# Patient Record
Sex: MALE | Race: WHITE | Employment: OTHER | URBAN - METROPOLITAN AREA
[De-identification: names, ages, dates, MRNs, and addresses within clinical notes are randomized per-mention and may not be internally consistent; named-entity substitution may affect disease eponyms.]

---

## 2017-07-21 ENCOUNTER — HOSPITAL ENCOUNTER (EMERGENCY)
Facility: CLINIC | Age: 82
Discharge: HOME | End: 2017-07-21
Attending: SPECIALIST
Payer: MEDICARE

## 2017-07-21 VITALS
SYSTOLIC BLOOD PRESSURE: 136 MMHG | RESPIRATION RATE: 14 BRPM | BODY MASS INDEX: 24.62 KG/M2 | TEMPERATURE: 97.6 F | OXYGEN SATURATION: 95 % | WEIGHT: 172 LBS | DIASTOLIC BLOOD PRESSURE: 72 MMHG | HEART RATE: 90 BPM | HEIGHT: 70 IN

## 2017-07-21 DIAGNOSIS — M54.50 ACUTE EXACERBATION OF CHRONIC LOW BACK PAIN: Primary | ICD-10-CM

## 2017-07-21 DIAGNOSIS — G89.29 ACUTE EXACERBATION OF CHRONIC LOW BACK PAIN: Primary | ICD-10-CM

## 2017-07-21 PROCEDURE — 99283 EMERGENCY DEPT VISIT LOW MDM: CPT

## 2017-07-21 RX ORDER — DIAZEPAM 5 MG/1
5 TABLET ORAL NIGHTLY PRN
Qty: 10 TABLET | Refills: 0 | Status: SHIPPED | OUTPATIENT
Start: 2017-07-21 | End: 2017-07-31

## 2017-07-21 RX ORDER — TRAMADOL HYDROCHLORIDE 50 MG/1
50 TABLET ORAL EVERY 6 HOURS PRN
Qty: 20 TABLET | Refills: 0 | Status: SHIPPED | OUTPATIENT
Start: 2017-07-21 | End: 2017-07-28

## 2017-07-21 ASSESSMENT — PAIN DESCRIPTION - ONSET: ONSET: GRADUAL

## 2017-07-21 ASSESSMENT — PAIN SCALES - GENERAL
PAINLEVEL_OUTOF10: 6
PAINLEVEL_OUTOF10: 6

## 2017-07-21 ASSESSMENT — PAIN - FUNCTIONAL ASSESSMENT: PAIN_FUNCTIONAL_ASSESSMENT: 0-10

## 2017-07-21 ASSESSMENT — PAIN DESCRIPTION - LOCATION
LOCATION: BACK
LOCATION: BACK

## 2017-07-21 ASSESSMENT — ENCOUNTER SYMPTOMS: BACK PAIN: 1

## 2017-07-21 ASSESSMENT — PAIN DESCRIPTION - PROGRESSION: CLINICAL_PROGRESSION: GRADUALLY WORSENING

## 2017-07-21 ASSESSMENT — PAIN DESCRIPTION - ORIENTATION
ORIENTATION: LOWER
ORIENTATION: LOWER

## 2017-07-21 ASSESSMENT — PAIN DESCRIPTION - FREQUENCY: FREQUENCY: CONTINUOUS

## 2017-07-21 ASSESSMENT — PAIN DESCRIPTION - PAIN TYPE: TYPE: CHRONIC PAIN

## 2017-07-24 PROBLEM — R60.9 EDEMA: Status: ACTIVE | Noted: 2017-07-24

## 2018-08-21 PROBLEM — M54.50 LOW BACK PAIN: Status: ACTIVE | Noted: 2018-08-21

## 2018-08-25 ENCOUNTER — HOSPITAL ENCOUNTER (EMERGENCY)
Facility: CLINIC | Age: 83
Discharge: HOME | End: 2018-08-25
Attending: EMERGENCY MEDICINE
Payer: MEDICARE

## 2018-08-25 ENCOUNTER — APPOINTMENT (OUTPATIENT)
Dept: GENERAL RADIOLOGY | Facility: CLINIC | Age: 83
End: 2018-08-25
Payer: MEDICARE

## 2018-08-25 VITALS
TEMPERATURE: 97 F | SYSTOLIC BLOOD PRESSURE: 148 MMHG | OXYGEN SATURATION: 97 % | DIASTOLIC BLOOD PRESSURE: 60 MMHG | HEART RATE: 62 BPM | BODY MASS INDEX: 24.25 KG/M2 | HEIGHT: 68 IN | RESPIRATION RATE: 22 BRPM | WEIGHT: 160 LBS

## 2018-08-25 DIAGNOSIS — J02.9 ACUTE PHARYNGITIS, UNSPECIFIED ETIOLOGY: ICD-10-CM

## 2018-08-25 DIAGNOSIS — J40 BRONCHITIS: Primary | ICD-10-CM

## 2018-08-25 PROCEDURE — 71046 X-RAY EXAM CHEST 2 VIEWS: CPT

## 2018-08-25 PROCEDURE — 99283 EMERGENCY DEPT VISIT LOW MDM: CPT

## 2018-08-25 RX ORDER — AZITHROMYCIN 250 MG/1
250 TABLET, FILM COATED ORAL DAILY
Qty: 4 TABLET | Refills: 0 | Status: SHIPPED | OUTPATIENT
Start: 2018-08-25 | End: 2018-08-28 | Stop reason: ALTCHOICE

## 2018-08-25 ASSESSMENT — PAIN DESCRIPTION - LOCATION: LOCATION: THROAT

## 2018-08-25 ASSESSMENT — PAIN DESCRIPTION - FREQUENCY: FREQUENCY: CONTINUOUS

## 2018-08-25 ASSESSMENT — PAIN SCALES - GENERAL: PAINLEVEL_OUTOF10: 3

## 2018-08-25 NOTE — ED PROVIDER NOTES
[facility] ED  [address] [address]  Phone: [phone]  eMERGENCY dEPARTMENT eNCOUnter      Pt Name: Ella Singh  MRN: [id number]  [place] 4/17/1929  Date of evaluation: 8/25/2018    CHIEF COMPLAINT       Chief Complaint   Patient presents with    Cough     5 days, runny nose, dry cough, denies fevers, states raspy voice        HISTORY OF PRESENT ILLNESS    Ella Singh is a 80 y.o. male who presents To the emergency department with a five-day history of sore throat and rhinorrhea with a cough. Denies chest pain or shortness of breath. He's visiting from [place] has been in town since July. He has history of chronic back pain and [place] [facility] was given steroids and just finished his last dose of prednisone today. He denies any other symptoms. REVIEW OF SYSTEMS       Constitutional: No fevers or chills   HEENT: Positive sore throat rhinorrhea or earache  Eyes: No blurry vision or double vision no drainage   Cardiovascular: No chest pain or tachycardia   Respiratory: No wheezing or shortness of breath positive cough  Gastrointestinal: No nausea, vomiting, diarrhea, constipation, or abdominal pain   : No hematuria or dysuria   Musculoskeletal: No swelling or pain positive chronic low back pain  Skin: No rash   Neurological: No focal neurologic complaints, paresthesias, weakness, or headache     PAST MEDICAL HISTORY    has a past medical history of Chronic back pain. SURGICAL HISTORY      has a past surgical history that includes eye surgery; hernia repair (2006); and shoulder surgery (07/20/2011).     CURRENT MEDICATIONS       Discharge Medication List as of 8/25/2018 11:08 AM      CONTINUE these medications which have NOT CHANGED    Details   metoprolol tartrate (LOPRESSOR) 25 MG tablet TAKE ONE TABLET BY MOUTH ONE TIME DAILY, R-2Historical Med      terazosin (HYTRIN) 10 MG capsule TAKE 1 ORAL CAPSULE ONCE A DAY, R-1Historical Med

## 2020-08-26 PROBLEM — J45.909 ASTHMATIC BRONCHITIS WITHOUT COMPLICATION: Status: ACTIVE | Noted: 2020-08-26

## 2020-08-28 ENCOUNTER — HOSPITAL ENCOUNTER (OUTPATIENT)
Facility: CLINIC | Age: 85
Discharge: HOME | End: 2020-08-28
Payer: MEDICARE

## 2020-08-28 LAB
ABSOLUTE EOS #: 0.08 K/UL (ref 0–0.44)
ABSOLUTE IMMATURE GRANULOCYTE: <0.03 K/UL (ref 0–0.3)
ABSOLUTE LYMPH #: 1.18 K/UL (ref 1.1–3.7)
ABSOLUTE MONO #: 0.37 K/UL (ref 0.1–1.2)
ALT SERPL-CCNC: 13 U/L (ref 5–41)
ANION GAP SERPL CALCULATED.3IONS-SCNC: 13 MMOL/L (ref 9–17)
AST SERPL-CCNC: 17 U/L
BASOPHILS # BLD: 1 % (ref 0–2)
BASOPHILS ABSOLUTE: 0.03 K/UL (ref 0–0.2)
BUN BLDV-MCNC: 18 MG/DL (ref 8–23)
BUN/CREAT BLD: NORMAL (ref 9–20)
CALCIUM SERPL-MCNC: 9.2 MG/DL (ref 8.6–10.4)
CHLORIDE BLD-SCNC: 104 MMOL/L (ref 98–107)
CHOLESTEROL/HDL RATIO: 2.8
CHOLESTEROL: 186 MG/DL
CO2: 22 MMOL/L (ref 20–31)
CREAT SERPL-MCNC: 0.73 MG/DL (ref 0.7–1.2)
DIFFERENTIAL TYPE: ABNORMAL
EOSINOPHILS RELATIVE PERCENT: 2 % (ref 1–4)
ESTIMATED AVERAGE GLUCOSE: 126 MG/DL
GFR AFRICAN AMERICAN: >60 ML/MIN
GFR NON-AFRICAN AMERICAN: >60 ML/MIN
GFR SERPL CREATININE-BSD FRML MDRD: NORMAL ML/MIN/{1.73_M2}
GFR SERPL CREATININE-BSD FRML MDRD: NORMAL ML/MIN/{1.73_M2}
GLUCOSE FASTING: 97 MG/DL (ref 70–99)
HBA1C MFR BLD: 6 % (ref 4–6)
HCT VFR BLD CALC: 42.5 % (ref 40.7–50.3)
HDLC SERPL-MCNC: 67 MG/DL
HEMOGLOBIN: 13.9 G/DL (ref 13–17)
IMMATURE GRANULOCYTES: 0 %
LDL CHOLESTEROL: 107 MG/DL (ref 0–130)
LYMPHOCYTES # BLD: 24 % (ref 24–43)
MCH RBC QN AUTO: 32.1 PG (ref 25.2–33.5)
MCHC RBC AUTO-ENTMCNC: 32.7 G/DL (ref 28.4–34.8)
MCV RBC AUTO: 98.2 FL (ref 82.6–102.9)
MONOCYTES # BLD: 7 % (ref 3–12)
NRBC AUTOMATED: 0 PER 100 WBC
PDW BLD-RTO: 12.6 % (ref 11.8–14.4)
PLATELET # BLD: 193 K/UL (ref 138–453)
PLATELET ESTIMATE: ABNORMAL
PMV BLD AUTO: 12.2 FL (ref 8.1–13.5)
POTASSIUM SERPL-SCNC: 4.5 MMOL/L (ref 3.7–5.3)
RBC # BLD: 4.33 M/UL (ref 4.21–5.77)
RBC # BLD: ABNORMAL 10*6/UL
SEG NEUTROPHILS: 66 % (ref 36–65)
SEGMENTED NEUTROPHILS ABSOLUTE COUNT: 3.36 K/UL (ref 1.5–8.1)
SODIUM BLD-SCNC: 139 MMOL/L (ref 135–144)
TRIGL SERPL-MCNC: 59 MG/DL
TSH SERPL DL<=0.05 MIU/L-ACNC: 1.49 MIU/L (ref 0.3–5)
VITAMIN B-12: 834 PG/ML (ref 232–1245)
VITAMIN D 25-HYDROXY: 73.8 NG/ML (ref 30–100)
VLDLC SERPL CALC-MCNC: NORMAL MG/DL (ref 1–30)
WBC # BLD: 5 K/UL (ref 3.5–11.3)
WBC # BLD: ABNORMAL 10*3/UL

## 2020-08-28 PROCEDURE — 82306 VITAMIN D 25 HYDROXY: CPT

## 2020-08-28 PROCEDURE — 83036 HEMOGLOBIN GLYCOSYLATED A1C: CPT

## 2020-08-28 PROCEDURE — 36415 COLL VENOUS BLD VENIPUNCTURE: CPT

## 2020-08-28 PROCEDURE — 80048 BASIC METABOLIC PNL TOTAL CA: CPT

## 2020-08-28 PROCEDURE — 84450 TRANSFERASE (AST) (SGOT): CPT

## 2020-08-28 PROCEDURE — 85025 COMPLETE CBC W/AUTO DIFF WBC: CPT

## 2020-08-28 PROCEDURE — 84460 ALANINE AMINO (ALT) (SGPT): CPT

## 2020-08-28 PROCEDURE — 82607 VITAMIN B-12: CPT

## 2020-08-28 PROCEDURE — 84443 ASSAY THYROID STIM HORMONE: CPT

## 2020-08-28 PROCEDURE — 80061 LIPID PANEL: CPT

## 2020-09-18 ENCOUNTER — OFFICE VISIT (OUTPATIENT)
Dept: PAIN MANAGEMENT | Age: 85
End: 2020-09-18
Payer: MEDICARE

## 2020-09-18 VITALS
HEART RATE: 60 BPM | HEIGHT: 70 IN | SYSTOLIC BLOOD PRESSURE: 120 MMHG | TEMPERATURE: 97.2 F | WEIGHT: 165 LBS | BODY MASS INDEX: 23.62 KG/M2 | OXYGEN SATURATION: 90 % | DIASTOLIC BLOOD PRESSURE: 65 MMHG

## 2020-09-18 PROCEDURE — 1036F TOBACCO NON-USER: CPT | Performed by: ANESTHESIOLOGY

## 2020-09-18 PROCEDURE — 99204 OFFICE O/P NEW MOD 45 MIN: CPT | Performed by: ANESTHESIOLOGY

## 2020-09-18 PROCEDURE — G8427 DOCREV CUR MEDS BY ELIG CLIN: HCPCS | Performed by: ANESTHESIOLOGY

## 2020-09-18 PROCEDURE — 1123F ACP DISCUSS/DSCN MKR DOCD: CPT | Performed by: ANESTHESIOLOGY

## 2020-09-18 PROCEDURE — G8420 CALC BMI NORM PARAMETERS: HCPCS | Performed by: ANESTHESIOLOGY

## 2020-09-18 PROCEDURE — 4040F PNEUMOC VAC/ADMIN/RCVD: CPT | Performed by: ANESTHESIOLOGY

## 2020-09-18 RX ORDER — HYDROCODONE BITARTRATE AND ACETAMINOPHEN 5; 325 MG/1; MG/1
1 TABLET ORAL 2 TIMES DAILY
Qty: 60 TABLET | Refills: 0 | Status: SHIPPED | OUTPATIENT
Start: 2020-09-18 | End: 2020-11-17 | Stop reason: SDUPTHER

## 2020-09-18 RX ORDER — HYDROCODONE BITARTRATE AND ACETAMINOPHEN 5; 325 MG/1; MG/1
1 TABLET ORAL EVERY 6 HOURS PRN
COMMUNITY
End: 2021-01-05 | Stop reason: SDUPTHER

## 2020-09-18 ASSESSMENT — ENCOUNTER SYMPTOMS
ALLERGIC/IMMUNOLOGIC NEGATIVE: 1
EYES NEGATIVE: 1
RESPIRATORY NEGATIVE: 1
BACK PAIN: 1
GASTROINTESTINAL NEGATIVE: 1

## 2020-09-18 NOTE — PROGRESS NOTES
tingling / weakness):  yes  -Where at: legs   -Down into finger tips or toes (specify which finger or toes): no   -constant or intermitting: constant   11. Red Flags: (weight loss / chills / loss of bladder or bowel control): no     Previous management history  1. Previous diagnostic workup: (Imaging/EMG)   CT, MRI, or Xray: yes   What part of the body:back   What facility did they have it at: in [place]   What year or specific date: 2018   EMG:  Yes 2018    2. Previous non interventional treatments tried:  chiropractor or physical therapy: PT  What part of the body: Back   What facility was it done at: in [place]   How long ago was it last tried:year  Did it work: No  Did they complete it:Yes    3. Previous Medications tried  NSAID's: yes  Neurontin: yes  Lyrica: no  Trycyclic antidepressant (Ellavil / Pamelor ): no  Cymbalta: no  Opioids (Ultram / Vicodin / Percocet / Morphine / Dilaudid / Oramorph/ Fentanyl etc.): norco, tramadol, morphine   Last Pain medication taken (name of med and date): norco 9/17/2020    4. Previous Interventional pain procedures tried:  What kind of injection: epidural  Who did the injection: Dr. Juno Lopez in [place]   did the injection help: no  Last time injection was done:2-3 months    5.  Previous surgeries for pain  What part of the body did they have the surgery: mild procedure   What physician did the surgery: Dr. Juno Lopez in [facility] did they have the surgery done: [place]   Date of Surgery:2018    Social History:  Marital status:    Employment History:retired   Working  No  Full time Or Part time: n/a  Disability  No   Legal Issues related to pain complaint: No     Pain Disability Index score : 64    Lab Results   Component Value Date    LABA1C 6.0 08/28/2020     Lab Results   Component Value Date     08/28/2020         Informant: patient        Past Medical History:   Diagnosis Date    Chronic back pain       Past Surgical History: Procedure Laterality Date    EYE SURGERY      HERNIA REPAIR  2006    SHOULDER SURGERY  07/20/2011     Social History     Socioeconomic History    Marital status:      Spouse name: None    Number of children: None    Years of education: None    Highest education level: None   Occupational History    None   Social Needs    Financial resource strain: None    Food insecurity     Worry: None     Inability: None    Transportation needs     Medical: None     Non-medical: None   Tobacco Use    Smoking status: Former Smoker    Smokeless tobacco: Never Used   Substance and Sexual Activity    Alcohol use: Yes     Alcohol/week: 1.0 standard drinks     Types: 1 Glasses of wine per week    Drug use: No    Sexual activity: None   Lifestyle    Physical activity     Days per week: None     Minutes per session: None    Stress: None   Relationships    Social connections     Talks on phone: None     Gets together: None     Attends Orthodox service: None     Active member of club or organization: None     Attends meetings of clubs or organizations: None     Relationship status: None    Intimate partner violence     Fear of current or ex partner: None     Emotionally abused: None     Physically abused: None     Forced sexual activity: None   Other Topics Concern    None   Social History Narrative    None     Family History   Problem Relation Age of Onset    Heart Disease Mother     Diabetes Mother     Stroke Father      No Known Allergies  Patient has no known allergies. Vitals:    09/18/20 0903   BP: 120/65   Pulse: 60   Temp: 97.2 °F (36.2 °C)   SpO2: 90%     Current Outpatient Medications   Medication Sig Dispense Refill    HYDROcodone-acetaminophen (NORCO) 5-325 MG per tablet Take 1 tablet by mouth every 6 hours as needed for Pain.  HYDROcodone-acetaminophen (NORCO) 5-325 MG per tablet Take 1 tablet by mouth 2 times daily for 30 days.  60 tablet 0    gabapentin (NEURONTIN) 600 MG tablet Take 600 mg by mouth 3 times daily.  metoprolol tartrate (LOPRESSOR) 25 MG tablet TAKE ONE TABLET BY MOUTH ONE TIME DAILY  2    terazosin (HYTRIN) 10 MG capsule TAKE 1 ORAL CAPSULE ONCE A DAY  1     No current facility-administered medications for this visit. Review of Systems   Constitutional: Negative. Negative for fever. HENT: Negative. Eyes: Negative. Respiratory: Negative. Cardiovascular: Negative. Gastrointestinal: Negative. Endocrine: Negative. Genitourinary: Negative. Musculoskeletal: Positive for back pain and neck pain. Skin: Negative. Allergic/Immunologic: Negative. Neurological: Positive for weakness. Hematological: Bruises/bleeds easily. Psychiatric/Behavioral: Negative. All other systems reviewed and are negative. Objective:  General Appearance:  Well-appearing, in no acute distress, uncomfortable and in pain. Vital signs: (most recent): Blood pressure 120/65, pulse 60, temperature 97.2 °F (36.2 °C), height 5' 9.5\" (1.765 m), weight 165 lb (74.8 kg), SpO2 90 %. Vital signs are normal.  No fever. Output: Producing urine and producing stool. HEENT: Normal HEENT exam.    Lungs:  Normal effort and normal respiratory rate. Breath sounds clear to auscultation. He is not in respiratory distress. No decreased breath sounds. Heart: Normal rate. Regular rhythm. No murmur. Chest: Symmetric chest wall expansion. No chest wall tenderness. Extremities: Normal range of motion. There is no deformity. Neurological: Patient is alert and oriented to person, place and time. Normal strength. Patient has normal reflexes, normal muscle tone and normal coordination. Pupils:  Pupils are equal, round, and reactive to light. Pupils are equal.   Skin:  Warm and dry. No rash or cyanosis.       Patient ambulate with walker  Spine range of motion is limited  Positive tenderness palpation over bilateral lumbar paraspinal muscle  Gait antalgic  Standing in a forward stooped posture      Assessment & Plan   Pain History\  This is a pleasant 55-year-old gentleman with no significant past medical history  He recently moved from [place] to [place] with his family  Patient is seen for history of chronic lower back pain  Onset of symptom many years ago  Symptoms are progressively worsened  He describes his back pain is a constant aching nagging tenderness and stiffness  Pain also radiates down in both legs with standing and walking and alleviates with rest  Leg pain is only with walking  Patient has been diagnosed with lumbar spondylosis and lumbar spinal stenosis  Patient of tried different modalities including physical therapy, different pain injections, pain medications, spinal cord stimulator trial and mild procedure  Patient continued to have pain after these intervention  None of these interventions have made any significant improvement  His last diagnostic imaging was more than 2 years ago  His most recent procedure was an epidural injection with 1 day relief  Mild procedure was done last year in 2019  Spinal cord stimulator trial was in 2018    Pain is interfering with quality of life  He denies any significant changes in bladder or bowel control  Patient for last 3 to 4 years have been on Norco PRN basis 1 to 2 tablets a day  Denies any side effect from the medication and finds medication helpful allowing him to do daily life activity    Pain score today  8  1. Chronic bilateral low back pain with bilateral sciatica    2. Spinal stenosis of lumbar region with neurogenic claudication    3.  Lumbosacral spondylosis without myelopathy          Safe use of medication discussed  Automated prescription report reviewed  We will collect urine for toxicology  We will sign opioid contract  Expectation with regard to safe use of opioid discussed with patient    Clinical presentation suggest to major etiology  Chronic axial back pain likely related to lumbar facet arthropathy and neurogenic claudication likely resulting from neurogenic spinal stenosis    I will obtain an MRI lumbar spine and will consider for appropriate interventional procedure or surgical referral if indicated after review of the imaging  Plan discussed with patient and accompanying daughter    Orders Placed This Encounter   Procedures    MRI LUMBAR SPINE WO CONTRAST      Orders Placed This Encounter   Medications    HYDROcodone-acetaminophen (NORCO) 5-325 MG per tablet     Sig: Take 1 tablet by mouth 2 times daily for 30 days. Dispense:  60 tablet     Refill:  0     Reduce doses taken as pain becomes manageable      Controlled Substance Monitoring:    Acute and Chronic Pain Monitoring:   RX Monitoring 9/18/2020   Periodic Controlled Substance Monitoring No signs of potential drug abuse or diversion identified. ;Possible medication side effects, risk of tolerance/dependence & alternative treatments discussed. ;Assessed functional status. ;Random urine drug screen sent today. Chronic Pain > 50 MEDD Obtained or confirmed \"Consent for Opioid Use\" on file.            Electronically signed by Benita Resendiz MD on 9/18/2020 at 9:42 AM

## 2020-09-24 ENCOUNTER — HOSPITAL ENCOUNTER (OUTPATIENT)
Dept: MRI IMAGING | Facility: CLINIC | Age: 85
Discharge: HOME | End: 2020-09-26
Payer: MEDICARE

## 2020-09-24 PROCEDURE — 72148 MRI LUMBAR SPINE W/O DYE: CPT

## 2020-09-29 ENCOUNTER — TELEPHONE (OUTPATIENT)
Dept: PAIN MANAGEMENT | Age: 85
End: 2020-09-29

## 2020-09-29 NOTE — TELEPHONE ENCOUNTER
UDS positive for alcohol. Pt notified that Dr. mAy Dc will be unable to continue to prescribed pain medications if he continues consuming alcohol. Pt instructed to keep follow up appointment as scheduled.

## 2020-10-12 NOTE — PROGRESS NOTES
The patient is a 80 y. o. Non-/non  male. Chief Complaint   Patient presents with    Follow-up          HPI     Patient was seen 4 weeks ago for history of severe chronic lower back pain  Pain is constant  Reports severe stiffness in back when he wakes up in the morning  Also have pain going down both legs are aggravated with standing and walking    He has recently moved from [place] and was followed with the pain clinic  Had numerous different interventions here including facet injection epidural injections spinal cord stimulator trial and mild procedure    Have a recent MRI lumbar spine and is here for review of the imaging and discuss treatment options          Past Medical History:   Diagnosis Date    Chronic back pain       Past Surgical History:   Procedure Laterality Date    EYE SURGERY      HERNIA REPAIR  2006    SHOULDER SURGERY  07/20/2011     Social History     Socioeconomic History    Marital status:      Spouse name: None    Number of children: None    Years of education: None    Highest education level: None   Occupational History    None   Social Needs    Financial resource strain: None    Food insecurity     Worry: None     Inability: None    Transportation needs     Medical: None     Non-medical: None   Tobacco Use    Smoking status: Former Smoker    Smokeless tobacco: Never Used   Substance and Sexual Activity    Alcohol use:  Yes     Alcohol/week: 1.0 standard drinks     Types: 1 Glasses of wine per week    Drug use: No    Sexual activity: None   Lifestyle    Physical activity     Days per week: None     Minutes per session: None    Stress: None   Relationships    Social connections     Talks on phone: None     Gets together: None     Attends [employer]ist service: None     Active member of club or organization: None     Attends meetings of clubs or organizations: None     Relationship status: None    Intimate partner violence     Fear of current or ex partner: None     Emotionally abused: None     Physically abused: None     Forced sexual activity: None   Other Topics Concern    None   Social History Narrative    None     Family History   Problem Relation Age of Onset    Heart Disease Mother     Diabetes Mother     Stroke Father      No Known Allergies  Patient has no known allergies. Vitals:    10/13/20 0927   BP: 101/60   Pulse: 64     Current Outpatient Medications   Medication Sig Dispense Refill    HYDROcodone-acetaminophen (NORCO) 5-325 MG per tablet Take 1 tablet by mouth every 6 hours as needed for Pain.  HYDROcodone-acetaminophen (NORCO) 5-325 MG per tablet Take 1 tablet by mouth 2 times daily for 30 days. 60 tablet 0    gabapentin (NEURONTIN) 600 MG tablet Take 600 mg by mouth 3 times daily.  metoprolol tartrate (LOPRESSOR) 25 MG tablet TAKE ONE TABLET BY MOUTH ONE TIME DAILY  2    cephALEXin (KEFLEX) 500 MG capsule Take 1 capsule by mouth 4 times daily (Patient not taking: Reported on 10/13/2020) 28 capsule 0    terazosin (HYTRIN) 10 MG capsule TAKE 1 ORAL CAPSULE ONCE A DAY  1     No current facility-administered medications for this visit. Review of Systems   Constitutional: Negative. Negative for fever. Musculoskeletal: Positive for back pain. Hematological: Negative. Psychiatric/Behavioral: Negative. Objective:  General Appearance:  Well-appearing, in no acute distress, uncomfortable and in pain. Vital signs: (most recent): Blood pressure 101/60, pulse 64, height 5' 9\" (1.753 m), weight 165 lb (74.8 kg). Vital signs are normal.  No fever. Output: Producing urine and producing stool. HEENT: Normal HEENT exam.    Lungs:  Normal effort and normal respiratory rate. Breath sounds clear to auscultation. He is not in respiratory distress. Heart: Normal rate. Extremities: Normal range of motion. There is no deformity. Neurological: Patient is alert and oriented to person, place and time. Patient has normal coordination. Pupils:  Pupils are equal, round, and reactive to light. Pupils are equal.   Skin:  Warm and dry. No rash or cyanosis. Assessment & Plan       EXAMINATION: MRI OF THE LUMBAR SPINE WITHOUT CONTRAST, 9/24/2020 8:12 am    Impression 1. Severe spinal canal stenosis at L2-L3 and L3-L4 with moderate to severe stenosis at L4-L5. 2. Multilevel neural foraminal narrowing is seen as above. 3. Spondylolisthesis at L3-L4 through L5-S1. 4. Modic type 1 degenerative endplate changes at O2-R9 and L3-L4. Impression 1. Severe spinal canal stenosis at L2-L3 and L3-L4 with moderate to severe stenosis at L4-L5. 2. Multilevel neural foraminal narrowing is seen as above. 3. Spondylolisthesis at L3-L4 through L5-S1. 4. Modic type 1 degenerative endplate changes at R3-J4 and L3-L4. 1. Abnormal MRI, lumbar spine    2. Spinal stenosis of lumbar region with neurogenic claudication    3.  Lumbosacral spondylosis without myelopathy          MRI lumbar spine  Report was reviewed  Images were reviewed independently  Images were shown to the patient and accompanying daughter  Findings discussed in detail    It showed severe lumbar spinal stenosis at L2-L3 and L3-L4  Also showed Modic changes severe in L2-L3 and L4  Spondylolisthesis in the lower lumbar spine seems stable  Will need flexion extension film    Axial imaging shows lumbar facet arthropathy and facet joint effusions    It is clear based on imaging and his clinical presentation that there are multiple etiologies for his back pain  I explained to the family that minimally invasive lumbar spine interspace or for the treatment of his spinal stenosis is the least available corrective intervention for him at this point  He will also require facet joint intervention to manage facet mediated pain and will probably will require basivertebral nerve ablation for Modic changes in lumbar spine    Patient wants to try lumbar facet injection and lumbar epidural injection  This seems reasonable  We will schedule for injections and will follow-up after that  Orders Placed This Encounter   Procedures    PA INJECT ANES/STEROID FORAMEN LUMBAR/SACRAL W IMG GUIDE ,1 LEVEL    PA INJ DX/THER AGNT PARAVERT FACET JOINT, LUMBAR/SAC, 1ST LEVEL      No orders of the defined types were placed in this encounter.          Electronically signed by Aishwarya Courtney MD on 10/13/2020 at 10:19 AM

## 2020-10-13 ENCOUNTER — OFFICE VISIT (OUTPATIENT)
Dept: PAIN MANAGEMENT | Age: 85
End: 2020-10-13
Payer: MEDICARE

## 2020-10-13 VITALS
HEART RATE: 64 BPM | WEIGHT: 165 LBS | DIASTOLIC BLOOD PRESSURE: 60 MMHG | SYSTOLIC BLOOD PRESSURE: 101 MMHG | BODY MASS INDEX: 24.44 KG/M2 | HEIGHT: 69 IN

## 2020-10-13 PROBLEM — M47.817 LUMBOSACRAL SPONDYLOSIS WITHOUT MYELOPATHY: Status: ACTIVE | Noted: 2020-10-13

## 2020-10-13 PROBLEM — R93.7 ABNORMAL MRI, LUMBAR SPINE: Status: ACTIVE | Noted: 2020-10-13

## 2020-10-13 PROBLEM — M48.062 SPINAL STENOSIS OF LUMBAR REGION WITH NEUROGENIC CLAUDICATION: Status: ACTIVE | Noted: 2020-10-13

## 2020-10-13 PROCEDURE — G8427 DOCREV CUR MEDS BY ELIG CLIN: HCPCS | Performed by: ANESTHESIOLOGY

## 2020-10-13 PROCEDURE — 1036F TOBACCO NON-USER: CPT | Performed by: ANESTHESIOLOGY

## 2020-10-13 PROCEDURE — 1123F ACP DISCUSS/DSCN MKR DOCD: CPT | Performed by: ANESTHESIOLOGY

## 2020-10-13 PROCEDURE — 4040F PNEUMOC VAC/ADMIN/RCVD: CPT | Performed by: ANESTHESIOLOGY

## 2020-10-13 PROCEDURE — G8420 CALC BMI NORM PARAMETERS: HCPCS | Performed by: ANESTHESIOLOGY

## 2020-10-13 PROCEDURE — G8484 FLU IMMUNIZE NO ADMIN: HCPCS | Performed by: ANESTHESIOLOGY

## 2020-10-13 PROCEDURE — 99214 OFFICE O/P EST MOD 30 MIN: CPT | Performed by: ANESTHESIOLOGY

## 2020-10-13 ASSESSMENT — ENCOUNTER SYMPTOMS: BACK PAIN: 1

## 2020-10-23 ENCOUNTER — TELEPHONE (OUTPATIENT)
Dept: PAIN MANAGEMENT | Age: 85
End: 2020-10-23

## 2020-10-23 NOTE — TELEPHONE ENCOUNTER
Pt informed procedure on 11/9/20 has been cancelled due to physician not available that day.  Pt agreed to reschedule procedure from 11/9/20 to 11/12/20 with arrival time to [address] by 12:20 pm

## 2020-10-24 ENCOUNTER — HOSPITAL ENCOUNTER (OUTPATIENT)
Age: 85
Discharge: HOME | End: 2020-10-24
Payer: MEDICARE

## 2020-10-25 ENCOUNTER — HOSPITAL ENCOUNTER (OUTPATIENT)
Age: 85
Setting detail: SPECIMEN
Discharge: HOME | End: 2020-10-29
Payer: MEDICARE

## 2020-10-26 ENCOUNTER — TELEPHONE (OUTPATIENT)
Dept: PAIN MANAGEMENT | Age: 85
End: 2020-10-26

## 2020-10-26 NOTE — TELEPHONE ENCOUNTER
Pt states he was unable to find last minute  transportation to take him to [facility] for procedure today.  Pt rescheduled procedure to 11/23/20 to follow procedure scheduled on 11/12/20

## 2020-11-12 ENCOUNTER — APPOINTMENT (OUTPATIENT)
Dept: GENERAL RADIOLOGY | Age: 85
End: 2020-11-12
Attending: ANESTHESIOLOGY
Payer: MEDICARE

## 2020-11-12 ENCOUNTER — HOSPITAL ENCOUNTER (OUTPATIENT)
Age: 85
Setting detail: OUTPATIENT SURGERY
Discharge: HOME | End: 2020-11-12
Attending: ANESTHESIOLOGY | Admitting: ANESTHESIOLOGY
Payer: MEDICARE

## 2020-11-12 VITALS
OXYGEN SATURATION: 97 % | RESPIRATION RATE: 16 BRPM | SYSTOLIC BLOOD PRESSURE: 122 MMHG | TEMPERATURE: 98.1 F | HEART RATE: 56 BPM | DIASTOLIC BLOOD PRESSURE: 48 MMHG

## 2020-11-12 PROBLEM — M47.817 LUMBOSACRAL SPONDYLOSIS WITHOUT MYELOPATHY: Chronic | Status: ACTIVE | Noted: 2020-10-13

## 2020-11-12 PROCEDURE — 3600000050 HC PAIN LEVEL 1 BASE: Performed by: ANESTHESIOLOGY

## 2020-11-12 PROCEDURE — 7100000011 HC PHASE II RECOVERY - ADDTL 15 MIN: Performed by: ANESTHESIOLOGY

## 2020-11-12 PROCEDURE — 99152 MOD SED SAME PHYS/QHP 5/>YRS: CPT | Performed by: ANESTHESIOLOGY

## 2020-11-12 PROCEDURE — 2709999900 HC NON-CHARGEABLE SUPPLY: Performed by: ANESTHESIOLOGY

## 2020-11-12 PROCEDURE — 2580000003 HC RX 258: Performed by: ANESTHESIOLOGY

## 2020-11-12 PROCEDURE — 6360000004 HC RX CONTRAST MEDICATION: Performed by: ANESTHESIOLOGY

## 2020-11-12 PROCEDURE — 64493 INJ PARAVERT F JNT L/S 1 LEV: CPT | Performed by: ANESTHESIOLOGY

## 2020-11-12 PROCEDURE — 3209999900 FLUORO FOR SURGICAL PROCEDURES

## 2020-11-12 PROCEDURE — 64495 INJ PARAVERT F JNT L/S 3 LEV: CPT | Performed by: ANESTHESIOLOGY

## 2020-11-12 PROCEDURE — 3600000051 HC PAIN LEVEL 1 ADDL 15 MIN: Performed by: ANESTHESIOLOGY

## 2020-11-12 PROCEDURE — 64494 INJ PARAVERT F JNT L/S 2 LEV: CPT | Performed by: ANESTHESIOLOGY

## 2020-11-12 PROCEDURE — 6360000002 HC RX W HCPCS: Performed by: ANESTHESIOLOGY

## 2020-11-12 PROCEDURE — 7100000010 HC PHASE II RECOVERY - FIRST 15 MIN: Performed by: ANESTHESIOLOGY

## 2020-11-12 PROCEDURE — 2500000003 HC RX 250 WO HCPCS: Performed by: ANESTHESIOLOGY

## 2020-11-12 RX ORDER — ASPIRIN 81 MG/1
81 TABLET ORAL DAILY
COMMUNITY
End: 2021-08-10

## 2020-11-12 RX ORDER — FENTANYL CITRATE 50 UG/ML
INJECTION, SOLUTION INTRAMUSCULAR; INTRAVENOUS PRN
Status: DISCONTINUED | OUTPATIENT
Start: 2020-11-12 | End: 2020-11-12 | Stop reason: ALTCHOICE

## 2020-11-12 RX ORDER — SODIUM CHLORIDE 0.9 % (FLUSH) 0.9 %
10 SYRINGE (ML) INJECTION EVERY 12 HOURS SCHEDULED
Status: DISCONTINUED | OUTPATIENT
Start: 2020-11-12 | End: 2020-11-12 | Stop reason: HOSPADM

## 2020-11-12 RX ORDER — BETAMETHASONE SODIUM PHOSPHATE AND BETAMETHASONE ACETATE 3; 3 MG/ML; MG/ML
INJECTION, SUSPENSION INTRA-ARTICULAR; INTRALESIONAL; INTRAMUSCULAR; SOFT TISSUE PRN
Status: DISCONTINUED | OUTPATIENT
Start: 2020-11-12 | End: 2020-11-12 | Stop reason: ALTCHOICE

## 2020-11-12 RX ORDER — MIDAZOLAM HYDROCHLORIDE 1 MG/ML
INJECTION INTRAMUSCULAR; INTRAVENOUS PRN
Status: DISCONTINUED | OUTPATIENT
Start: 2020-11-12 | End: 2020-11-12 | Stop reason: ALTCHOICE

## 2020-11-12 RX ORDER — LIDOCAINE HYDROCHLORIDE 10 MG/ML
INJECTION, SOLUTION INFILTRATION; PERINEURAL PRN
Status: DISCONTINUED | OUTPATIENT
Start: 2020-11-12 | End: 2020-11-12 | Stop reason: ALTCHOICE

## 2020-11-12 RX ORDER — SODIUM CHLORIDE 0.9 % (FLUSH) 0.9 %
10 SYRINGE (ML) INJECTION PRN
Status: DISCONTINUED | OUTPATIENT
Start: 2020-11-12 | End: 2020-11-12 | Stop reason: HOSPADM

## 2020-11-12 RX ADMIN — SODIUM CHLORIDE, PRESERVATIVE FREE 10 ML: 5 INJECTION INTRAVENOUS at 12:55

## 2020-11-12 ASSESSMENT — PAIN DESCRIPTION - DESCRIPTORS: DESCRIPTORS: ACHING;DULL

## 2020-11-12 ASSESSMENT — PAIN SCALES - GENERAL: PAINLEVEL_OUTOF10: 3

## 2020-11-12 ASSESSMENT — PAIN - FUNCTIONAL ASSESSMENT: PAIN_FUNCTIONAL_ASSESSMENT: 0-10

## 2020-11-12 NOTE — OP NOTE
Operative Note      Patient: Sharri Osborn  YOB: 1929  MRN: [id number]    Date of Procedure: 11/12/2020    Pre-Op Diagnosis: DX  SPINAL STENOSIS LUMBAR W/ NEUROGENIC CLAUDICATION    Post-Op Diagnosis: Same       Procedure(s):  BILATERAL LUMBAR FACET STEROID INJECTION    L3-S1    Surgeon(s):  Margorie Crigler, MD    Assistant:   * No surgical staff found *    Anesthesia: IV Sedation    Estimated Blood Loss (mL): Minimal    Complications: None    Specimens:   * No specimens in log *    Implants:  * No implants in log *      Drains: * No LDAs found *    Findings: n/a    Detailed Description of Procedure:       Preoperative Diagnosis:    Chronic lower back pain without sciatica  Lumbar facet syndrome      Postoperative Diagnosis:   Chronic lower back pain without sciatica  Lumbar facet syndrome    Blood loss: none    Procedure Performed[de-identified]    Lumbar facet joint injections at the levels of L2-L3, L3-L4, L4-L5, on the Bilateral side with fluoroscopy guidance, with IV sedation. Procedure:     Using fluoroscopy the facet joints were identified and by adjusting the angle of the fluoroscopy, the view of the joint space was optimized. The skin and deep tissues over the joint space were anesthetized with 1 ml of 1 % lidocaine    The #22-gauge, spinal needle was introduced through the skin wheal under fluoroscopoc guidance such that the tip of the needle lies in the joint space. This was confirmed by injecting about 0.2 ml of Omnipaque-180 through the needle and observing the spread of the contrast along the joint space. Then after negative aspiration 0.7 ml from a treatment mixture made with 4 ml of 0.5% Marcaine with 1 mL of dexamethasone 10 mg/mL was injected into the joint space. Procedure was first performed on right side and was then repeated on the left side at the same level with same technique    A total of 10 mg of dexamethasone was used and was divided in equal amounts among the joints.   After removing the needles Band-Aids were placed over the needle insertion sites. Patient's vital signs remained stable and tolerated the procedure well. The patient was discharged home in stable condition and will be followed in the pain clinic in the next few weeks for further planning. Electronically signed by Shawnie Canavan, MD on 11/12/2020 at 1:30 PM    SEDATION NOTE:    ASA CLASSIFICATION  3  MP   CLASSIFICATION  3    · Moderate intravenous conscious sedation was supervised by Dr. Jose Solis  . The patient was independently monitored by a Registered Nurse assigned to the Procedure Room  . Monitoring included automated blood pressure, continuous EKG, Capnography and continuous pulse oximetry. . The detailed Conscious Record is permanently stored in the [employer] .      The following is the conscious sedation record;  Start Time:  1316  End times:  1331  Duration:  15 MINUTES  MEDS GIVEN 1 MG VERSED AND 50 MCG FENTANYL

## 2020-11-12 NOTE — H&P
History and Physical Update    Pt Name: Lena Mcclure  MRN: [id number]  YOB: 1929  Date of evaluation: 11/12/2020      [x] I have reviewed the Pain Management Progress Note by Dr Jerel Cole dated 10/13/20 in epic which meets the criteria for an Interval History and Physical note and is attached below. [x] I have examined  Lena Mcclure  There are no changes to the patient who is scheduled for a bilateral lmbar facet steroid injection L3-S1 by Dr Darling Jesus for lumbar spinal stenosis with neurogenic claudication. The patient denies new health changes, fever, chills, wheezing, cough, increased SOB, chest pain, open sores or wounds. Last ASA 81mg 11/6/20     Vital signs: BP (!) 150/57   Pulse 61   Temp 97.5 °F (36.4 °C) (Temporal)   Resp 16   SpO2 97%     Allergies:  Patient has no known allergies. Medications:    Prior to Admission medications    Medication Sig Start Date End Date Taking? Authorizing Provider   aspirin 81 MG EC tablet Take 81 mg by mouth daily   Yes Historical Provider, MD   HYDROcodone-acetaminophen (NORCO) 5-325 MG per tablet Take 1 tablet by mouth every 6 hours as needed for Pain. Yes Historical Provider, MD   gabapentin (NEURONTIN) 600 MG tablet Take 600 mg by mouth 3 times daily. Yes Historical Provider, MD   metoprolol tartrate (LOPRESSOR) 25 MG tablet TAKE ONE TABLET BY MOUTH ONE TIME DAILY 6/17/17  Yes Historical Provider, MD   terazosin (HYTRIN) 10 MG capsule TAKE 1 ORAL CAPSULE ONCE A DAY 6/15/17  Yes Historical Provider, MD   cephALEXin (KEFLEX) 500 MG capsule Take 1 capsule by mouth 4 times daily 10/8/20   Ze Francisco MD         This is a 80 y.o. male who is pleasant, cooperative, alert and oriented x3, in no acute distress. Heart: Heart sounds are normal.  HR 61regular rate and rhythm without murmur, gallop or rub.    Lungs: Normal respiratory effort with equal expansion, good air exchange, unlabored without wheezes or rales bilaterally   Abdomen: soft, nontender, nondistended with bowel sounds . Labs:  No results for input(s): HGB, HCT, WBC, MCV, PLT, NA, K, CL, CO2, BUN, CREATININE, GLUCOSE, INR, PROTIME, APTT, AST, ALT, LABALBU, HCG in the last 720 hours. No results for input(s): COVID19 in the last 720 hours. RADHA Ramsay-BC  Electronically signed 11/12/2020 at 1:09 Cat Hillman MD    Physician    Specialty:  Pain Management    Progress Notes       Signed    Encounter Date:  10/13/2020          Related encounter: Office Visit from 10/13/2020 in [id number] [facility] [place]          Signed        Expand All Collapse All      The patient is a 80 y. o. Non-/non  male.      Chief Complaint   Patient presents with    Follow-up            HPI      Patient was seen 4 weeks ago for history of severe chronic lower back pain  Pain is constant  Reports severe stiffness in back when he wakes up in the morning  Also have pain going down both legs are aggravated with standing and walking     He has recently moved from [place] and was followed with the pain clinic  Had numerous different interventions here including facet injection epidural injections spinal cord stimulator trial and mild procedure     Have a recent MRI lumbar spine and is here for review of the imaging and discuss treatment options              Past Medical History        Past Medical History:   Diagnosis Date    Chronic back pain           Past Surgical History         Past Surgical History:   Procedure Laterality Date   Dolly 18 2006    SHOULDER SURGERY   07/20/2011        Social History   Social History            Socioeconomic History    Marital status:        Spouse name: None    Number of children: None    Years of education: None    Highest education level: None   Occupational History    None   Social Needs    Financial resource strain: None    Food insecurity       Worry: None       Inability: None    Transportation needs       Medical: None       Non-medical: None   Tobacco Use    Smoking status: Former Smoker    Smokeless tobacco: Never Used   Substance and Sexual Activity    Alcohol use: Yes       Alcohol/week: 1.0 standard drinks       Types: 1 Glasses of wine per week    Drug use: No    Sexual activity: None   Lifestyle    Physical activity       Days per week: None       Minutes per session: None    Stress: None   Relationships    Social connections       Talks on phone: None       Gets together: None       Attends Gnosticist service: None       Active member of club or organization: None       Attends meetings of clubs or organizations: None       Relationship status: None    Intimate partner violence       Fear of current or ex partner: None       Emotionally abused: None       Physically abused: None       Forced sexual activity: None   Other Topics Concern    None   Social History Narrative    None        Family History         Family History   Problem Relation Age of Onset    Heart Disease Mother      Diabetes Mother      Stroke Father          No Known Allergies  Patient has no known allergies. Vitals:     10/13/20 0927   BP: 101/60   Pulse: 64      Current Facility-Administered Medications   Current Outpatient Medications   Medication Sig Dispense Refill    HYDROcodone-acetaminophen (NORCO) 5-325 MG per tablet Take 1 tablet by mouth every 6 hours as needed for Pain.  HYDROcodone-acetaminophen (NORCO) 5-325 MG per tablet Take 1 tablet by mouth 2 times daily for 30 days. 60 tablet 0    gabapentin (NEURONTIN) 600 MG tablet Take 600 mg by mouth 3 times daily.         metoprolol tartrate (LOPRESSOR) 25 MG tablet TAKE ONE TABLET BY MOUTH ONE TIME DAILY   2    cephALEXin (KEFLEX) 500 MG capsule Take 1 capsule by mouth 4 times daily (Patient not taking: Reported on 10/13/2020) 28 capsule 0    terazosin (HYTRIN) 10 MG capsule TAKE 1 ORAL CAPSULE ONCE A DAY   1      No current patient and accompanying daughter  Findings discussed in detail     It showed severe lumbar spinal stenosis at L2-L3 and L3-L4  Also showed Modic changes severe in L2-L3 and L4  Spondylolisthesis in the lower lumbar spine seems stable  Will need flexion extension film     Axial imaging shows lumbar facet arthropathy and facet joint effusions     It is clear based on imaging and his clinical presentation that there are multiple etiologies for his back pain  I explained to the family that minimally invasive lumbar spine interspace or for the treatment of his spinal stenosis is the least available corrective intervention for him at this point  He will also require facet joint intervention to manage facet mediated pain and will probably will require basivertebral nerve ablation for Modic changes in lumbar spine     Patient wants to try lumbar facet injection and lumbar epidural injection  This seems reasonable  We will schedule for injections and will follow-up after that      Orders Placed This Encounter   Procedures    HI INJECT ANES/STEROID FORAMEN LUMBAR/SACRAL W IMG GUIDE ,1 LEVEL    HI INJ DX/THER AGNT PARAVERT FACET JOINT, LUMBAR/SAC, 1ST LEVEL        Encounter Medications    No orders of the defined types were placed in this encounter.             Electronically signed by Leela Brush MD on 10/13/2020 at 10:19 AM               Revision History

## 2020-11-16 NOTE — TELEPHONE ENCOUNTER
Renetta Rolanda is requesting a refill on the following medications:   Requested Prescriptions     Pending Prescriptions Disp Refills    HYDROcodone-acetaminophen (NORCO) 5-325 MG per tablet 60 tablet 0     Sig: Take 1 tablet by mouth 2 times daily for 30 days. Last OV 10/13/20. Pt had Lumbar facet steroid injection    Future Appointments   Date Time Provider Jace Britt   12/8/2020  2:40 Taylor Piedra MD [facility]   1/18/2021  2:00 PM Mauro Coffman MD [facility] None       [facility] report sent to Dr. Yessica Carrasquillo through alternative route for review.

## 2020-11-17 RX ORDER — HYDROCODONE BITARTRATE AND ACETAMINOPHEN 5; 325 MG/1; MG/1
1 TABLET ORAL 2 TIMES DAILY
Qty: 60 TABLET | Refills: 0 | Status: SHIPPED | OUTPATIENT
Start: 2020-11-17 | End: 2020-12-08 | Stop reason: SDUPTHER

## 2020-11-23 ENCOUNTER — APPOINTMENT (OUTPATIENT)
Dept: GENERAL RADIOLOGY | Age: 85
End: 2020-11-23
Attending: ANESTHESIOLOGY
Payer: MEDICARE

## 2020-11-23 ENCOUNTER — HOSPITAL ENCOUNTER (OUTPATIENT)
Age: 85
Setting detail: OUTPATIENT SURGERY
Discharge: HOME | End: 2020-11-23
Attending: ANESTHESIOLOGY | Admitting: ANESTHESIOLOGY
Payer: MEDICARE

## 2020-11-23 VITALS
SYSTOLIC BLOOD PRESSURE: 132 MMHG | HEART RATE: 56 BPM | RESPIRATION RATE: 16 BRPM | OXYGEN SATURATION: 98 % | DIASTOLIC BLOOD PRESSURE: 51 MMHG | TEMPERATURE: 97 F

## 2020-11-23 PROBLEM — M48.062 SPINAL STENOSIS OF LUMBAR REGION WITH NEUROGENIC CLAUDICATION: Chronic | Status: ACTIVE | Noted: 2020-10-13

## 2020-11-23 PROCEDURE — 7100000010 HC PHASE II RECOVERY - FIRST 15 MIN: Performed by: ANESTHESIOLOGY

## 2020-11-23 PROCEDURE — 2500000003 HC RX 250 WO HCPCS: Performed by: ANESTHESIOLOGY

## 2020-11-23 PROCEDURE — 7100000011 HC PHASE II RECOVERY - ADDTL 15 MIN: Performed by: ANESTHESIOLOGY

## 2020-11-23 PROCEDURE — 62323 NJX INTERLAMINAR LMBR/SAC: CPT | Performed by: ANESTHESIOLOGY

## 2020-11-23 PROCEDURE — 2709999900 HC NON-CHARGEABLE SUPPLY: Performed by: ANESTHESIOLOGY

## 2020-11-23 PROCEDURE — 6360000004 HC RX CONTRAST MEDICATION: Performed by: ANESTHESIOLOGY

## 2020-11-23 PROCEDURE — 3209999900 FLUORO FOR SURGICAL PROCEDURES

## 2020-11-23 PROCEDURE — 2580000003 HC RX 258: Performed by: ANESTHESIOLOGY

## 2020-11-23 PROCEDURE — 3600000050 HC PAIN LEVEL 1 BASE: Performed by: ANESTHESIOLOGY

## 2020-11-23 PROCEDURE — 6360000002 HC RX W HCPCS: Performed by: ANESTHESIOLOGY

## 2020-11-23 RX ORDER — LIDOCAINE HYDROCHLORIDE 10 MG/ML
INJECTION, SOLUTION INFILTRATION; PERINEURAL PRN
Status: DISCONTINUED | OUTPATIENT
Start: 2020-11-23 | End: 2020-11-23 | Stop reason: ALTCHOICE

## 2020-11-23 RX ORDER — SODIUM CHLORIDE 0.9 % (FLUSH) 0.9 %
10 SYRINGE (ML) INJECTION PRN
Status: DISCONTINUED | OUTPATIENT
Start: 2020-11-23 | End: 2020-11-23 | Stop reason: HOSPADM

## 2020-11-23 RX ORDER — DEXAMETHASONE SODIUM PHOSPHATE 10 MG/ML
INJECTION INTRAMUSCULAR; INTRAVENOUS PRN
Status: DISCONTINUED | OUTPATIENT
Start: 2020-11-23 | End: 2020-11-23 | Stop reason: ALTCHOICE

## 2020-11-23 RX ORDER — SODIUM CHLORIDE 0.9 % (FLUSH) 0.9 %
10 SYRINGE (ML) INJECTION EVERY 12 HOURS SCHEDULED
Status: DISCONTINUED | OUTPATIENT
Start: 2020-11-23 | End: 2020-11-23 | Stop reason: HOSPADM

## 2020-11-23 RX ADMIN — Medication 10 ML: at 13:47

## 2020-11-23 ASSESSMENT — PAIN SCALES - GENERAL
PAINLEVEL_OUTOF10: 8
PAINLEVEL_OUTOF10: 9
PAINLEVEL_OUTOF10: 8

## 2020-11-23 ASSESSMENT — PAIN DESCRIPTION - PAIN TYPE: TYPE: CHRONIC PAIN

## 2020-11-23 ASSESSMENT — PAIN - FUNCTIONAL ASSESSMENT: PAIN_FUNCTIONAL_ASSESSMENT: 0-10

## 2020-11-23 NOTE — H&P
History and Physical Service   [facility] - [facility]PATIENT    HISTORY AND PHYSICAL EXAMINATION            Date of Evaluation: 11/23/2020  Patient name:  Isaias Ocasio  MRN:   [id number]  YOB: 1929  PCP:    Mariola Charles MD    History Obtained From:     Patient, medical records    History of Present Illness: This is Isaias Ocasio a 80 y.o. male who presents today for a BILATERAL EPIDURAL  STEROID INJECTION AT L 3 TO S 1 FOR TREATMENT OF LOW BACK PAIN ASSOCIATED WITH SPINAL STENOSIS LUMBAR REGION WITH NEUROGENIC CLAUDICATION   by Dr. Teresa Cid. THE PATIENT HAS SEVERE BACK AND LEG PAIN AT 20/10 WHEN HE WAKES UP IN THE MORNING. HE HAS DIFFICULTY  WALKING AND USES A WALKER DUE TO LEG WEAKNESS. HE PRESENTS FOR BILATERAL EPIDURAL STEROID INJECTION. HE TAKES ASA 81 MG , LAST DOSE WAS 11/19/2020 . HE DOES NOT HAVE DIABETES. HE HAD 2 TEASPOONS OATMEAL  AND A CUP OF BLACK COFFEE THIS AM AT 8 AM. HE PRESENTS FOR PAIN INJECTION. Past Medical History:     Past Medical History:   Diagnosis Date    Chronic back pain     Enlarged prostate     Hypertension         Past Surgical History:     Past Surgical History:   Procedure Laterality Date    EYE SURGERY      HERNIA REPAIR  2006    PAIN MANAGEMENT PROCEDURE Bilateral 11/12/2020    BILATERAL LUMBAR FACET STEROID INJECTION    L3-S1 performed by Marleen Linder MD at [address]  07/20/2011        Medications Prior to Admission:     Prior to Admission medications    Medication Sig Start Date End Date Taking? Authorizing Provider   HYDROcodone-acetaminophen (NORCO) 5-325 MG per tablet Take 1 tablet by mouth every 6 hours as needed for Pain. Yes Historical Provider, MD   gabapentin (NEURONTIN) 600 MG tablet Take 600 mg by mouth 3 times daily.    Yes Historical Provider, MD   metoprolol tartrate (LOPRESSOR) 25 MG tablet TAKE ONE TABLET BY MOUTH ONE TIME DAILY 6/17/17  Yes Historical Provider, MD   terazosin (HYTRIN) 10 MG capsule TAKE 1 ORAL CAPSULE ONCE A DAY 6/15/17  Yes Historical Provider, MD   HYDROcodone-acetaminophen (NORCO) 5-325 MG per tablet Take 1 tablet by mouth 2 times daily for 30 days. 11/17/20 12/17/20  Annie Qiu MD   aspirin 81 MG EC tablet Take 81 mg by mouth daily    Historical Provider, MD        Allergies:     Patient has no known allergies. Social History:     Tobacco:    reports that he has quit smoking. He has never used smokeless tobacco.  Alcohol:      reports current alcohol use of about 1.0 standard drinks of alcohol per week. Drug Use:  reports no history of drug use. Family History:     Family History   Problem Relation Age of Onset    Heart Disease Mother     Diabetes Mother     Stroke Father        Review of Systems:     Positive and Negative as described in HPI. CONSTITUTIONAL:  negative for fevers, chills, sweats, fatigue, weight loss  HEENT:  negative for vision, hearing changes, runny nose, throat pain  RESPIRATORY:  negative for shortness of breath, cough, congestion, wheezing. CARDIOVASCULAR:  negative for chest pain, palpitations. HX HYPERTENSION AND TACHYCARDIA   GASTROINTESTINAL:  negative for nausea, vomiting, diarrhea, constipation, change in bowel habits, abdominal pain   GENITOURINARY:  negative for difficulty of urination, burning with urination, frequency   INTEGUMENT:  negative for rash, skin lesions, easy bruising   HEMATOLOGIC/LYMPHATIC:  negative for swelling/edema   ALLERGIC/IMMUNOLOGIC:  negative for urticaria , itching  ENDOCRINE:  negative increase in drinking, increase in urination, hot or cold intolerance  MUSCULOSKELETAL:  negative joint pains, muscle aches, swelling of joints  NEUROLOGICAL:  negative for headaches, dizziness, lightheadedness, numbness, pain, tingling extremities  BEHAVIOR/PSYCH:  negative for depression, anxiety    Physical Exam:   /69   Pulse 64   Temp 97.7 °F (36.5 °C) (Temporal)   Resp 18   SpO2 95%   No LMP for male patient.   No obstetric history on file. No results for input(s): POCGLU in the last 72 hours. General Appearance:  alert, well appearing, and in no acute distress  Mental status: oriented to person, place, and time with normal affect  Head:  normocephalic, atraumatic. Eye: no icterus, redness, pupils equal and reactive, extraocular eye movements intact, conjunctiva clear  Ear: normal external ear, no discharge, hearing intact  Nose:  no drainage noted  Mouth: mucous membranes moist  Neck: supple, no carotid bruits, thyroid not palpable  Lungs: Bilateral equal air entry, clear to ausculation, no wheezing, rales or rhonchi, normal effort  Cardiovascular: HR  normal rate, regular rhythm,SOFT KRISTAL 1 /6  murmur, NO gallop, rub. Abdomen: Soft, nontender, nondistended, normal bowel sounds  Neurologic: There are no new focal motor or sensory deficits, normal muscle tone and bulk, no abnormal sensation, normal speech, cranial nerves II through XII grossly intact  Skin: No gross lesions, rashes, bruising or bleeding on exposed skin area  Extremities:  peripheral pulses palpable, no pedal edema or calf pain with palpation  Psych: normal affect     Investigations:      Laboratory Testing:  No results found for this or any previous visit (from the past 24 hour(s)). No results for input(s): HGB, HCT, WBC, MCV, PLATELET, NA, K, CL, CO2, BUN, CREATININE, GLUCOSE, INR, PROTIME, APTT, AST, ALT, LABALBU, HCG in the last 720 hours. No results for input(s): COVID19 in the last 720 hours. Imaging/Diagnostics:    Fluoro For Surgical Procedures    Result Date: 11/12/2020  Radiology exam is complete. No Radiologist dictation. Please follow up with ordering provider. Diagnosis:      1. CHRONIC LUMBAR PAIN ASSOCIATED WITH SPINAL STENOSIS AND NEUROGENIC CLAUDICATION     Plans:     1.  EPIDURAL STEROID INJECTION      Lavetta Canavan, APRN - CNP  11/23/2020  1:41 PM

## 2020-11-23 NOTE — OP NOTE
Operative Note      Patient: Annie Spivey  YOB: 1929  MRN: [id number]    Date of Procedure: 11/23/2020    Pre-Op Diagnosis: DX SPINAL STENOSIS LUMBAR REGION W/ NEUROGENIC CLAUDICATION    Post-Op Diagnosis: Same       Procedure(s):  BILATERAL LUMBAR FACET STEROID INJECTION L3-S1    Surgeon(s):  Mirta Chambers MD    Assistant:   * No surgical staff found *    Anesthesia: Local    Estimated Blood Loss (mL): Minimal    Complications: None    Specimens:   * No specimens in log *    Implants:  * No implants in log *      Drains: * No LDAs found *    Findings: N/A    Detailed Description of Procedure:     Patient Name: Annie Spivey   YOB: 1929  Room/Bed: STAZ OR Pool/NONE  Medical Record Number: [id number]  Date: 11/23/2020       Sedation/ Anesthesia Plan:   intravenous sedation   as needed. Medications Planned:   midazolam (Versed) / Fentanyl  Intravenously  as needed. Preoperative Diagnosis:    Lumbar spinal stenosis with neurogenic claudication    Postoperative Diagnosis:    Lumbar spinal stenosis with neurogenic claudication    Procedure Performed:  Lumbar epidural steroid injection under fluoroscopy guidance AT L4-5 LEVEL;    Blood Loss: None    Procedure: The Patient was seen in the preop area, chart was reviewed, informed consent was obtained. Patient was taken to procedure room and was placed in prone position. Vital signs were monitored through out the  Procedure. A time out was completed. The skin over the back was prepped and draped in sterile manner. The target point was marked at The interlaminar space at L4-5 . Skin and deep tissues were anesthetized with 1 % lidocaine. A 20-gauge Tuohy epidural needlele was advanced  under fluoroscopy guidance in AP view. Epidural space was identified using MAYRA technique. Position ws confirmed in Lateral view.    Then after negative aspiration contrast dye Omnipaque-180 was injected with live fluoroscopy in AP views that showed  spread of the contrast in the epidural space  and no vascular runoff or intrathecal spread. Finally 10 ml of treatment solution containing 5 ml of  1 % PF lidocaine and 4 ml of PF NS and 1 ml of kenalog 40 mg / ml was injected. The needle was removed and a Band-Aid was placed over the needle  insertion site. The patient's vital signs remained stable and the patient tolerated the procedure well.       Electronically signed by Moe Leblanc MD on 11/23/2020 at 2:19 PM

## 2020-12-08 ENCOUNTER — OFFICE VISIT (OUTPATIENT)
Dept: PAIN MANAGEMENT | Age: 85
End: 2020-12-08
Payer: MEDICARE

## 2020-12-08 VITALS
HEIGHT: 69 IN | DIASTOLIC BLOOD PRESSURE: 54 MMHG | HEART RATE: 92 BPM | SYSTOLIC BLOOD PRESSURE: 104 MMHG | BODY MASS INDEX: 24.22 KG/M2

## 2020-12-08 PROCEDURE — 1036F TOBACCO NON-USER: CPT | Performed by: ANESTHESIOLOGY

## 2020-12-08 PROCEDURE — G8484 FLU IMMUNIZE NO ADMIN: HCPCS | Performed by: ANESTHESIOLOGY

## 2020-12-08 PROCEDURE — G8420 CALC BMI NORM PARAMETERS: HCPCS | Performed by: ANESTHESIOLOGY

## 2020-12-08 PROCEDURE — 1123F ACP DISCUSS/DSCN MKR DOCD: CPT | Performed by: ANESTHESIOLOGY

## 2020-12-08 PROCEDURE — 4040F PNEUMOC VAC/ADMIN/RCVD: CPT | Performed by: ANESTHESIOLOGY

## 2020-12-08 PROCEDURE — G8427 DOCREV CUR MEDS BY ELIG CLIN: HCPCS | Performed by: ANESTHESIOLOGY

## 2020-12-08 PROCEDURE — 99213 OFFICE O/P EST LOW 20 MIN: CPT | Performed by: ANESTHESIOLOGY

## 2020-12-08 RX ORDER — HYDROCODONE BITARTRATE AND ACETAMINOPHEN 5; 325 MG/1; MG/1
1 TABLET ORAL 2 TIMES DAILY
Qty: 60 TABLET | Refills: 0 | Status: SHIPPED | OUTPATIENT
Start: 2020-12-17 | End: 2021-01-16

## 2020-12-08 ASSESSMENT — ENCOUNTER SYMPTOMS
RESPIRATORY NEGATIVE: 1
BACK PAIN: 1

## 2020-12-08 NOTE — PROGRESS NOTES
The patient is a 80 y. o. Non-/non  male. Chief Complaint   Patient presents with    Back Pain    Follow Up After Procedure    Medication Refill       HPI    Chronic lower back pain with significant morning stiffness  Back pain with radiation down both legs  Onset many many years ago  Symptoms of progressively worsened  He recently moved from [place] back to [place] to his family  Had injections in [place]  Had a recent MRI lumbar spine that showed severe multilevel lumbar spinal stenosis with facet arthropathy and degenerative disc changes and Modic changes    Patient underwent 2 different procedure lumbar facet injection and lumbar epidural injection  Today here for postprocedure follow-up  He report that the facet injection did not provide him any relief for any duration while the epidural injection provided 8200% relief but only lasted for a day and then the pain came back    Currently is taking gabapentin along with hydrocodone  Denies any side effect from the medication  Finds the medication helpful  States that it takes the edge off and allow him to continue daily activities without going through severe pain  No history of illicit substance use  Patient is not interested in any surgery at this time    S/P:BILATERAL LUMBAR FACET STEROID INJECTION L3-S1 DOS11/23/20    BILATERAL LUMBAR FACET STEROID INJECTION    L3-S1 DOS 11/12/20    Outcome   Any improvement of activity?   No   Any side effects (appetite,leg cramping,facial fleshing):none   Increase of pain:  No  Pain score Today:  4  % of pain relief:0%  Pain diary (medial branch block): No      Pain score Today:  4  Adverse effects (Constipation / Nausea / Sedation / sexual Dysfunction / others) : constipation  Mood: good  Sleep pattern and quality: fair  Activity level: poor    Pill count Today:Pt did not bring medication  Last dose taken  12/7/20  OARRS report reviewed today: yes  ER/Hospitalizations/PCP visit related to pain since last visit:no   Any legal problems e.g. DUI etc.:No  Satisfied with current management: Yes    Opioid Contract:none  Last Urine Dug screen dated:9/18/20    Lab Results   Component Value Date    LABA1C 6.0 08/28/2020     Lab Results   Component Value Date     08/28/2020       Past Medical History, Past Surgical History, Social History, Allergies and Medications, reviewed and updated in EPIC as indicated    Past Medical History:   Diagnosis Date    Chronic back pain     Enlarged prostate     Hypertension       Past Surgical History:   Procedure Laterality Date    EYE SURGERY      HERNIA REPAIR  2006    PAIN MANAGEMENT PROCEDURE Bilateral 11/12/2020    BILATERAL LUMBAR FACET STEROID INJECTION    L3-S1 performed by Yesika Valerio MD at [address] Bilateral 11/23/2020    BILATERAL LUMBAR FACET STEROID INJECTION L3-S1 performed by Yesika Valerio MD at [facility]  07/20/2011     Social History     Socioeconomic History    Marital status:      Spouse name: None    Number of children: None    Years of education: None    Highest education level: None   Occupational History    None   Social Needs    Financial resource strain: None    Food insecurity     Worry: None     Inability: None    Transportation needs     Medical: None     Non-medical: None   Tobacco Use    Smoking status: Former Smoker    Smokeless tobacco: Never Used   Substance and Sexual Activity    Alcohol use:  Yes     Alcohol/week: 1.0 standard drinks     Types: 1 Glasses of wine per week    Drug use: No    Sexual activity: None   Lifestyle    Physical activity     Days per week: None     Minutes per session: None    Stress: None   Relationships    Social connections     Talks on phone: None     Gets together: None     Attends [employer] service: None     Active member of club or organization: None     Attends meetings of clubs or organizations: None     Relationship status: None    Intimate partner violence     Fear of current or ex partner: None     Emotionally abused: None     Physically abused: None     Forced sexual activity: None   Other Topics Concern    None   Social History Narrative    None     Family History   Problem Relation Age of Onset    Heart Disease Mother     Diabetes Mother     Stroke Father      Allergies   Allergen Reactions    Hydrocodone-Acetaminophen      Hydrocodone-acetaminophen   Vitals:    12/08/20 1437   BP: (!) 104/54   Pulse: 92     Current Outpatient Medications   Medication Sig Dispense Refill    [START ON 12/17/2020] HYDROcodone-acetaminophen (NORCO) 5-325 MG per tablet Take 1 tablet by mouth 2 times daily for 30 days. 60 tablet 0    aspirin 81 MG EC tablet Take 81 mg by mouth daily      HYDROcodone-acetaminophen (NORCO) 5-325 MG per tablet Take 1 tablet by mouth every 6 hours as needed for Pain.  gabapentin (NEURONTIN) 600 MG tablet Take 600 mg by mouth 3 times daily.  metoprolol tartrate (LOPRESSOR) 25 MG tablet TAKE ONE TABLET BY MOUTH ONE TIME DAILY  2    terazosin (HYTRIN) 10 MG capsule TAKE 1 ORAL CAPSULE ONCE A DAY  1     No current facility-administered medications for this visit. Review of Systems   Constitutional: Negative. Negative for fever. Respiratory: Negative. Musculoskeletal: Positive for back pain. Neurological: Positive for weakness and numbness. Objective:  General Appearance:  Uncomfortable. Vital signs: (most recent): Blood pressure (!) 104/54, pulse 92, height 5' 9\" (1.753 m). Vital signs are normal.  No fever. Output: Producing urine and producing stool. HEENT: Normal HEENT exam.    Lungs:  Normal effort and normal respiratory rate. Breath sounds clear to auscultation. He is not in respiratory distress. Heart: Normal rate. Extremities: Normal range of motion. There is no deformity. Neurological: Patient is alert and oriented to person, place and time.   Patient has normal coordination. Pupils:  Pupils are equal, round, and reactive to light. Pupils are equal.   Skin:  Warm and dry. No rash or cyanosis. Assessment & Plan       Chronic lower back pain with significant morning stiffness  Back pain with radiation down both legs  Onset many many years ago  Symptoms of progressively worsened  He recently moved from [place] back to [place] to his family  Had injections in [place]  Had a recent MRI lumbar spine that showed severe multilevel lumbar spinal stenosis with facet arthropathy and degenerative disc changes and Modic changes    Patient underwent 2 different procedure lumbar facet injection and lumbar epidural injection  Today here for postprocedure follow-up  He report that the facet injection did not provide him any relief for any duration while the epidural injection provided 8200% relief but only lasted for a day and then the pain came back    Currently is taking gabapentin along with hydrocodone  Denies any side effect from the medication  Finds the medication helpful  States that it takes the edge off and allow him to continue daily activities without going through severe pain  No history of illicit substance use  Patient is not interested in any surgery at this time  1. Spinal stenosis of lumbar region with neurogenic claudication    2. Chronic bilateral low back pain with bilateral sciatica    3. Lumbosacral spondylosis without myelopathy        No orders of the defined types were placed in this encounter. Orders Placed This Encounter   Medications    HYDROcodone-acetaminophen (NORCO) 5-325 MG per tablet     Sig: Take 1 tablet by mouth 2 times daily for 30 days.      Dispense:  60 tablet     Refill:  0     Reduce doses taken as pain becomes manageable        Controlled Substance Monitoring:    Acute and Chronic Pain Monitoring:   RX Monitoring 12/8/2020   Periodic Controlled Substance Monitoring Possible medication side effects, risk of tolerance/dependence & alternative treatments discussed. ;No signs of potential drug abuse or diversion identified. ;Assessed functional status. ;Obtaining appropriate analgesic effect of treatment. Chronic Pain > 50 MEDD Obtained or confirmed \"Consent for Opioid Use\" on file.      Automated prescription report reviewed  Safe use of medication discussed  Refill ordered  Next office visit virtual  Follow-up in 2 months        Electronically signed by Jo Hobbs MD on 12/8/2020 at 2:57 PM

## 2021-01-05 RX ORDER — GABAPENTIN 600 MG/1
600 TABLET ORAL 3 TIMES DAILY
Qty: 90 TABLET | Status: CANCELLED | OUTPATIENT
Start: 2021-01-05

## 2021-01-05 ASSESSMENT — ENCOUNTER SYMPTOMS
CONSTIPATION: 1
RESPIRATORY NEGATIVE: 1
BACK PAIN: 1

## 2021-01-05 NOTE — PROGRESS NOTES
The patient is a 80 y. o. Non-/non  male. Chief Complaint   Patient presents with    Follow-up    Medication Refill        HPI   Chief Complaint: back pain   Medication Refill: norco   66-year-old man with history of chronic axial lower back pain associated with significant morning stiffness  Report radiation and extension of pain in both legs  Pain aggravated with excessive activity particularly walking  Patient tried lumbar facet injection and lumbar epidural injection  Reported only short-term relief with epidural injection  Have tried different modalities in past  Recent imaging had shown multilevel lumbar facet arthropathy and spinal stenosis  Currently taking Norco  Report no side effect  Finds the medication helpful allowing him to do daily life activity  No other changes in health history  Pain score Today:  10  Adverse effects (Constipation / Nausea / Sedation / sexual Dysfunction / others) : yes   Mood: fair  Sleep pattern and quality: poor  Activity level: poor    Pill count Today: 32  Last dose take today  OARRS report reviewed today: yes  ER/Hospitalizations/PCP visit related to pain since last visit:no   Any legal problems e.g. DUI etc.:No  Satisfied with current management: Yes    Opioid Contract: none on file. Last Urine Dug screen dated: 9/29/2020    Lab Results   Component Value Date    LABA1C 5.7 01/05/2021     Lab Results   Component Value Date     08/28/2020       Past Medical History, Past Surgical History, Social History, Allergies and Medications reviewed and updated in EPIC as indicated    Family History reviewed and is noncontributory.           Past Medical History:   Diagnosis Date    Chronic back pain     Enlarged prostate     Hypertension       Past Surgical History:   Procedure Laterality Date    EYE SURGERY      HERNIA REPAIR  2006    PAIN MANAGEMENT PROCEDURE Bilateral 11/12/2020    BILATERAL LUMBAR FACET STEROID INJECTION    L3-S1 performed by Shruthi Perkins Moshe Villa MD at [address] [facility] Bilateral 11/23/2020    BILATERAL LUMBAR FACET STEROID INJECTION L3-S1 performed by Kalli Ford MD at [address]  07/20/2011     Social History     Socioeconomic History    Marital status:      Spouse name: Not on file    Number of children: Not on file    Years of education: Not on file    Highest education level: Not on file   Occupational History    Not on file   Social Needs    Financial resource strain: Not on file    Food insecurity     Worry: Not on file     Inability: Not on file    Transportation needs     Medical: Not on file     Non-medical: Not on file   Tobacco Use    Smoking status: Former Smoker    Smokeless tobacco: Never Used   Substance and Sexual Activity    Alcohol use: Yes     Alcohol/week: 1.0 standard drinks     Types: 1 Glasses of wine per week    Drug use: No    Sexual activity: Not on file   Lifestyle    Physical activity     Days per week: Not on file     Minutes per session: Not on file    Stress: Not on file   Relationships    Social connections     Talks on phone: Not on file     Gets together: Not on file     Attends Uatsdin service: Not on file     Active member of club or organization: Not on file     Attends meetings of clubs or organizations: Not on file     Relationship status: Not on file    Intimate partner violence     Fear of current or ex partner: Not on file     Emotionally abused: Not on file     Physically abused: Not on file     Forced sexual activity: Not on file   Other Topics Concern    Not on file   Social History Narrative    Not on file     Family History   Problem Relation Age of Onset    Heart Disease Mother     Diabetes Mother     Stroke Father      Allergies   Allergen Reactions    Hydrocodone-Acetaminophen      Hydrocodone-acetaminophen   There were no vitals filed for this visit.   Current Outpatient Medications   Medication Sig Dispense Refill    [START ON 1/18/2021] HYDROcodone-acetaminophen (NORCO) 5-325 MG per tablet Take 1 tablet by mouth 2 times daily as needed for Pain for up to 30 days. 60 tablet 0    HYDROcodone-acetaminophen (NORCO) 5-325 MG per tablet Take 1 tablet by mouth 2 times daily for 30 days. 60 tablet 0    aspirin 81 MG EC tablet Take 81 mg by mouth daily      gabapentin (NEURONTIN) 600 MG tablet Take 600 mg by mouth 3 times daily.  metoprolol tartrate (LOPRESSOR) 25 MG tablet TAKE ONE TABLET BY MOUTH ONE TIME DAILY  2    terazosin (HYTRIN) 10 MG capsule TAKE 1 ORAL CAPSULE ONCE A DAY  1     No current facility-administered medications for this visit. Review of Systems   Constitutional: Negative. Respiratory: Negative. Gastrointestinal: Positive for constipation. Genitourinary: Negative. Musculoskeletal: Positive for back pain. Objective:  General Appearance:  Well-appearing and in no acute distress. Vital signs: (most recent): There were no vitals taken for this visit. Output: Producing urine and producing stool. HEENT: (No visible masses in neck  Range of motion appears normal on cervical spine  External ears appears normal)    Lungs:  Normal effort. He is not in respiratory distress. Neurological: Patient is alert and oriented to person, place and time.  (Able to follow command    Psych  Mood is good  Affect is normal). Skin:  No rash or cyanosis.       Assessment & Plan   57-year-old man with history of chronic axial lower back pain associated with significant morning stiffness  Report radiation and extension of pain in both legs  Pain aggravated with excessive activity particularly walking  Patient tried lumbar facet injection and lumbar epidural injection  Reported only short-term relief with epidural injection  Have tried different modalities in past  Recent imaging had shown multilevel lumbar facet arthropathy and spinal stenosis  Currently taking Norco  Report no side effect  Finds the medication helpful allowing him to do daily life activity  No other changes in health history    1. Lumbosacral spondylosis without myelopathy    2. Spinal stenosis of lumbar region with neurogenic claudication    3. Chronic use of opiate drug for therapeutic purpose        No orders of the defined types were placed in this encounter. Orders Placed This Encounter   Medications    HYDROcodone-acetaminophen (NORCO) 5-325 MG per tablet     Sig: Take 1 tablet by mouth 2 times daily as needed for Pain for up to 30 days. Dispense:  60 tablet     Refill:  0     Reduce doses taken as pain becomes manageable        Controlled Substance Monitoring:    Acute and Chronic Pain Monitoring:   RX Monitoring 12/8/2020   Periodic Controlled Substance Monitoring Possible medication side effects, risk of tolerance/dependence & alternative treatments discussed. ;No signs of potential drug abuse or diversion identified. ;Assessed functional status. ;Obtaining appropriate analgesic effect of treatment. Chronic Pain > 50 MEDD Obtained or confirmed \"Consent for Opioid Use\" on file. Shannon Hand is a 80 y.o. male being evaluated by a Virtual Visit (video visit) encounter to address concerns as mentioned above. A caregiver was present when appropriate. Due to this being a TeleHealth encounter ([facility]), evaluation of the following organ systems was limited: Vitals/Constitutional/EENT/Resp/CV/GI//MS/Neuro/Skin/Heme-Lymph-Imm. Pursuant to the emergency declaration under the [facility][note 3][facility], [address] [place] [address] [facility] authority and the [employer] and [employer] Act, this Virtual Visit was conducted with patient's (and/or legal guardian's) consent, to reduce the patient's risk of exposure to COVID-19 and provide necessary medical care.   The patient (and/or legal guardian) has also been advised to contact this office for worsening conditions or problems, and seek emergency medical treatment and/or call 911 if deemed necessary. Patient identification was verified at the start of the visit: Yes    Total time spent for this encounter: Not billed by time    Services were provided through a video synchronous discussion virtually to substitute for in-person clinic visit. Patient and provider were located at their individual homes. --Prashant Ibanez MD on 1/6/2021 at 10:09 AM    An electronic signature was used to authenticate this note.     Electronically signed by Prashant Ibanez MD on 1/6/2021 at 10:09 AM

## 2021-01-06 ENCOUNTER — VIRTUAL VISIT (OUTPATIENT)
Dept: PAIN MANAGEMENT | Age: 86
End: 2021-01-06
Payer: MEDICARE

## 2021-01-06 DIAGNOSIS — M47.817 LUMBOSACRAL SPONDYLOSIS WITHOUT MYELOPATHY: Primary | Chronic | ICD-10-CM

## 2021-01-06 DIAGNOSIS — Z79.891 CHRONIC USE OF OPIATE DRUG FOR THERAPEUTIC PURPOSE: ICD-10-CM

## 2021-01-06 DIAGNOSIS — M48.062 SPINAL STENOSIS OF LUMBAR REGION WITH NEUROGENIC CLAUDICATION: Chronic | ICD-10-CM

## 2021-01-06 PROCEDURE — 4040F PNEUMOC VAC/ADMIN/RCVD: CPT | Performed by: ANESTHESIOLOGY

## 2021-01-06 PROCEDURE — 99213 OFFICE O/P EST LOW 20 MIN: CPT | Performed by: ANESTHESIOLOGY

## 2021-01-06 PROCEDURE — 1123F ACP DISCUSS/DSCN MKR DOCD: CPT | Performed by: ANESTHESIOLOGY

## 2021-01-06 PROCEDURE — G8427 DOCREV CUR MEDS BY ELIG CLIN: HCPCS | Performed by: ANESTHESIOLOGY

## 2021-01-06 RX ORDER — HYDROCODONE BITARTRATE AND ACETAMINOPHEN 5; 325 MG/1; MG/1
1 TABLET ORAL 2 TIMES DAILY PRN
Qty: 60 TABLET | Refills: 0 | Status: SHIPPED | OUTPATIENT
Start: 2021-01-18 | End: 2021-02-19 | Stop reason: SDUPTHER

## 2021-02-19 DIAGNOSIS — M47.817 LUMBOSACRAL SPONDYLOSIS WITHOUT MYELOPATHY: Chronic | ICD-10-CM

## 2021-02-19 DIAGNOSIS — M48.062 SPINAL STENOSIS OF LUMBAR REGION WITH NEUROGENIC CLAUDICATION: Chronic | ICD-10-CM

## 2021-02-19 RX ORDER — HYDROCODONE BITARTRATE AND ACETAMINOPHEN 5; 325 MG/1; MG/1
1 TABLET ORAL 2 TIMES DAILY PRN
Qty: 60 TABLET | Refills: 0 | Status: SHIPPED | OUTPATIENT
Start: 2021-02-19 | End: 2021-04-12 | Stop reason: SDUPTHER

## 2021-02-19 NOTE — TELEPHONE ENCOUNTER
Shannon Hand is requesting a refill on the following medications:   Requested Prescriptions     Pending Prescriptions Disp Refills    HYDROcodone-acetaminophen (NORCO) 5-325 MG per tablet 60 tablet 0     Sig: Take 1 tablet by mouth 2 times daily as needed for Pain for up to 30 days. Last OV 1/6/21    Future Appointments   Date Time Provider Jace De La Torre   3/2/2021  2:00 PM Zack Melo MD [facility]       OARRS report sent to Dr. Lady Carr through alternative route for review.

## 2021-03-31 ENCOUNTER — TELEPHONE (OUTPATIENT)
Dept: PAIN MANAGEMENT | Age: 86
End: 2021-03-31

## 2021-04-02 ENCOUNTER — TELEPHONE (OUTPATIENT)
Dept: PAIN MANAGEMENT | Age: 86
End: 2021-04-02

## 2021-04-09 DIAGNOSIS — M48.062 SPINAL STENOSIS OF LUMBAR REGION WITH NEUROGENIC CLAUDICATION: Chronic | ICD-10-CM

## 2021-04-09 DIAGNOSIS — M47.817 LUMBOSACRAL SPONDYLOSIS WITHOUT MYELOPATHY: Chronic | ICD-10-CM

## 2021-04-09 NOTE — TELEPHONE ENCOUNTER
Robb Schneider is requesting a refill on the following medications:   Requested Prescriptions     Pending Prescriptions Disp Refills    HYDROcodone-acetaminophen (NORCO) 5-325 MG per tablet 60 tablet 0     Sig: Take 1 tablet by mouth 2 times daily as needed for Pain for up to 30 days. Last OV 1/6/21    Future Appointments   Date Time Provider Jace De La Torre   4/21/2021  8:40 AM Lynne Dang MD [facility]       OARRS report sent to Dr. Joselito Garcia through alternative route for review.        Patient has appointment on 4/20/21

## 2021-04-12 RX ORDER — HYDROCODONE BITARTRATE AND ACETAMINOPHEN 5; 325 MG/1; MG/1
1 TABLET ORAL 2 TIMES DAILY PRN
Qty: 60 TABLET | Refills: 0 | Status: SHIPPED | OUTPATIENT
Start: 2021-04-12 | End: 2021-05-14 | Stop reason: SDUPTHER

## 2021-04-20 ENCOUNTER — OFFICE VISIT (OUTPATIENT)
Dept: PAIN MANAGEMENT | Age: 86
End: 2021-04-20
Payer: MEDICARE

## 2021-04-20 VITALS
HEIGHT: 69 IN | HEART RATE: 60 BPM | BODY MASS INDEX: 23.85 KG/M2 | DIASTOLIC BLOOD PRESSURE: 61 MMHG | WEIGHT: 161 LBS | OXYGEN SATURATION: 96 % | SYSTOLIC BLOOD PRESSURE: 111 MMHG

## 2021-04-20 DIAGNOSIS — M48.062 SPINAL STENOSIS OF LUMBAR REGION WITH NEUROGENIC CLAUDICATION: Primary | Chronic | ICD-10-CM

## 2021-04-20 DIAGNOSIS — M47.817 LUMBOSACRAL SPONDYLOSIS WITHOUT MYELOPATHY: Chronic | ICD-10-CM

## 2021-04-20 PROCEDURE — 4040F PNEUMOC VAC/ADMIN/RCVD: CPT | Performed by: ANESTHESIOLOGY

## 2021-04-20 PROCEDURE — G8420 CALC BMI NORM PARAMETERS: HCPCS | Performed by: ANESTHESIOLOGY

## 2021-04-20 PROCEDURE — 1036F TOBACCO NON-USER: CPT | Performed by: ANESTHESIOLOGY

## 2021-04-20 PROCEDURE — G8427 DOCREV CUR MEDS BY ELIG CLIN: HCPCS | Performed by: ANESTHESIOLOGY

## 2021-04-20 PROCEDURE — 99213 OFFICE O/P EST LOW 20 MIN: CPT | Performed by: ANESTHESIOLOGY

## 2021-04-20 PROCEDURE — 1123F ACP DISCUSS/DSCN MKR DOCD: CPT | Performed by: ANESTHESIOLOGY

## 2021-04-20 ASSESSMENT — ENCOUNTER SYMPTOMS
BACK PAIN: 1
RESPIRATORY NEGATIVE: 1

## 2021-04-20 NOTE — PROGRESS NOTES
None    Years of education: None    Highest education level: None   Occupational History    None   Social Needs    Financial resource strain: None    Food insecurity     Worry: None     Inability: None    Transportation needs     Medical: None     Non-medical: None   Tobacco Use    Smoking status: Former Smoker    Smokeless tobacco: Never Used   Substance and Sexual Activity    Alcohol use: Yes     Alcohol/week: 1.0 standard drinks     Types: 1 Glasses of wine per week    Drug use: No    Sexual activity: None   Lifestyle    Physical activity     Days per week: None     Minutes per session: None    Stress: None   Relationships    Social connections     Talks on phone: None     Gets together: None     Attends Caodaism service: None     Active member of club or organization: None     Attends meetings of clubs or organizations: None     Relationship status: None    Intimate partner violence     Fear of current or ex partner: None     Emotionally abused: None     Physically abused: None     Forced sexual activity: None   Other Topics Concern    None   Social History Narrative    None     Family History   Problem Relation Age of Onset    Heart Disease Mother     Diabetes Mother     Stroke Father      Allergies   Allergen Reactions    Hydrocodone-Acetaminophen      Hydrocodone-acetaminophen   Vitals:    04/20/21 0928   BP: 111/61   Pulse: 60   SpO2: 96%     Current Outpatient Medications   Medication Sig Dispense Refill    finasteride (PROSCAR) 5 MG tablet Take 1 tablet by mouth daily 30 tablet 3    HYDROcodone-acetaminophen (NORCO) 5-325 MG per tablet Take 1 tablet by mouth 2 times daily as needed for Pain for up to 30 days. 60 tablet 0    Handicap Placard MISC Handicap Placard valid for 5 years.  Expires 2026 1 each 0    bumetanide (BUMEX) 2 MG tablet Take 1 tablet by mouth daily 30 tablet 3    potassium chloride (KLOR-CON M) 20 MEQ extended release tablet Take 1 tablet by mouth daily 60 tablet

## 2021-04-22 ENCOUNTER — TELEPHONE (OUTPATIENT)
Dept: PAIN MANAGEMENT | Age: 86
End: 2021-04-22

## 2021-04-26 ENCOUNTER — APPOINTMENT (OUTPATIENT)
Dept: GENERAL RADIOLOGY | Age: 86
End: 2021-04-26
Attending: ANESTHESIOLOGY
Payer: MEDICARE

## 2021-04-26 ENCOUNTER — HOSPITAL ENCOUNTER (OUTPATIENT)
Age: 86
Setting detail: OUTPATIENT SURGERY
Discharge: HOME | End: 2021-04-26
Attending: ANESTHESIOLOGY | Admitting: ANESTHESIOLOGY
Payer: MEDICARE

## 2021-04-26 VITALS
TEMPERATURE: 97.4 F | WEIGHT: 161 LBS | SYSTOLIC BLOOD PRESSURE: 148 MMHG | OXYGEN SATURATION: 98 % | BODY MASS INDEX: 23.85 KG/M2 | DIASTOLIC BLOOD PRESSURE: 55 MMHG | HEART RATE: 63 BPM | RESPIRATION RATE: 17 BRPM | HEIGHT: 69 IN

## 2021-04-26 PROCEDURE — 99152 MOD SED SAME PHYS/QHP 5/>YRS: CPT | Performed by: ANESTHESIOLOGY

## 2021-04-26 PROCEDURE — 3600000050 HC PAIN LEVEL 1 BASE: Performed by: ANESTHESIOLOGY

## 2021-04-26 PROCEDURE — 2709999900 HC NON-CHARGEABLE SUPPLY: Performed by: ANESTHESIOLOGY

## 2021-04-26 PROCEDURE — 3209999900 FLUORO FOR SURGICAL PROCEDURES

## 2021-04-26 PROCEDURE — 64483 NJX AA&/STRD TFRM EPI L/S 1: CPT | Performed by: ANESTHESIOLOGY

## 2021-04-26 PROCEDURE — 7100000011 HC PHASE II RECOVERY - ADDTL 15 MIN: Performed by: ANESTHESIOLOGY

## 2021-04-26 PROCEDURE — 7100000010 HC PHASE II RECOVERY - FIRST 15 MIN: Performed by: ANESTHESIOLOGY

## 2021-04-26 PROCEDURE — 6360000004 HC RX CONTRAST MEDICATION: Performed by: ANESTHESIOLOGY

## 2021-04-26 PROCEDURE — 6360000002 HC RX W HCPCS: Performed by: ANESTHESIOLOGY

## 2021-04-26 PROCEDURE — 3600000051 HC PAIN LEVEL 1 ADDL 15 MIN: Performed by: ANESTHESIOLOGY

## 2021-04-26 PROCEDURE — 2500000003 HC RX 250 WO HCPCS: Performed by: ANESTHESIOLOGY

## 2021-04-26 RX ORDER — ACETAMINOPHEN 325 MG/1
650 TABLET ORAL EVERY 6 HOURS PRN
COMMUNITY

## 2021-04-26 RX ORDER — DEXAMETHASONE SODIUM PHOSPHATE 10 MG/ML
INJECTION INTRAMUSCULAR; INTRAVENOUS PRN
Status: DISCONTINUED | OUTPATIENT
Start: 2021-04-26 | End: 2021-04-26 | Stop reason: ALTCHOICE

## 2021-04-26 RX ORDER — MULTIVIT-MIN/IRON/FOLIC ACID/K 18-600-40
1 CAPSULE ORAL DAILY
COMMUNITY

## 2021-04-26 RX ORDER — FENTANYL CITRATE 50 UG/ML
INJECTION, SOLUTION INTRAMUSCULAR; INTRAVENOUS PRN
Status: DISCONTINUED | OUTPATIENT
Start: 2021-04-26 | End: 2021-04-26 | Stop reason: ALTCHOICE

## 2021-04-26 RX ORDER — SODIUM CHLORIDE 0.9 % (FLUSH) 0.9 %
5-40 SYRINGE (ML) INJECTION 2 TIMES DAILY
Status: CANCELLED | OUTPATIENT
Start: 2021-04-26

## 2021-04-26 RX ORDER — METOPROLOL TARTRATE 5 MG/5ML
INJECTION INTRAVENOUS PRN
Status: DISCONTINUED | OUTPATIENT
Start: 2021-04-26 | End: 2021-04-26 | Stop reason: ALTCHOICE

## 2021-04-26 RX ORDER — LIDOCAINE HYDROCHLORIDE 10 MG/ML
INJECTION, SOLUTION EPIDURAL; INFILTRATION; INTRACAUDAL; PERINEURAL PRN
Status: DISCONTINUED | OUTPATIENT
Start: 2021-04-26 | End: 2021-04-26 | Stop reason: ALTCHOICE

## 2021-04-26 RX ORDER — MULTIVIT WITH MINERALS/LUTEIN
250 TABLET ORAL DAILY
COMMUNITY

## 2021-04-26 ASSESSMENT — PAIN DESCRIPTION - DESCRIPTORS: DESCRIPTORS: CONSTANT;DISCOMFORT

## 2021-04-26 ASSESSMENT — PAIN - FUNCTIONAL ASSESSMENT
PAIN_FUNCTIONAL_ASSESSMENT: PREVENTS OR INTERFERES SOME ACTIVE ACTIVITIES AND ADLS
PAIN_FUNCTIONAL_ASSESSMENT: 0-10

## 2021-04-26 ASSESSMENT — PAIN SCALES - GENERAL
PAINLEVEL_OUTOF10: 7
PAINLEVEL_OUTOF10: 0
PAINLEVEL_OUTOF10: 0

## 2021-04-26 NOTE — H&P
History and Physical Update    Pt Name: Elana Acosta  MRN: [id number]  YOB: 1929  Date of evaluation: 4/26/2021      [x] I have reviewed the progress note found in Epic by Dr. Sil Anne from 04/20/2021 which meets the criteria for an Interval History and Physical note. [x] I have examined  Elana Acosta a 80 y.o., male who is scheduled for a right L3 and left L4 epidural steroid injections by Dr. Sil Anne due to spinal stenosis lumbar region with neurogenic claudication. The patient denies health changes since his appointment with Dr. Sil Anne on 04/20/2021. Pt denies fever, chills, productive cough, SOB, chest pain, open sores, rashes, wounds, and history of diabetes. Aspirin was last taken 4-5 days ago. Vital signs: BP (!) 153/57   Pulse 67   Temp 98 °F (36.7 °C) (Temporal)   Resp 18   SpO2 97%      Allergies:  Hydrocodone-acetaminophen    Past medical history, surgical history, social history, and family history were reviewed and updated in EPIC as indicated. Medications:    Prior to Admission medications    Medication Sig Start Date End Date Taking? Authorizing Provider   finasteride (PROSCAR) 5 MG tablet Take 1 tablet by mouth daily 4/19/21   Enmanuel Zhang MD   HYDROcodone-acetaminophen (NORCO) 5-325 MG per tablet Take 1 tablet by mouth 2 times daily as needed for Pain for up to 30 days. 4/12/21 5/12/21  Maria Alejandra Glez MD   Handicap [facility] Handicap Placard valid for 5 years.  Expires 2026 4/5/21   Enmanuel Zhang MD   bumetanide [facility]) 2 MG tablet Take 1 tablet by mouth daily 3/1/21   Enmanuel Zhang MD   potassium chloride (KLOR-CON M) 20 MEQ extended release tablet Take 1 tablet by mouth daily 3/1/21   Enmanuel Zhang MD   metoprolol succinate (TOPROL XL) 25 MG extended release tablet Take 1 tablet by mouth daily 2/3/21   Enmanuel Zhang MD   aspirin 81 MG EC tablet Take 81 mg by mouth daily    Historical Provider, MD   gabapentin (NEURONTIN) 600 MG tablet Take 600 mg by mouth 3 times daily. Historical Provider, MD       This is a 80 y.o. male who is pleasant, cooperative, alert and oriented x 3, in no acute distress. Heart: Murmur 2/6. Regular rate and rhythm without gallop or rub. Lungs: Normal respiratory effort, unlabored, and clear to auscultation without wheezes or rales bilaterally. Abdomen: Rounded. Soft, nontender, and active bowel sounds. Pedal pulses: are palpable bilaterally. Labs:  No results for input(s): HGB, HCT, WBC, MCV, PLT, NA, K, CL, CO2, BUN, CREATININE, GLUCOSE, INR, PROTIME, APTT, AST, ALT, LABALBU, HCG in the last 720 hours. No results for input(s): COVID19 in the last 720 hours. Severa Corner APRN, FNP-BC  Electronically signed 4/26/2021 at 12:20 PM      Love Arguelles MD   Physician   Specialty:  Pain Management   Progress Notes   Signed   Encounter Date:  4/20/2021         Related encounter: Office Visit from 4/20/2021 in [facility] [place]         Signed        Expand AllCollapse All     Show:Clear all  [x]Manual[x]Template[]Copied    Added by:  Padma Husain, Tim Null MD    []ver for details   The patient is a 80 y. o. Non-/non  male. Chief Complaint   Patient presents with    Back Pain    Medication Refill       Gabapentin          Back Pain  Associated symptoms include numbness and weakness. Pertinent negatives include no fever.       80year-old pleasant male with history of chronic low back pain intermittent radiation down both legs  Pain aggravated with routine activity particularly standing and walking  He is diagnosed with severe lumbar spinal stenosis  Pain interfere with quality of life  Currently taking gabapentin 3 times a day along with Norco 1 tablet as needed  Reports no side effect  Finds the medication helpful  No changes in bladder or bowel control  Patient had epidural injection in past with the intermediate term relief  He is interested in repeat procedure None       Gets together: None       Attends Alevism service: None       Active member of club or organization: None       Attends meetings of clubs or organizations: None       Relationship status: None    Intimate partner violence       Fear of current or ex partner: None       Emotionally abused: None       Physically abused: None       Forced sexual activity: None   Other Topics Concern    None   Social History Narrative    None         Family History         Family History   Problem Relation Age of Onset    Heart Disease Mother      Diabetes Mother      Stroke Father                Allergies   Allergen Reactions    Hydrocodone-Acetaminophen        Hydrocodone-acetaminophen       Vitals:     04/20/21 0928   BP: 111/61   Pulse: 60   SpO2: 96%      Current Facility-Administered Medications          Current Outpatient Medications   Medication Sig Dispense Refill    finasteride (PROSCAR) 5 MG tablet Take 1 tablet by mouth daily 30 tablet 3    HYDROcodone-acetaminophen (NORCO) 5-325 MG per tablet Take 1 tablet by mouth 2 times daily as needed for Pain for up to 30 days. 60 tablet 0    Handicap Placard MISC Handicap Placard valid for 5 years. Expires 2026 1 each 0    bumetanide (BUMEX) 2 MG tablet Take 1 tablet by mouth daily 30 tablet 3    potassium chloride (KLOR-CON M) 20 MEQ extended release tablet Take 1 tablet by mouth daily 60 tablet 3    metoprolol succinate (TOPROL XL) 25 MG extended release tablet Take 1 tablet by mouth daily 90 tablet 1    aspirin 81 MG EC tablet Take 81 mg by mouth daily        gabapentin (NEURONTIN) 600 MG tablet Take 600 mg by mouth 3 times daily. No current facility-administered medications for this visit. Review of Systems   Constitutional: Negative. Negative for fever. Respiratory: Negative. Musculoskeletal: Positive for arthralgias and back pain. Neurological: Positive for weakness and numbness.        Objective:  General Appearance: Uncomfortable and in pain. Vital signs: (most recent): Blood pressure 111/61, pulse 60, height 5' 9\" (1.753 m), weight 161 lb (73 kg), SpO2 96 %. Vital signs are normal.  No fever. Output: Producing urine and producing stool. HEENT: Normal HEENT exam.    Lungs:  Normal effort and normal respiratory rate. Breath sounds clear to auscultation. He is not in respiratory distress. Heart: Normal rate. Extremities: Normal range of motion. There is no deformity. Neurological: Patient is alert and oriented to person, place and time. Patient has normal coordination. Pupils:  Pupils are equal, round, and reactive to light. Pupils are equal.   Skin:  Warm and dry. No rash or cyanosis. Assessment & Plan  1. Spinal stenosis of lumbar region with neurogenic claudication    2. Lumbosacral spondylosis without myelopathy            Orders Placed This Encounter   Procedures    NM NJX AA&/STRD TFRML EPI LUMBAR/SACRAL 1 LEVEL        Encounter Medications    No orders of the defined types were placed in this encounter.       Schedule patient for lumbar epidural injection  Last injection was 5 months ago  Automated prescription report reviewed  Safe use of medication discussed  No refill needed  Patient will call for refill of Norco and gabapentin           Electronically signed by Hannah Chang MD on 4/20/2021 at 10:06 AM             Revision History

## 2021-04-26 NOTE — OP NOTE
Operative Note      Patient: Torin Overton  YOB: 1929  MRN: [id number]    Date of Procedure: 4/26/2021    Pre-Op Diagnosis: DX SPINAL STENOSIS LUMBAR REGION W/ NEUROGENIC CLAUDICATION    Post-Op Diagnosis: Same       Procedure(s):  RIGHT L3 AND LEFT L4 EPIDURAL STEROID INJECTION    Surgeon(s):  Love Arguelles MD    Assistant:   * No surgical staff found *    Anesthesia: IV Sedation    Estimated Blood Loss (mL): Minimal    Complications: None    Specimens:   * No specimens in log *    Implants:  * No implants in log *      Drains: * No LDAs found *    Findings: n/a    Detailed Description of Procedure:     Patient Name: Torin Overton   YOB: 1929  Room/Bed: STAZ OR Pool/NONE  Medical Record Number: [id number]  Date: 4/26/2021       Preoperative Diagnosis:    Lumbar spinal stenosis  Right lumbar radiculitis  Chronic lower back pain    Postoperative diagnosis:   Lumbar spinal stenosis  Right lumbar radiculitis  Chronic lower back pain      Blood Loss: none    Procedure Performed:  Transforaminal lumbar epidural steroid injection at the levels of L4 on the Right side under fluoroscopic guidance. Procedure: The Patient was seen in the preop area, chart was reviewed, informed consent was obtained. Patient was taken to procedure room and was placed in prone position. Vital signs were monitored through out the  Procedure. A time out was completed. The skin over the back was prepped and draped in sterile manner. The target point was marked in ipsilateral obliques just below the pedicle at the target levels. Skin and deep tissues were anesthetized with 1 % lidocaine. A 20-gauge, spinal needle was advanced  under fluoroscopy guidance in AP / Oblique and lateral views, such that the tip of the needle lies in the neuroforamina at about the 6 o'clock position under the pedicle of the target vertebra. This was confirmed with AP and lateral views of the fluoroscopy.      Then after negative aspiration contrast dye Omnipaque-180 was injected with live fluoroscopy in AP views that showed  spread of the contrast in the epidural space  and no vascular runoff or intrathecal spread. Finally 5 ml of treatment solution containing 4 ml of  1 % PF lidocaine and 1 ml of dexamethasone 10 mg / ml was injected. The needle was removed and a Band-Aid was placed over the needle  insertion site. The patient's vital signs remained stable and the patient tolerated the procedure well. Electronically signed by Guerda Barragan MD on 4/26/2021 at 2:55 PM      SEDATION NOTE:    ASA CLASSIFICATION  3  MP   CLASSIFICATION  2    · Moderate intravenous conscious sedation was supervised by Dr. Cailin Whelan  . The patient was independently monitored by a Registered Nurse assigned to the Procedure Room  . Monitoring included automated blood pressure, continuous EKG, Capnography and continuous pulse oximetry. . The detailed Conscious Record is permanently stored in the [place] [id number].      The following is the conscious sedation record;  Start Time:  1436  End times:  1454  Duration:  18 minutes  MEDS GIVEN 0 MG VERSED AND 25 MCG FENTANYL      Electronically signed by Guerda Barragan MD on 4/26/2021 at 2:54 PM

## 2021-05-13 DIAGNOSIS — M48.062 SPINAL STENOSIS OF LUMBAR REGION WITH NEUROGENIC CLAUDICATION: Chronic | ICD-10-CM

## 2021-05-13 DIAGNOSIS — M47.817 LUMBOSACRAL SPONDYLOSIS WITHOUT MYELOPATHY: Chronic | ICD-10-CM

## 2021-05-13 NOTE — TELEPHONE ENCOUNTER
Robb Jennie is requesting a refill on the following medications:   Requested Prescriptions     Pending Prescriptions Disp Refills    HYDROcodone-acetaminophen (NORCO) 5-325 MG per tablet 60 tablet 0     Sig: Take 1 tablet by mouth 2 times daily as needed for Pain for up to 30 days. Last OV 4/20/21. Pt states he only has one pill left    Future Appointments   Date Time Provider Jace De La Torre   6/7/2021  1:40 PM Kimani Diaz, KHALIF - CNP [facility]       OARRS report sent to Dr. Joselito Garcia through alternative route for review.

## 2021-05-13 NOTE — TELEPHONE ENCOUNTER
Attempted to reach pt to reschedule procedure. Pt is booked in a slot that is already occupied. Patient did not answer and has no voicemail set yop.  Will try to call pt again later and offer to reschedule for 4/29/21
Pt returned phone call and rescheduled procedure to 4/26/21 at 1:20 pm
PMD

## 2021-05-14 ENCOUNTER — TELEPHONE (OUTPATIENT)
Dept: PAIN MANAGEMENT | Age: 86
End: 2021-05-14

## 2021-05-14 RX ORDER — HYDROCODONE BITARTRATE AND ACETAMINOPHEN 5; 325 MG/1; MG/1
1 TABLET ORAL 2 TIMES DAILY PRN
Qty: 60 TABLET | Refills: 0 | Status: SHIPPED | OUTPATIENT
Start: 2021-05-14 | End: 2021-06-16 | Stop reason: SDUPTHER

## 2021-05-14 NOTE — TELEPHONE ENCOUNTER
Patient called stating that he called yesterday for a refill of his Norco and needed it sent over.  Called him back and let him know Ze Mendoza CNP sent it to his pharmacy

## 2021-06-16 ENCOUNTER — OFFICE VISIT (OUTPATIENT)
Dept: PAIN MANAGEMENT | Age: 86
End: 2021-06-16
Payer: MEDICARE

## 2021-06-16 VITALS — BODY MASS INDEX: 22.9 KG/M2 | HEIGHT: 70 IN | WEIGHT: 160 LBS

## 2021-06-16 DIAGNOSIS — Z79.891 CHRONIC USE OF OPIATE DRUG FOR THERAPEUTIC PURPOSE: Primary | ICD-10-CM

## 2021-06-16 DIAGNOSIS — M48.062 SPINAL STENOSIS OF LUMBAR REGION WITH NEUROGENIC CLAUDICATION: Chronic | ICD-10-CM

## 2021-06-16 DIAGNOSIS — M47.817 LUMBOSACRAL SPONDYLOSIS WITHOUT MYELOPATHY: Chronic | ICD-10-CM

## 2021-06-16 PROCEDURE — 4040F PNEUMOC VAC/ADMIN/RCVD: CPT | Performed by: ANESTHESIOLOGY

## 2021-06-16 PROCEDURE — 99214 OFFICE O/P EST MOD 30 MIN: CPT | Performed by: ANESTHESIOLOGY

## 2021-06-16 PROCEDURE — G8420 CALC BMI NORM PARAMETERS: HCPCS | Performed by: ANESTHESIOLOGY

## 2021-06-16 PROCEDURE — G8427 DOCREV CUR MEDS BY ELIG CLIN: HCPCS | Performed by: ANESTHESIOLOGY

## 2021-06-16 PROCEDURE — 1036F TOBACCO NON-USER: CPT | Performed by: ANESTHESIOLOGY

## 2021-06-16 PROCEDURE — 1123F ACP DISCUSS/DSCN MKR DOCD: CPT | Performed by: ANESTHESIOLOGY

## 2021-06-16 RX ORDER — HYDROCODONE BITARTRATE AND ACETAMINOPHEN 5; 325 MG/1; MG/1
1 TABLET ORAL 2 TIMES DAILY PRN
Qty: 60 TABLET | Refills: 0 | Status: SHIPPED | OUTPATIENT
Start: 2021-06-16 | End: 2021-07-13 | Stop reason: SDUPTHER

## 2021-06-16 RX ORDER — GABAPENTIN 600 MG/1
600 TABLET ORAL 3 TIMES DAILY
Qty: 90 TABLET | Refills: 1 | Status: SHIPPED | OUTPATIENT
Start: 2021-06-16 | End: 2021-07-13 | Stop reason: SDUPTHER

## 2021-06-16 RX ORDER — MAGNESIUM OXIDE 400 MG/1
400 TABLET ORAL DAILY
COMMUNITY
End: 2021-08-10

## 2021-06-16 ASSESSMENT — ENCOUNTER SYMPTOMS
RESPIRATORY NEGATIVE: 1
BACK PAIN: 1

## 2021-06-16 NOTE — PROGRESS NOTES
The patient is a 80 y. o. Non-/non  male. Chief Complaint   Patient presents with    Back Pain    Medication Refill    Follow Up After Procedure        HPI    43-year-old man with history of chronic lower back pain  Reports radiation of pain down both legs are aggravated with standing and walking right side more affected than left  No loss of bladder or bowel control  Is diagnosed with severe lumbar spinal stenosis  Had a recent lumbar epidural injection  Reports no improvement in pain  Currently taking Norco along with gabapentin  He moved from [place] and was on this medication regimen before  Reports no side effect from the medication  Finds medication little bit helpful    Had recent MRI lumbar spine  Not interested in any surgery      S/P:Transforaminal lumbar epidural steroid injection at the levels of L4 on the Right side DOS 4/26/21      Outcome   Any improvement of activity? No   Any side effects (appetite,leg cramping,facial fleshing):no   Increase of pain:  Yes  Pain score Today:  8  % of pain relief:0%  Pain diary (medial branch block): No    Medication Refill: Norco    Pain score Today:  8  Adverse effects (Constipation / Nausea / Sedation / sexual Dysfunction / others) : none  Mood: good  Sleep pattern and quality: fair  Activity level: fair    Pill count Today:0  Last dose taken  6/16/21  OARRS report reviewed today: yes  ER/Hospitalizations/PCP visit related to pain since last visit:no   Any legal problems e.g. DUI etc.:No  Satisfied with current management: Yes    Opioid Contract:9/18/20  Last Urine Dug screen dated:9/18/20    Lab Results   Component Value Date    LABA1C 5.7 01/05/2021     Lab Results   Component Value Date     08/28/2020       Past Medical History, Past Surgical History, Social History, Allergies and Medications reviewed and updated in EPIC as indicated    Family History reviewed and is noncontributory.         Past Medical History:   Diagnosis Date     Marital Status:    Intimate Partner Violence:     Fear of Current or Ex-Partner:     Emotionally Abused:     Physically Abused:     Sexually Abused:      Family History   Problem Relation Age of Onset    Heart Disease Mother     Diabetes Mother     Stroke Father      No Known Allergies  Patient has no known allergies. There were no vitals filed for this visit. Current Outpatient Medications   Medication Sig Dispense Refill    hydrocortisone 2.5 % cream Apply topically 2 times daily      magnesium oxide (MAG-OX) 400 MG tablet Take 400 mg by mouth daily      HYDROcodone-acetaminophen (NORCO) 5-325 MG per tablet Take 1 tablet by mouth 2 times daily as needed for Pain for up to 30 days. 60 tablet 0    gabapentin (NEURONTIN) 600 MG tablet Take 1 tablet by mouth 3 times daily for 30 days. 90 tablet 1    terazosin (HYTRIN) 10 MG capsule TAKE 1 ORAL CAPSULE ONCE A DAY 30 capsule 0    finasteride (PROSCAR) 5 MG tablet Take 1 tablet by mouth daily 30 tablet 0    metoprolol succinate (TOPROL XL) 25 MG extended release tablet Take 1 tablet by mouth daily 30 tablet 0    acetaminophen (TYLENOL) 325 MG tablet Take 650 mg by mouth every 6 hours as needed for Pain      Multiple Vitamin (M.V.I. ADULT IV) Infuse 1 tablet intravenously daily      Ascorbic Acid (VITAMIN C) 250 MG tablet Take 250 mg by mouth daily      Cholecalciferol (VITAMIN D) 50 MCG (2000 UT) CAPS capsule Take 1 capsule by mouth daily      bumetanide (BUMEX) 2 MG tablet Take 1 tablet by mouth daily 30 tablet 3    potassium chloride (KLOR-CON M) 20 MEQ extended release tablet Take 1 tablet by mouth daily 60 tablet 3    aspirin 81 MG EC tablet Take 81 mg by mouth daily      Handicap Placard MISC Handicap Placard valid for 5 years. Expires 2026 1 each 0     No current facility-administered medications for this visit. Review of Systems   Constitutional: Negative. Negative for fever. Respiratory: Negative.     Musculoskeletal: Positive for arthralgias and back pain. Neurological: Positive for weakness and numbness. Objective:  General Appearance:  Uncomfortable and in pain. Vital signs: (most recent): Height 5' 9.5\" (1.765 m), weight 160 lb (72.6 kg). Vital signs are normal.  No fever. Output: Producing urine and producing stool. HEENT: Normal HEENT exam.    Lungs:  Normal effort and normal respiratory rate. Breath sounds clear to auscultation. He is not in respiratory distress. Heart: Normal rate. Extremities: Normal range of motion. There is no deformity. Neurological: Patient is alert and oriented to person, place and time. Patient has normal coordination. Pupils:  Pupils are equal, round, and reactive to light. Pupils are equal.   Skin:  Warm and dry. No rash or cyanosis. Assessment & Plan   75-year-old man with history of chronic lower back pain  Reports radiation of pain down both legs are aggravated with standing and walking right side more affected than left  No loss of bladder or bowel control  Is diagnosed with severe lumbar spinal stenosis  Had a recent lumbar epidural injection  Reports no improvement in pain  Currently taking Norco along with gabapentin  He moved from [place] and was on this medication regimen before  Reports no side effect from the medication  Finds medication little bit helpful    Had recent MRI lumbar spine  Not interested in any surgery    1. Chronic use of opiate drug for therapeutic purpose    2. Lumbosacral spondylosis without myelopathy    3. Spinal stenosis of lumbar region with neurogenic claudication          Considering severe pain, I discussed her further treatment plan  I will recommend x-ray flexion-extension films to rule out spine instability  I will recommend interlaminar epidural steroid injection with epidurogram to determine the needle.   Placement approach for mild procedure  Will recommend for minimally invasive lumbar decompression using mild device  If that fails then spine interspace or Vertiflex    All treatment protocol plan was discussed with patient and accompanying daughter  They will think about it and contact us    Automated prescription report reviewed  No sign or symptom of any aberrancy  Severe of medication discussed  Refill ordered    No orders of the defined types were placed in this encounter. Orders Placed This Encounter   Medications    HYDROcodone-acetaminophen (NORCO) 5-325 MG per tablet     Sig: Take 1 tablet by mouth 2 times daily as needed for Pain for up to 30 days. Dispense:  60 tablet     Refill:  0     Reduce doses taken as pain becomes manageable    gabapentin (NEURONTIN) 600 MG tablet     Sig: Take 1 tablet by mouth 3 times daily for 30 days. Dispense:  90 tablet     Refill:  1        Controlled Substance Monitoring:    Acute and Chronic Pain Monitoring:   RX Monitoring 6/16/2021   Periodic Controlled Substance Monitoring Possible medication side effects, risk of tolerance/dependence & alternative treatments discussed. ;No signs of potential drug abuse or diversion identified. ;Assessed functional status. ;Obtaining appropriate analgesic effect of treatment. Chronic Pain > 50 MEDD Obtained or confirmed \"Consent for Opioid Use\" on file.          Electronically signed by Torin Tipton MD on 6/16/2021 at 10:53 AM

## 2021-07-13 ENCOUNTER — OFFICE VISIT (OUTPATIENT)
Dept: PAIN MANAGEMENT | Age: 86
End: 2021-07-13
Payer: MEDICARE

## 2021-07-13 VITALS — BODY MASS INDEX: 23.63 KG/M2 | HEIGHT: 69 IN

## 2021-07-13 DIAGNOSIS — M48.062 SPINAL STENOSIS OF LUMBAR REGION WITH NEUROGENIC CLAUDICATION: Primary | Chronic | ICD-10-CM

## 2021-07-13 DIAGNOSIS — M47.817 LUMBOSACRAL SPONDYLOSIS WITHOUT MYELOPATHY: Chronic | ICD-10-CM

## 2021-07-13 DIAGNOSIS — Z79.891 CHRONIC USE OF OPIATE DRUG FOR THERAPEUTIC PURPOSE: ICD-10-CM

## 2021-07-13 PROCEDURE — 4040F PNEUMOC VAC/ADMIN/RCVD: CPT | Performed by: ANESTHESIOLOGY

## 2021-07-13 PROCEDURE — G8420 CALC BMI NORM PARAMETERS: HCPCS | Performed by: ANESTHESIOLOGY

## 2021-07-13 PROCEDURE — 99213 OFFICE O/P EST LOW 20 MIN: CPT | Performed by: ANESTHESIOLOGY

## 2021-07-13 PROCEDURE — 1036F TOBACCO NON-USER: CPT | Performed by: ANESTHESIOLOGY

## 2021-07-13 PROCEDURE — G8427 DOCREV CUR MEDS BY ELIG CLIN: HCPCS | Performed by: ANESTHESIOLOGY

## 2021-07-13 PROCEDURE — 1123F ACP DISCUSS/DSCN MKR DOCD: CPT | Performed by: ANESTHESIOLOGY

## 2021-07-13 RX ORDER — GABAPENTIN 600 MG/1
600 TABLET ORAL 3 TIMES DAILY
Qty: 90 TABLET | Refills: 1 | Status: CANCELLED | OUTPATIENT
Start: 2021-07-13 | End: 2021-08-12

## 2021-07-13 RX ORDER — HYDROCODONE BITARTRATE AND ACETAMINOPHEN 5; 325 MG/1; MG/1
1 TABLET ORAL 2 TIMES DAILY PRN
Qty: 60 TABLET | Refills: 0 | Status: CANCELLED | OUTPATIENT
Start: 2021-07-13 | End: 2021-08-12

## 2021-07-13 RX ORDER — HYDROCODONE BITARTRATE AND ACETAMINOPHEN 5; 325 MG/1; MG/1
1 TABLET ORAL 2 TIMES DAILY PRN
Qty: 60 TABLET | Refills: 0 | Status: SHIPPED | OUTPATIENT
Start: 2021-07-16 | End: 2021-08-16 | Stop reason: SDUPTHER

## 2021-07-13 RX ORDER — GABAPENTIN 600 MG/1
600 TABLET ORAL 3 TIMES DAILY
Qty: 90 TABLET | Refills: 1 | Status: SHIPPED | OUTPATIENT
Start: 2021-07-13 | End: 2021-08-10

## 2021-07-13 ASSESSMENT — ENCOUNTER SYMPTOMS
RESPIRATORY NEGATIVE: 1
BACK PAIN: 1

## 2021-07-13 NOTE — PROGRESS NOTES
The patient is a 80 y. o. Non-/non  male. No chief complaint on file. HPI    Chief Complaint:  Medication Refill:     Pain score Today:  {NUMBERS 0-10:}  Adverse effects (Constipation / Nausea / Sedation / sexual Dysfunction / others) : ***  Mood: {DESC; GOOD/FAIR/POOR:03756}  Sleep pattern and quality: {DESC; GOOD/FAIR/POOR:47638}  Activity level: {DESC; GOOD/FAIR/POOR:76810}    Pill count Today:***  Last dose taken  {DATE/MONTH/YEAR:}  OARRS report reviewed today: yes  ER/Hospitalizations/PCP visit related to pain since last visit:{YES/NO/WILD CARDS:72640}   Any legal problems e.g. DUI etc.:{YES / V}  Satisfied with current management: {YES / QJ:20966}    Opioid Contract:21  Last Urine Dug screen dated:20    Lab Results   Component Value Date    LABA1C 5.7 2021     Lab Results   Component Value Date     2020       Past Medical History, Past Surgical History, Social History, Allergies and Medications reviewed and updated in EPIC as indicated    Family History reviewed and is noncontributory.       Past Medical History:   Diagnosis Date    Cardiac murmur     Chronic back pain     Enlarged prostate     Hypertension       Past Surgical History:   Procedure Laterality Date    EYE SURGERY      HERNIA REPAIR  2006    PAIN MANAGEMENT PROCEDURE Bilateral 2020    BILATERAL LUMBAR FACET STEROID INJECTION    L3-S1 performed by Julia Carpenter MD at [place] [address] Bilateral 2020    BILATERAL LUMBAR FACET STEROID INJECTION L3-S1 performed by Julia Carpenter MD at [place] [address] Bilateral 2021    RIGHT L3 AND LEFT L4 EPIDURAL STEROID INJECTION performed by Julia Carpenter MD at [address] [facility]  2011     Social History     Socioeconomic History    Marital status:      Spouse name: Not on file    Number of children: Not on file    Years of education: Not on file   Chela Servin Highest education level: Not on file   Occupational History    Not on file   Tobacco Use    Smoking status: Former Smoker    Smokeless tobacco: Never Used    Tobacco comment: quit 35 years ago    Substance and Sexual Activity    Alcohol use: Yes     Alcohol/week: 1.0 standard drinks     Types: 1 Glasses of wine per week    Drug use: No    Sexual activity: Not on file   Other Topics Concern    Not on file   Social History Narrative    Not on file     Social Determinants of Health     Financial Resource Strain:     Difficulty of Paying Living Expenses:    Food Insecurity:     Worried About Running Out of Food in the Last Year:     [address] in the Last Year:    Transportation Needs:     Lack of Transportation (Medical):  Lack of Transportation (Non-Medical):    Physical Activity:     Days of Exercise per Week:     Minutes of Exercise per Session:    Stress:     Feeling of Stress :    Social Connections:     Frequency of Communication with Friends and Family:     Frequency of Social Gatherings with Friends and Family:     Attends Buddhist Services:     Active Member of Clubs or Organizations:     Attends Club or Organization Meetings:     Marital Status:    Intimate Partner Violence:     Fear of Current or Ex-Partner:     Emotionally Abused:     Physically Abused:     Sexually Abused:      Family History   Problem Relation Age of Onset    Heart Disease Mother     Diabetes Mother     Stroke Father      No Known Allergies  Patient has no known allergies. There were no vitals filed for this visit. Current Outpatient Medications   Medication Sig Dispense Refill    hydrocortisone 2.5 % cream Apply topically 2 times daily      magnesium oxide (MAG-OX) 400 MG tablet Take 400 mg by mouth daily      HYDROcodone-acetaminophen (NORCO) 5-325 MG per tablet Take 1 tablet by mouth 2 times daily as needed for Pain for up to 30 days.  60 tablet 0    gabapentin (NEURONTIN) 600 MG tablet Take 1 tablet by mouth 3 times daily for 30 days. 90 tablet 1    terazosin (HYTRIN) 10 MG capsule TAKE 1 ORAL CAPSULE ONCE A DAY 30 capsule 0    finasteride (PROSCAR) 5 MG tablet Take 1 tablet by mouth daily 30 tablet 0    metoprolol succinate (TOPROL XL) 25 MG extended release tablet Take 1 tablet by mouth daily 30 tablet 0    acetaminophen (TYLENOL) 325 MG tablet Take 650 mg by mouth every 6 hours as needed for Pain      Multiple Vitamin (M.V.I. ADULT IV) Infuse 1 tablet intravenously daily      Ascorbic Acid (VITAMIN C) 250 MG tablet Take 250 mg by mouth daily      Cholecalciferol (VITAMIN D) 50 MCG (2000 UT) CAPS capsule Take 1 capsule by mouth daily      Handicap Placard MISC Handicap Placard valid for 5 years. Expires 2026 1 each 0    bumetanide (BUMEX) 2 MG tablet Take 1 tablet by mouth daily 30 tablet 3    potassium chloride (KLOR-CON M) 20 MEQ extended release tablet Take 1 tablet by mouth daily 60 tablet 3    aspirin 81 MG EC tablet Take 81 mg by mouth daily       No current facility-administered medications for this visit. Review of Systems    Objective   Assessment & Plan  No diagnosis found. No orders of the defined types were placed in this encounter. No orders of the defined types were placed in this encounter.          Electronically signed by Bon Leal MA on 7/13/2021 at 3:11 PM

## 2021-07-13 NOTE — PROGRESS NOTES
The patient is a 80 y. o. Non-/non  male. Chief Complaint   Patient presents with    Follow-up    Medication Refill    Back Pain        HPI  80-year-old man with chronic back pain radiation down both legs aggravated with standing and walking alleviates with rest right side more affected than left  No loss of bladder or bowel control  Diagnosed with severe lumbar spinal stenosis  Tried epidural injection medication physical therapy  I have suggested minimally invasive lumbar decompression using mild procedure  Patient have not decided yet  Currently take Norco along with gabapentin  Find it a little bit helpful  Pain is poorly controlled  Reports no side effect from the medication    Medication Refill: Norco    Pain score Today:  8  Adverse effects (Constipation / Nausea / Sedation / sexual Dysfunction / others) : none  Mood: fair  Sleep pattern and quality: fair  Activity level: fair    Pill count Today:Pt did not bring medication, states # 2 left  Last dose taken  7/13/21  OARRS report reviewed today: yes  ER/Hospitalizations/PCP visit related to pain since last visit:no   Any legal problems e.g. DUI etc.:No  Satisfied with current management: Yes    Opioid Contract:9/18/20  Last Urine Dug screen dated:9/18/20    Lab Results   Component Value Date    LABA1C 5.7 01/05/2021     Lab Results   Component Value Date     08/28/2020       Past Medical History, Past Surgical History, Social History, Allergies and Medications reviewed and updated in EPIC as indicated    Family History reviewed and is noncontributory.         Past Medical History:   Diagnosis Date    Cardiac murmur     Chronic back pain     Enlarged prostate     Hypertension       Past Surgical History:   Procedure Laterality Date    EYE SURGERY      HERNIA REPAIR  2006    PAIN MANAGEMENT PROCEDURE Bilateral 11/12/2020    BILATERAL LUMBAR FACET STEROID INJECTION    L3-S1 performed by Nico Keith MD at [place] [address] MANAGEMENT PROCEDURE Bilateral 11/23/2020    BILATERAL LUMBAR FACET STEROID INJECTION L3-S1 performed by Bhargavi Cabral MD at [address] Bilateral 4/26/2021    RIGHT L3 AND LEFT L4 EPIDURAL STEROID INJECTION performed by Bhargavi Cabral MD at [address] [place]  07/20/2011     Social History     Socioeconomic History    Marital status:      Spouse name: None    Number of children: None    Years of education: None    Highest education level: None   Occupational History    None   Tobacco Use    Smoking status: Former Smoker    Smokeless tobacco: Never Used    Tobacco comment: quit 35 years ago    Substance and Sexual Activity    Alcohol use: Yes     Alcohol/week: 1.0 standard drinks     Types: 1 Glasses of wine per week    Drug use: No    Sexual activity: None   Other Topics Concern    None   Social History Narrative    None     Social Determinants of Health     Financial Resource Strain:     Difficulty of Paying Living Expenses:    Food Insecurity:     Worried About Running Out of Food in the Last Year:     Ran Out of Food in the Last Year:    Transportation Needs:     Lack of Transportation (Medical):  Lack of Transportation (Non-Medical):    Physical Activity:     Days of Exercise per Week:     Minutes of Exercise per Session:    Stress:     Feeling of Stress :    Social Connections:     Frequency of Communication with Friends and Family:     Frequency of Social Gatherings with Friends and Family:     Attends Hoahaoism Services:     Active Member of Clubs or Organizations:     Attends Club or Organization Meetings:     Marital Status:    Intimate Partner Violence:     Fear of Current or Ex-Partner:     Emotionally Abused:     Physically Abused:     Sexually Abused:      Family History   Problem Relation Age of Onset    Heart Disease Mother     Diabetes Mother     Stroke Father      No Known Allergies  Patient has no known allergies. There were no vitals filed for this visit. Current Outpatient Medications   Medication Sig Dispense Refill    [START ON 7/16/2021] HYDROcodone-acetaminophen (NORCO) 5-325 MG per tablet Take 1 tablet by mouth 2 times daily as needed for Pain for up to 30 days. 60 tablet 0    gabapentin (NEURONTIN) 600 MG tablet Take 1 tablet by mouth 3 times daily for 30 days. 90 tablet 1    hydrocortisone 2.5 % cream Apply topically 2 times daily      magnesium oxide (MAG-OX) 400 MG tablet Take 400 mg by mouth daily      terazosin (HYTRIN) 10 MG capsule TAKE 1 ORAL CAPSULE ONCE A DAY 30 capsule 0    finasteride (PROSCAR) 5 MG tablet Take 1 tablet by mouth daily 30 tablet 0    metoprolol succinate (TOPROL XL) 25 MG extended release tablet Take 1 tablet by mouth daily 30 tablet 0    acetaminophen (TYLENOL) 325 MG tablet Take 650 mg by mouth every 6 hours as needed for Pain      Multiple Vitamin (M.V.I. ADULT IV) Infuse 1 tablet intravenously daily      Ascorbic Acid (VITAMIN C) 250 MG tablet Take 250 mg by mouth daily      Cholecalciferol (VITAMIN D) 50 MCG (2000 UT) CAPS capsule Take 1 capsule by mouth daily      bumetanide (BUMEX) 2 MG tablet Take 1 tablet by mouth daily 30 tablet 3    potassium chloride (KLOR-CON M) 20 MEQ extended release tablet Take 1 tablet by mouth daily 60 tablet 3    aspirin 81 MG EC tablet Take 81 mg by mouth daily      Handicap Placard MISC Handicap Placard valid for 5 years. Expires 2026 1 each 0     No current facility-administered medications for this visit. Review of Systems   Constitutional: Negative. Negative for fever. Respiratory: Negative. Musculoskeletal: Positive for arthralgias and back pain. Neurological: Negative. Objective:  General Appearance:  Uncomfortable and in pain. Vital signs: (most recent): Height 5' 9\" (1.753 m). Vital signs are normal.  No fever. Output: Producing urine and producing stool.     HEENT: Normal HEENT exam.    Lungs:  Normal effort and normal respiratory rate. Breath sounds clear to auscultation. He is not in respiratory distress. Heart: Normal rate. Extremities: Normal range of motion. There is no deformity. Neurological: Patient is alert and oriented to person, place and time. Patient has normal coordination. Pupils:  Pupils are equal, round, and reactive to light. Pupils are equal.   Skin:  Warm and dry. No rash or cyanosis. Assessment & Plan     1. Spinal stenosis of lumbar region with neurogenic claudication    2. Lumbosacral spondylosis without myelopathy    3. Chronic use of opiate drug for therapeutic purpose        No orders of the defined types were placed in this encounter. Orders Placed This Encounter   Medications    HYDROcodone-acetaminophen (NORCO) 5-325 MG per tablet     Sig: Take 1 tablet by mouth 2 times daily as needed for Pain for up to 30 days. Dispense:  60 tablet     Refill:  0     Reduce doses taken as pain becomes manageable    gabapentin (NEURONTIN) 600 MG tablet     Sig: Take 1 tablet by mouth 3 times daily for 30 days. Dispense:  90 tablet     Refill:  1      Controlled Substance Monitoring:    Acute and Chronic Pain Monitoring:   RX Monitoring 7/13/2021   Periodic Controlled Substance Monitoring Possible medication side effects, risk of tolerance/dependence & alternative treatments discussed. ;No signs of potential drug abuse or diversion identified. ;Assessed functional status. ;Obtaining appropriate analgesic effect of treatment. Chronic Pain > 50 MEDD Obtained or confirmed \"Consent for Opioid Use\" on file.        Minimally invasive of lumbar decompression using mild procedure discussed again with patient in the presence of family member son-in-law  Patient wished to continue medication management at this time  Automated prescription report reviewed  Safe use of medication discussed  Risk associated with opioids particularly risk of fall sedation and respiratory depression is high considering advanced age, risk discussed with patient  Patient will call for next month refill  Follow-up in 2 months  Electronically signed by Nico Keith MD on 7/13/2021 at 3:35 PM

## 2021-08-16 DIAGNOSIS — M47.817 LUMBOSACRAL SPONDYLOSIS WITHOUT MYELOPATHY: Chronic | ICD-10-CM

## 2021-08-16 DIAGNOSIS — Z79.891 CHRONIC USE OF OPIATE DRUG FOR THERAPEUTIC PURPOSE: ICD-10-CM

## 2021-08-16 DIAGNOSIS — M48.062 SPINAL STENOSIS OF LUMBAR REGION WITH NEUROGENIC CLAUDICATION: Chronic | ICD-10-CM

## 2021-08-16 RX ORDER — HYDROCODONE BITARTRATE AND ACETAMINOPHEN 5; 325 MG/1; MG/1
1 TABLET ORAL 2 TIMES DAILY PRN
Qty: 60 TABLET | Refills: 0 | Status: SHIPPED | OUTPATIENT
Start: 2021-08-16 | End: 2021-09-13 | Stop reason: SDUPTHER

## 2021-09-13 ENCOUNTER — OFFICE VISIT (OUTPATIENT)
Dept: PAIN MANAGEMENT | Age: 86
End: 2021-09-13
Payer: MEDICARE

## 2021-09-13 VITALS
SYSTOLIC BLOOD PRESSURE: 107 MMHG | HEART RATE: 55 BPM | WEIGHT: 158 LBS | HEIGHT: 69 IN | DIASTOLIC BLOOD PRESSURE: 65 MMHG | OXYGEN SATURATION: 95 % | BODY MASS INDEX: 23.4 KG/M2

## 2021-09-13 DIAGNOSIS — M48.062 SPINAL STENOSIS OF LUMBAR REGION WITH NEUROGENIC CLAUDICATION: Chronic | ICD-10-CM

## 2021-09-13 DIAGNOSIS — G89.29 NECK PAIN, CHRONIC: ICD-10-CM

## 2021-09-13 DIAGNOSIS — M54.2 NECK PAIN, CHRONIC: ICD-10-CM

## 2021-09-13 DIAGNOSIS — M47.817 LUMBOSACRAL SPONDYLOSIS WITHOUT MYELOPATHY: Chronic | ICD-10-CM

## 2021-09-13 DIAGNOSIS — Z79.891 CHRONIC USE OF OPIATE DRUG FOR THERAPEUTIC PURPOSE: ICD-10-CM

## 2021-09-13 PROCEDURE — G8420 CALC BMI NORM PARAMETERS: HCPCS | Performed by: NURSE PRACTITIONER

## 2021-09-13 PROCEDURE — 99213 OFFICE O/P EST LOW 20 MIN: CPT | Performed by: NURSE PRACTITIONER

## 2021-09-13 PROCEDURE — G8427 DOCREV CUR MEDS BY ELIG CLIN: HCPCS | Performed by: NURSE PRACTITIONER

## 2021-09-13 PROCEDURE — 1036F TOBACCO NON-USER: CPT | Performed by: NURSE PRACTITIONER

## 2021-09-13 PROCEDURE — 1123F ACP DISCUSS/DSCN MKR DOCD: CPT | Performed by: NURSE PRACTITIONER

## 2021-09-13 PROCEDURE — 4040F PNEUMOC VAC/ADMIN/RCVD: CPT | Performed by: NURSE PRACTITIONER

## 2021-09-13 RX ORDER — HYDROCODONE BITARTRATE AND ACETAMINOPHEN 5; 325 MG/1; MG/1
1 TABLET ORAL 2 TIMES DAILY PRN
Qty: 60 TABLET | Refills: 0 | Status: SHIPPED | OUTPATIENT
Start: 2021-09-15 | End: 2021-10-22 | Stop reason: SDUPTHER

## 2021-09-13 ASSESSMENT — ENCOUNTER SYMPTOMS
RESPIRATORY NEGATIVE: 1
BACK PAIN: 1
COUGH: 0
CONSTIPATION: 0
SHORTNESS OF BREATH: 0

## 2021-09-13 NOTE — PROGRESS NOTES
Patient is here today to review medication contract. Chief Complaint   Patient presents with    Back Pain    Medication Refill       PMH     49-year-old man with chronic back pain radiation down both legs aggravated with standing and walking   No loss of bladder or bowel control  Diagnosed with severe lumbar spinal stenosis and Dr Bruce Paul suggested minimally invasive lumbar decompression using mild procedure but patient would like to wait. He has tried epidural injection, medication and physical therapy with minimal relief  Currently take Norco BID prn along with gabapentin with minimal relief  Reports no side effect from the medication    Main complaint today is Right cervical neck pain with ROM denies n/t. Reports pain as chronic and constant with no new injury to the area. Denies PT injections or surgery to the area. Discussed starting PT and XR with  MRI possibly in future further evaluate pathology and guide treatment plan       HPI:   Back Pain  This is a chronic problem. The current episode started more than 1 year ago. The problem occurs constantly. The problem is unchanged. The pain is present in the lumbar spine. The quality of the pain is described as aching and shooting. The pain radiates to the right foot and left foot. The pain is at a severity of 7/10. The pain is moderate. The pain is the same all the time. The symptoms are aggravated by bending, position and standing. Associated symptoms include numbness (legs feet), paresthesias and weakness. Pertinent negatives include no chest pain or fever. He has tried analgesics for the symptoms. The treatment provided mild relief. Neck Pain   This is a chronic problem. The current episode started more than 1 year ago. The problem occurs constantly. The problem has been gradually worsening. The pain is associated with nothing. The pain is present in the midline and right side. The quality of the pain is described as stabbing and aching.  The pain is at a severity of 7/10. The pain is moderate. The symptoms are aggravated by position and twisting. Associated symptoms include numbness (legs feet) and weakness. Pertinent negatives include no chest pain or fever. He has tried acetaminophen, home exercises and oral narcotics for the symptoms. The treatment provided mild relief. Chief Complaint:  Medication Refill: Norco    Pain score Today:  7  Adverse effects (Constipation / Nausea / Sedation / sexual Dysfunction / others) : na  Mood: good  Sleep pattern and quality: fair  Activity level: fair    Pill count Today: due 9/15. Pt forgot to bring   Last dose taken  N/A  OARRS report reviewed today: yes  Morphine equivalent: 10  ER/Hospitalizations/PCP visit related to pain since last visit:yes   Any legal problems e.g. DUI etc.:No  Satisfied with current management: Yes    Opioid Contract:9/18/20  Last Urine Dug screen dated:9/18/20      Past Medical History, Past Surgical History, Social History, Allergies and Medications reviewed and updated in EPIC as indicated    Family History reviewed and is noncontributory. Controlled Substance Monitoring:    Acute and Chronic Pain Monitoring:   RX Monitoring 9/13/2021   Periodic Controlled Substance Monitoring Possible medication side effects, risk of tolerance/dependence & alternative treatments discussed. ;No signs of potential drug abuse or diversion identified. ;Assessed functional status. ;Obtaining appropriate analgesic effect of treatment. ;Random urine drug screen sent today. Chronic Pain > 50 MEDD -             Periodic Controlled Substance Monitoring: Possible medication side effects, risk of tolerance/dependence & alternative treatments discussed., No signs of potential drug abuse or diversion identified. , Assessed functional status., Obtaining appropriate analgesic effect of treatment. , Random urine drug screen sent today.  KHALIF Palomares - CNP)      Past Medical History:   Diagnosis Date    Arrhythmia     BPH with obstruction/lower urinary tract symptoms     Caffeine use     3 coffee/day    Cardiac murmur     Cataracts, bilateral     Chronic back pain     Constipation     Enlarged prostate     Glaucoma     Hypertension     Prostatitis        Past Surgical History:   Procedure Laterality Date    EYE SURGERY      HERNIA REPAIR  2006    PAIN MANAGEMENT PROCEDURE Bilateral 11/12/2020    BILATERAL LUMBAR FACET STEROID INJECTION    L3-S1 performed by Earlene Irizarry MD at [address] [facility] Bilateral 11/23/2020    BILATERAL LUMBAR FACET STEROID INJECTION L3-S1 performed by Earlene Irizarry MD at [address] [place] [address] Bilateral 4/26/2021    RIGHT L3 AND LEFT L4 EPIDURAL STEROID INJECTION performed by Earlene Irizarry MD at [address] [facility]  07/20/2011    TONSILLECTOMY AND ADENOIDECTOMY         No Known Allergies      Current Outpatient Medications:     [START ON 9/15/2021] HYDROcodone-acetaminophen (NORCO) 5-325 MG per tablet, Take 1 tablet by mouth 2 times daily as needed for Pain for up to 30 days. , Disp: 60 tablet, Rfl: 0    ibuprofen (ADVIL;MOTRIN) 200 MG tablet, Take 200 mg by mouth every 6 hours as needed for Pain, Disp: , Rfl:     terazosin (HYTRIN) 10 MG capsule, TAKE ONE CAPSULE BY MOUTH DAILY, Disp: 90 capsule, Rfl: 0    metoprolol succinate (TOPROL XL) 25 MG extended release tablet, TAKE ONE TABLET BY MOUTH DAILY, Disp: 90 tablet, Rfl: 0    finasteride (PROSCAR) 5 MG tablet, Take 1 tablet by mouth daily, Disp: 30 tablet, Rfl: 0    acetaminophen (TYLENOL) 325 MG tablet, Take 650 mg by mouth every 6 hours as needed for Pain, Disp: , Rfl:     Multiple Vitamin (M.V.I.  ADULT IV), Infuse 1 tablet intravenously daily, Disp: , Rfl:     Ascorbic Acid (VITAMIN C) 250 MG tablet, Take 250 mg by mouth daily, Disp: , Rfl:     Cholecalciferol (VITAMIN D) 50 MCG (2000 UT) CAPS capsule, Take 1 capsule by mouth daily, Disp: , Rfl:     Handicap Susan FISCHERC, Handicap Placard valid for 5 years. Expires 2026, Disp: 1 each, Rfl: 0    Family History   Problem Relation Age of Onset    Heart Disease Mother     Diabetes Mother     Stroke Father        Social History     Socioeconomic History    Marital status:      Spouse name: Not on file    Number of children: Not on file    Years of education: Not on file    Highest education level: Not on file   Occupational History    Not on file   Tobacco Use    Smoking status: Former Smoker    Smokeless tobacco: Never Used    Tobacco comment: quit 35 years ago    Substance and Sexual Activity    Alcohol use: Yes     Alcohol/week: 1.0 standard drinks     Types: 1 Glasses of wine per week    Drug use: No    Sexual activity: Not on file   Other Topics Concern    Not on file   Social History Narrative    Not on file     Social Determinants of Health     Financial Resource Strain: Low Risk     Difficulty of Paying Living Expenses: Not hard at all   Food Insecurity: No Food Insecurity    Worried About Running Out of Food in the Last Year: Never true    [address] in the Last Year: Never true   Transportation Needs:     Lack of Transportation (Medical):  Lack of Transportation (Non-Medical):    Physical Activity:     Days of Exercise per Week:     Minutes of Exercise per Session:    Stress:     Feeling of Stress :    Social Connections:     Frequency of Communication with Friends and Family:     Frequency of Social Gatherings with Friends and Family:     Attends [employer] Services:     Active Member of Clubs or Organizations:     Attends Club or Organization Meetings:     Marital Status:    Intimate Partner Violence:     Fear of Current or Ex-Partner:     Emotionally Abused:     Physically Abused:     Sexually Abused:        Review of Systems:  Review of Systems   Constitutional: Negative. Negative for chills and fever. Cardiovascular: Negative. Negative for chest pain. Respiratory: Negative. Negative for cough and shortness of breath. Musculoskeletal: Positive for back pain, neck pain and stiffness. Gastrointestinal: Negative for constipation. Neurological: Positive for numbness (legs feet), paresthesias and weakness. Physical Exam:  /65   Pulse 55   Ht 5' 9\" (1.753 m)   Wt 158 lb (71.7 kg)   SpO2 95%   BMI 23.33 kg/m²     Physical Exam  Cardiovascular:      Rate and Rhythm: Normal rate. Pulmonary:      Effort: Pulmonary effort is normal.   Musculoskeletal:      Cervical back: Pain with movement present. Decreased range of motion. Lumbar back: Decreased range of motion. Comments: Stooped posture - using walker   Skin:     General: Skin is warm and dry. Neurological:      Mental Status: He is alert and oriented to person, place, and time. 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)    Relevant Medications    HYDROcodone-acetaminophen ([address]) 5-325 MG per tablet (Start on 9/15/2021)    Lumbosacral spondylosis without myelopathy (Chronic)    Relevant Medications    HYDROcodone-acetaminophen (NORCO) 5-325 MG per tablet (Start on 9/15/2021)    Chronic use of opiate drug for therapeutic purpose    Relevant Medications    HYDROcodone-acetaminophen (NORCO) 5-325 MG per tablet (Start on 9/15/2021)    Neck pain, chronic    Relevant Medications    HYDROcodone-acetaminophen (NORCO) 5-325 MG per tablet (Start on 9/15/2021)    Other Relevant Orders    Ambulatory referral to Physical Therapy    XR CERVICAL SPINE FLEXION AND EXTENSION             Treatment Plan:  Patient relates current medications are helping the pain. Patient reports taking pain medications as prescribed, denies obtaining medications from different sources and denies use of illegal drugs. Patient denies side effects from medications like nausea, vomiting, constipation or drowsiness.  Patient reports current activities of daily living are possible due to medications and would like to continue them. As always, we encourage daily stretching and strengthening exercises, and recommend minimizing use of pain medications unless patient cannot get through daily activities due to pain. Contract requirements met. Continue opioid therapy.  Script written for norco  PT and XR for c/o neck pain  TODD next visit   Follow up appointment made for 8 weeks

## 2021-09-21 ENCOUNTER — HOSPITAL ENCOUNTER (OUTPATIENT)
Dept: PHYSICAL THERAPY | Facility: CLINIC | Age: 86
Setting detail: THERAPIES SERIES
Discharge: HOME | End: 2021-09-21
Payer: MEDICARE

## 2021-09-21 PROCEDURE — 97161 PT EVAL LOW COMPLEX 20 MIN: CPT

## 2021-09-21 PROCEDURE — 97110 THERAPEUTIC EXERCISES: CPT

## 2021-09-21 NOTE — CONSULTS
Jay Fall Risk Assessment    Patient Name:  Farooq Velázquez  : 1929        Risk Factor Scale  Score   History of Falls [] Yes  [x] No 25  0 0   Secondary Diagnosis [x] Yes  [] No 15  0 15   Ambulatory Aid [] Furniture  [x] Crutches/cane/walker  [] None/bedrest/wheelchair/nurse 30  15  0 15   IV/Heparin Lock [] Yes  [x] No 20  0 0   Gait/Transferring [] Impaired  [x] Weak  [] Normal/bedrest/immobile 20  10  0 10   Mental Status [] Forgets limitations  [x] Oriented to own ability 15  0 0      Total: 40     Based on the Assessment score: check the appropriate box.     []  No intervention needed   Low =   Score of 0-24    [x]  Use standard prevention interventions Moderate =  Score of 24-44   [x] Give patient handout and discuss fall prevention strategies   [x] Establish goal of education for patient/family RE: fall prevention strategies    []  Use high risk prevention interventions High = Score of 45 and higher   [] Give patient handout and discuss fall prevention strategies   [] Establish goal of education for patient/family Re: fall prevention strategies   [] Discuss lifeline / other resources    Electronically signed by:   Amy Lebron PT  Date: 2021

## 2021-09-21 NOTE — CONSULTS
[] [facility]) - [facility]  Therapy  [address]  [phone]  F: [phone] [x] [address]  [facility]   [address]  P: [phone]  F: [phone] [] [address] &  Therapy  [address] [facility]  P: [phone]  F: [phone] [] [address]  P: [phone]  F: [phone] [] [address]  [facility]   [address]   [place]: [phone]  F: [phone]      Physical Therapy Spine Evaluation    Date:  2021  Patient: Bharati Jackman  : 1929  MRN: [id number]  Physician: JERMAN Rivera  Insurance: [address] [place] [address]  Medical Diagnosis: Neck pain, chronic (M54.2,G89.29 [ICD-10-CM])  Rehab Codes: M54.2, M62.838  Onset Date: chronic; worse since   Next 's appt.: 21    Subjective:   CC/HPI: (onset date): Pt is a 80 y.o. male who reports a history of chronic neck and low back pain. He reports he is here for his neck pain. He ambulates with a rolling walker. His cc is right cervical pain. He states the pain is constant with varying intensity. Current pain rating 8/10. Aggravating factors Include turning his head. He denies any numbness/tingling in either upper extremity.      PMHx: [] Unremarkable [] Diabetes [x] HTN  [] Pacemaker   [] MI/Heart Problems [] Cancer [] Arthritis [x] Other:Arrhythmia; BPH; chronic back pain;  Left TSA              [x] Refer to full medical chart  In EPIC       Comorbidities:   [] Obesity [] Dialysis  [] N/A   [] Asthma/COPD [] Dementia [] Other:   [] Stroke [] Sleep apnea [] Other:   [] Vascular disease [] Rheumatic disease [] Other:     Tests: [x] X-Ray:pending [] MRI:  [] Other:    Medications: [x] Refer to full medical record [] None [] Other:  Allergies:      [x] Refer to full medical record [] None [] Other:    Function:  Hand Dominance  [] Right  [] Left  Patient lives with: apartment   In what type of home [x]  One story   [] Two story   [] Split level   Number of stairs to enter 0   With handrail on the []  Right to enter   [] Left to enter   Bathroom has a []  Tub only  [] Tub/shower combo   [x] Walk in shower    []  Grab bars   Washing machine is on []  Main level   [] Second level   [] Basement   Employer    Job Status []  Normal duty   [] Light duty   [] Off due to condition    [x]  Retired   [] Not employed   [] Disability  [] Other:  []  Return to work: Work activities/duties n/a       ADL/IADL Previous level of function Current level of function Who currently assists the patient with task   Bathing  [x] Independent  [] Assist [x] Independent  [] Assist    Dress/grooming [x] Independent  [] Assist [x] Independent  [] Assist    Transfer/mobility [] Independent  [x] Assist [] Independent  [x] Assist Rolling walker   Feeding [x] Independent  [] Assist [x] Independent  [] Assist    Toileting [] Independent  [] Assist [] Independent  [] Assist    Driving [] Independent  [x] Assist [] Independent  [x] Assist Not driving   Housekeeping [] Independent  [x] Assist [] Independent  [x] Assist    Grocery shop/meal prep [] Independent  [x] Assist [] Independent  [x] Assist Daughter/son in law     Gait Prior level of function Current level of function    [] Independent  [x] Assist [] Independent  [x] Assist   Device: [] Independent [] Independent    [] Straight Cane [] Quad cane [] Straight Cane [] Quad cane    [] Standard walker [x] Rolling walker   [] 4 wheeled walker [] Standard walker [x] Rolling walker   [] 4 wheeled walker    [] Wheelchair [] Wheelchair        pain  yes   location Right cervical   current: 0-10  8/10   pain at worst  10/10   pain type  sharp   what makes pain worse  turning head   what makes pain better  not moving   better/worse/same     disturbed sleep?   yes Objective:      STRENGTH  ROM    Left Right Cervical    C5 Shld Abd 4 4 Flexion Mod ? Shld Flexion 4 4 Extension Darryl ? Shld IR   Rotation L 55 R 25   Shld ER   Sidebend L 15 R 15*   C6 Elb Flex 4 4 Retraction NT   C7 Elb Ext 4 4 Lumbar NT   C8 EPL   Flexion    T1 Fing Abd   Extension       Rotation L  R      Sidebend L R      UE/LE       TESTS (+/-) LEFT RIGHT Not Tested   Cerv. Comp   [x]   Cerv. Distraction   [x]   Cerv. Alar/Transverse   [x]   Vertebral Artery   [x]   Adsons   [x]   Velta Raulito   [x]       OBSERVATION No Deficit Deficit Not Tested Comments   Posture       Forward Head [] [x] []    Rounded Shoulders [] [x] []    Kyphosis [] [x] []    Lordosis [] [] []    Lateral Shift [] [] []    Leg Length Discrp [] [] []    Slumped Sitting [] [x] []    Palpation [] [x] [] Right upper trap/levator scapula   Sensation [] [] []    Edema [] [] []    Neurological [] [] [x]        Functional Test: Neck  Disability Index Score: 11/40     Comments: Bilateral shoulder elevation to @90°; R cervical pain reproduced with shoulder shrugs/rolls; no pain with scapular retraction    Assessment:     Pt would benefit from skilled PT interventions to decrease pain, increase strength, ROM, and overall functional mobility for improved quality of life. Problems:    [x] ? Pain: R lower cervical/upper trap pain- variable intensity-8-10/10  [x] ? ROM: limited R  cervical rotation-25°  [x] ? Function: movements of cervical spine- limited and painful    STG: (to be met in 7 treatments)  1. ? Pain: reduce R cervical/UT pain to 5/10 with ADL's  2. ? ROM: improve R cervical rotation 10° (to 35°) to assist with ADL's  3. ? Function: improve Oswestry score for reading; improve postural awareness  4. Patient to be independent with home exercise program as demonstrated by performance with correct form without cues. 5. Demonstrate Knowledge of fall prevention  LTG: (to be met in 12 treatments)  1.  Improve Oswestry score for sleeping 2. Improve ACROM- R rotation to 45° to assist with ADL's    Patient goals: stop pain    Rehab Potential:  [] Good  [x] Fair  [] Poor   Suggested Professional Referral:  [x] No  [] Yes:  Barriers to Goal Achievement:  [x] No  [] Yes:  Domestic Concerns:  [x] No  [] Yes:    Pt. Education:  [] Plans/Goals, Risks/Benefits discussed  [] Home exercise program  Method of Education: [] Verbal  [] Demo  [x] Written:   Access Code: [email or web address]  URL: [email or web address]  Date: 09/21/2021  Prepared by: Génesis Crystal, PT    Exercises  Seated Cervical Rotation AROM - 1 x daily - 7 x weekly - 1 sets - 10 reps  Seated Cervical Sidebending AROM - 1 x daily - 7 x weekly - 1 sets - 10 reps  Seated Shoulder Shrugs - 2 x daily - 7 x weekly - 1 sets - 10 reps  Seated Scapular Retraction - 2 x daily - 7 x weekly - 1 sets - 10 reps    Comprehension of Education:  [x] Verbalizes understanding. [] Demonstrates understanding. [] Needs Review. [] Demonstrates/verbalizes understanding of HEP/Ed previously given.     Treatment Plan:  [x] Therapeutic Exercise   44924  [] Iontophoresis: 4 mg/mL Dexamethasone Sodium Phosphate  mAmin  04811   [] Therapeutic Activity  68999 [] Vasopneumatic cold with compression  71868    [] Gait Training   54348 [] Ultrasound   67715   [] Neuromuscular Re-education  51744 [] Electrical Stimulation Unattended  94447   [] Manual Therapy  48635 [] Electrical Stimulation Attended  53967   [x] Instruction in HEP  [] Lumbar/Cervical Traction  68641   [] Aquatic Therapy   77604 [x] Cold/hotpack    [] Massage   40546      [] Dry Needling, 1 or 2 muscles  42751   [] Biofeedback, first 15 minutes   18638  [] Biofeedback, additional 15 minutes   46111 [] Dry Needling, 3 or more muscles  94306       Frequency:  2 x/week for 12 visits    Todays Treatment:  Modalities: HP to R UT x10   Min in sitting)- use extra towel  Precautions:  Exercises:  Exercise Reps/ Time Weight/ Level Comments         sitting ACROM- SB x10ea     rotation x10ea     shld shrugs x10     scap retraction x10                             Other:    Specific Instructions for next treatment: review HEP; gentle AROM, light stretching;       Evaluation Complexity:  History (Personal factors, comorbidities) [] 0 [x] 1-2 [] 3+   Exam (limitations, restrictions) [x] 1-2 [] 3 [] 4+   Clinical presentation (progression) [x] Stable [] Evolving  [] Unstable   Decision Making [x] Low [] Moderate [] High    [x] Low Complexity [] Moderate Complexity [] High Complexity       Treatment Charges: Mins Units   [x] Evaluation       [x]  Low       []  Moderate       []  High 43 1   []  Modalities     [x]  Ther Exercise 10 1   []  Manual Therapy     []  Ther Activities     []  Aquatics     []  Vasocompression     []  Other      [employer] total used to date: $ 120.85  TOTAL TREATMENT TIME: 53min    Time in: 4pm     Time out: 4:58p    Electronically signed by: Robert Moeller PT        Physician Signature:________________________________Date:__________________  By signing above or cosigning this note, I have reviewed this plan of care and certify a need for medically necessary rehabilitation services.      *PLEASE SIGN ABOVE AND FAX BACK ALL PAGES*

## 2021-09-22 PROBLEM — N40.1 BPH WITH OBSTRUCTION/LOWER URINARY TRACT SYMPTOMS: Status: ACTIVE | Noted: 2021-09-22

## 2021-09-22 PROBLEM — N13.8 BPH WITH OBSTRUCTION/LOWER URINARY TRACT SYMPTOMS: Status: ACTIVE | Noted: 2021-09-22

## 2021-09-22 PROBLEM — R35.0 FREQUENCY OF MICTURITION: Status: ACTIVE | Noted: 2021-09-22

## 2021-09-22 PROBLEM — R35.1 NOCTURIA: Status: ACTIVE | Noted: 2021-09-22

## 2021-09-28 ENCOUNTER — HOSPITAL ENCOUNTER (OUTPATIENT)
Facility: CLINIC | Age: 86
Discharge: HOME | End: 2021-09-30
Payer: MEDICARE

## 2021-09-28 ENCOUNTER — HOSPITAL ENCOUNTER (OUTPATIENT)
Dept: GENERAL RADIOLOGY | Facility: CLINIC | Age: 86
Discharge: HOME | End: 2021-09-30
Payer: MEDICARE

## 2021-09-28 DIAGNOSIS — G89.29 NECK PAIN, CHRONIC: ICD-10-CM

## 2021-09-28 DIAGNOSIS — M54.2 NECK PAIN, CHRONIC: ICD-10-CM

## 2021-09-28 PROCEDURE — 72040 X-RAY EXAM NECK SPINE 2-3 VW: CPT

## 2021-10-06 ENCOUNTER — HOSPITAL ENCOUNTER (OUTPATIENT)
Dept: PHYSICAL THERAPY | Facility: CLINIC | Age: 86
Setting detail: THERAPIES SERIES
Discharge: HOME | End: 2021-10-06
Payer: MEDICARE

## 2021-10-06 PROCEDURE — 97110 THERAPEUTIC EXERCISES: CPT

## 2021-10-06 NOTE — FLOWSHEET NOTE
Charges: Mins Units   [x]  Modalities HP 10 0   [x]  Ther Exercise 30 2   []  Manual Therapy     []  Ther Activities     []  Aquatics     []  Vasocompression     []  Other     Total Treatment time 30 2   [employer] total used to date: $ 143.69      Assessment: [x] Progressing toward goals. Continued with exercises from the evaluation. Able to add cervical retraction, shoulder circles and scapular strengthening as charted. Pt able to complete these exercises with mild complaints of pain with most exercises. [place] band given and HEP updated. [] No change. [] Other:  [x] Patient would continue to benefit from skilled physical therapy services in order to: decrease pain, increase strength, ROM, and overall functional mobility for improved quality of life    Problems:    [x]? ? Pain: R lower cervical/upper trap pain- variable intensity-8-10/10  [x]? ? ROM: limited R  cervical rotation-25°  [x]? ? Function: movements of cervical spine- limited and painful     STG: (to be met in 7 treatments)  1. ? Pain: reduce R cervical/UT pain to 5/10 with ADL's  2. ? ROM: improve R cervical rotation 10° (to 35°) to assist with ADL's  3. ? Function: improve Oswestry score for reading; improve postural awareness  4. Patient to be independent with home exercise program as demonstrated by performance with correct form without cues. 5. Demonstrate Knowledge of fall prevention  LTG: (to be met in 12 treatments)  1. Improve Oswestry score for sleeping   2. Improve ACROM- R rotation to 45° to assist with ADL's     Patient goals: stop pain    Pt. Education:  [x] Yes  [] No  [x] Reviewed Prior HEP/Ed  Method of Education: [x] Verbal  [x] Demo  [x] Written    Access Code: [email or web address]  URL: [email or web address] [email or web address]/  Date: 10/06/2021  Prepared by:  Faraz Hemp    Exercises  Seated Cervical Rotation AROM - 2 x daily - 7 x weekly - 1 sets - 10 reps  Seated Cervical Sidebending AROM - 2 x daily - 7 x weekly - 1 sets - 10 reps  Seated Shoulder Shrugs - 2 x daily - 7 x weekly - 1 sets - 10 reps  Seated Cervical Retraction - 2 x daily - 7 x weekly - 1 sets - 20 reps - no hold  Seated Scapular Retraction - 2 x daily - 7 x weekly - 1 sets - 10 reps  Seated Shoulder Shrug Circles AROM Backward - 2 x daily - 7 x weekly - 1 sets - 20 reps - no hold  Seated Shoulder Shrug Circles AROM Forward - 2 x daily - 7 x weekly - 1 sets - 20 reps - no hold  Shoulder Extension with Resistance - 2 x daily - 7 x weekly - 1 sets - 20 reps - no hold  Scapular Retraction with Resistance - 2 x daily - 7 x weekly - 1 sets - 20 reps - no hold  Shoulder External Rotation and Scapular Retraction with Resistance - 2 x daily - 7 x weekly - 1 sets - 20 reps - no hold    Comprehension of Education:  [x] Verbalizes understanding. [x] Demonstrates understanding. [x] Needs review. [x] Demonstrates/verbalizes HEP/Ed previously given. Plan: [x] Continue current frequency toward long and short term goals.     [x] Specific Instructions for subsequent treatments: review HEP; gentle AROM, light stretching;       Time In:2:00            Time Out: 2:45    Electronically signed by:  Kelly Madrid PTA

## 2021-10-18 DIAGNOSIS — M48.062 SPINAL STENOSIS OF LUMBAR REGION WITH NEUROGENIC CLAUDICATION: Chronic | ICD-10-CM

## 2021-10-18 DIAGNOSIS — M47.817 LUMBOSACRAL SPONDYLOSIS WITHOUT MYELOPATHY: Chronic | ICD-10-CM

## 2021-10-18 DIAGNOSIS — Z79.891 CHRONIC USE OF OPIATE DRUG FOR THERAPEUTIC PURPOSE: ICD-10-CM

## 2021-10-18 RX ORDER — HYDROCODONE BITARTRATE AND ACETAMINOPHEN 5; 325 MG/1; MG/1
1 TABLET ORAL 2 TIMES DAILY PRN
Qty: 60 TABLET | Refills: 0 | Status: CANCELLED | OUTPATIENT
Start: 2021-10-18 | End: 2021-11-17

## 2021-10-18 NOTE — TELEPHONE ENCOUNTER
Last visit: 9/13/21    Last Med refill: 9/13/21  Does patient have enough medication for 72 hours:     Next Visit Date:  Future Appointments   Date Time Provider Jace De La Torre   11/8/2021  1:20 PM KHALIF Juan - CNP [facility] [address] Maintenance   Topic Date Due    Shingles Vaccine (1 of 2) Never done    DTaP/Tdap/Td vaccine (1 - Tdap) 01/02/2012    Annual Wellness Visit (AWV)  Never done    Pneumococcal 65+ years Vaccine (2 of 2 - PPSV23) 06/09/2020    Flu vaccine (1) 09/01/2021    COVID-19 Vaccine  Completed    Hepatitis A vaccine  Aged Out    Hepatitis B vaccine  Aged Out    Hib vaccine  Aged Out    Meningococcal (ACWY) vaccine  Aged Out       Hemoglobin A1C (%)   Date Value   07/23/2021 5.9   01/05/2021 5.7   08/28/2020 6.0             ( goal A1C is < 7)   No results found for: LABMICR  LDL Cholesterol (mg/dL)   Date Value   08/28/2020 107     LDL Calculated (mg/dL)   Date Value   07/23/2021 115   01/05/2021 89       (goal LDL is <100)   AST (U/L)   Date Value   07/23/2021 25     ALT (U/L)   Date Value   07/23/2021 46 (H)     BUN (mg/dL)   Date Value   07/23/2021 21 (H)     BP Readings from Last 3 Encounters:   09/13/21 107/65   08/18/21 (!) 143/71   07/29/21 120/68          (goal 120/80)    All Future Testing planned in [facility]  Lab Frequency Next Occurrence               Patient Active Problem List:     Edema     Low back pain     Asthmatic bronchitis without complication     Spinal stenosis of lumbar region with neurogenic claudication     Lumbosacral spondylosis without myelopathy     Abnormal MRI, lumbar spine     Chronic use of opiate drug for therapeutic purpose     Neck pain, chronic     BPH with obstruction/lower urinary tract symptoms     Nocturia     Frequency of micturition

## 2021-10-22 DIAGNOSIS — Z79.891 CHRONIC USE OF OPIATE DRUG FOR THERAPEUTIC PURPOSE: ICD-10-CM

## 2021-10-22 DIAGNOSIS — M47.817 LUMBOSACRAL SPONDYLOSIS WITHOUT MYELOPATHY: Chronic | ICD-10-CM

## 2021-10-22 DIAGNOSIS — M48.062 SPINAL STENOSIS OF LUMBAR REGION WITH NEUROGENIC CLAUDICATION: Chronic | ICD-10-CM

## 2021-10-22 RX ORDER — HYDROCODONE BITARTRATE AND ACETAMINOPHEN 5; 325 MG/1; MG/1
1 TABLET ORAL 2 TIMES DAILY PRN
Qty: 60 TABLET | Refills: 0 | Status: SHIPPED | OUTPATIENT
Start: 2021-10-22 | End: 2021-11-08 | Stop reason: SDUPTHER

## 2021-10-22 NOTE — TELEPHONE ENCOUNTER
Delma Banks is requesting a refill on the following medications:   Requested Prescriptions     Pending Prescriptions Disp Refills    HYDROcodone-acetaminophen (NORCO) 5-325 MG per tablet 60 tablet 0     Sig: Take 1 tablet by mouth 2 times daily as needed for Pain for up to 30 days. Last OV 9/13/21    Future Appointments   Date Time Provider Jace De La Torre   11/8/2021  1:20 PM KHALIF Sánchez - CNP [facility]       [facility] report sent to Dr. Jonathan Eckert through alternative route for review.

## 2021-11-08 ENCOUNTER — OFFICE VISIT (OUTPATIENT)
Dept: PAIN MANAGEMENT | Age: 86
End: 2021-11-08
Payer: MEDICARE

## 2021-11-08 VITALS
SYSTOLIC BLOOD PRESSURE: 102 MMHG | HEIGHT: 69 IN | WEIGHT: 155 LBS | DIASTOLIC BLOOD PRESSURE: 58 MMHG | BODY MASS INDEX: 22.96 KG/M2

## 2021-11-08 DIAGNOSIS — Z79.891 CHRONIC USE OF OPIATE DRUG FOR THERAPEUTIC PURPOSE: ICD-10-CM

## 2021-11-08 DIAGNOSIS — M47.817 LUMBOSACRAL SPONDYLOSIS WITHOUT MYELOPATHY: Chronic | ICD-10-CM

## 2021-11-08 DIAGNOSIS — M48.062 SPINAL STENOSIS OF LUMBAR REGION WITH NEUROGENIC CLAUDICATION: Chronic | ICD-10-CM

## 2021-11-08 PROCEDURE — 1036F TOBACCO NON-USER: CPT | Performed by: NURSE PRACTITIONER

## 2021-11-08 PROCEDURE — G8427 DOCREV CUR MEDS BY ELIG CLIN: HCPCS | Performed by: NURSE PRACTITIONER

## 2021-11-08 PROCEDURE — 1123F ACP DISCUSS/DSCN MKR DOCD: CPT | Performed by: NURSE PRACTITIONER

## 2021-11-08 PROCEDURE — 99213 OFFICE O/P EST LOW 20 MIN: CPT | Performed by: NURSE PRACTITIONER

## 2021-11-08 PROCEDURE — G8420 CALC BMI NORM PARAMETERS: HCPCS | Performed by: NURSE PRACTITIONER

## 2021-11-08 PROCEDURE — G8484 FLU IMMUNIZE NO ADMIN: HCPCS | Performed by: NURSE PRACTITIONER

## 2021-11-08 PROCEDURE — 4040F PNEUMOC VAC/ADMIN/RCVD: CPT | Performed by: NURSE PRACTITIONER

## 2021-11-08 RX ORDER — GABAPENTIN 600 MG/1
600 TABLET ORAL 3 TIMES DAILY
Qty: 90 TABLET | Refills: 1 | Status: SHIPPED | OUTPATIENT
Start: 2021-11-08 | End: 2022-01-14 | Stop reason: SDUPTHER

## 2021-11-08 RX ORDER — HYDROCODONE BITARTRATE AND ACETAMINOPHEN 5; 325 MG/1; MG/1
1 TABLET ORAL 2 TIMES DAILY PRN
Qty: 60 TABLET | Refills: 0 | Status: SHIPPED | OUTPATIENT
Start: 2021-11-21 | End: 2022-01-14 | Stop reason: SDUPTHER

## 2021-11-08 ASSESSMENT — ENCOUNTER SYMPTOMS
CONSTIPATION: 0
SHORTNESS OF BREATH: 0
BACK PAIN: 1
COUGH: 0

## 2021-11-08 NOTE — PROGRESS NOTES
Patient is here today to review medication contract. [facility]     80-year-old man here in office with his son in law  C/o with chronic back pain with radiation down both legs aggravated with standing and walking. Does have gabapentin prescribed  TID but has not filled since  Sept. Discussed with pt and family member the proper dosing for efficacy and he will try to take 2-3 times daily. Diagnosed with severe lumbar spinal stenosis and Dr Rosette Vincent suggested minimally invasive lumbar decompression  but patient would like to wait. He has tried epidural injection, medication and physical therapy with minimal relief  No loss of bladder or bowel control  Currently take Norco BID prn along with gabapentin with minimal relief  Reports no side effect from the medication     Main complaint today is Right cervical neck pain with ROM. denies n/t or arm weakness. Reports pain as chronic and constant with no new injury to the area. Denies injections or surgery to the area. Started PT and completed only 2 visits before getting rt toe fx after stubbing toe and now in a shoe cast. Plans to call and restart PT  Cervical XR results reviewed    HPI:   Back Pain  This is a chronic problem. The current episode started more than 1 year ago. The problem occurs constantly. The problem is unchanged. The pain is present in the lumbar spine. The quality of the pain is described as aching. The pain radiates to the left foot and right foot. The pain is at a severity of 8/10. The pain is moderate. The pain is the same all the time. The symptoms are aggravated by standing (walking). Associated symptoms include numbness, paresthesias and weakness. Pertinent negatives include no chest pain or fever. Risk factors include poor posture. The treatment provided mild relief. Neck Pain   This is a chronic problem. The current episode started more than 1 year ago. The problem occurs constantly. The pain is associated with nothing.  The pain is present in the right side and midline. The quality of the pain is described as aching and burning. The pain is at a severity of 8/10. The pain is moderate. Associated symptoms include numbness and weakness. Pertinent negatives include no chest pain or fever. He has tried oral narcotics for the symptoms. The treatment provided mild relief. Chief Complaint: back pain   Medication Refill: Norco 5-325mg     Pain score Today:  8  Adverse effects (Constipation / Nausea / Sedation / sexual Dysfunction / others) : none   Mood: fair  Sleep pattern and quality: fair  Activity level: poor    Pill count Today: did not bring due 11/21 Pt instructed to bring pills to appt in order to document compliance, verbalized understanding    Last dose taken  11/08/2021  OARRS report reviewed today: yes  Morphine equivalent: 10  ER/Hospitalizations/PCP visit related to pain since last visit:no   Any legal problems e.g. DUI etc.:No  Satisfied with current management: Yes    Opioid Contract: none on file   Last Urine Dug screen dated: 9/18/2020    Lab Results   Component Value Date    LABA1C 5.9 07/23/2021     Lab Results   Component Value Date     08/28/2020       Past Medical History, Past Surgical History, Social History, Allergies and Medications reviewed and updated in EPIC as indicated    Family History reviewed and is noncontributory. Controlled Substance Monitoring:    Acute and Chronic Pain Monitoring:   RX Monitoring 11/8/2021   Periodic Controlled Substance Monitoring Possible medication side effects, risk of tolerance/dependence & alternative treatments discussed. ;No signs of potential drug abuse or diversion identified. ;Assessed functional status. ;Obtaining appropriate analgesic effect of treatment.    Chronic Pain > 50 MEDD -             Periodic Controlled Substance Monitoring: Possible medication side effects, risk of tolerance/dependence & alternative treatments discussed., No signs of potential drug abuse or diversion identified. , Assessed functional status., Obtaining appropriate analgesic effect of treatment. Jason Gibson, APRN - CNP)      Past Medical History:   Diagnosis Date    Arrhythmia     BPH with obstruction/lower urinary tract symptoms     Caffeine use     3 coffee/day    Cardiac murmur     Cataracts, bilateral     Chronic back pain     Constipation     Enlarged prostate     Glaucoma     Hypertension     Prostatitis        Past Surgical History:   Procedure Laterality Date    EYE SURGERY      HERNIA REPAIR  2006    PAIN MANAGEMENT PROCEDURE Bilateral 11/12/2020    BILATERAL LUMBAR FACET STEROID INJECTION    L3-S1 performed by Teresa Garcia MD at [address] Bilateral 11/23/2020    BILATERAL LUMBAR FACET STEROID INJECTION L3-S1 performed by Teresa Garcia MD at [address] Bilateral 4/26/2021    RIGHT L3 AND LEFT L4 EPIDURAL STEROID INJECTION performed by Teresa Garcia MD at [address] [facility]  07/20/2011    TONSILLECTOMY AND ADENOIDECTOMY         No Known Allergies      Current Outpatient Medications:     [START ON 11/21/2021] HYDROcodone-acetaminophen (NORCO) 5-325 MG per tablet, Take 1 tablet by mouth 2 times daily as needed for Pain for up to 30 days. , Disp: 60 tablet, Rfl: 0    gabapentin (NEURONTIN) 600 MG tablet, Take 1 tablet by mouth 3 times daily for 30 days. , Disp: 90 tablet, Rfl: 1    alfuzosin (UROXATRAL) 10 MG extended release tablet, Take 1 tablet by mouth daily, Disp: 90 tablet, Rfl: 3    ibuprofen (ADVIL;MOTRIN) 200 MG tablet, Take 200 mg by mouth every 6 hours as needed for Pain, Disp: , Rfl:     metoprolol succinate (TOPROL XL) 25 MG extended release tablet, TAKE ONE TABLET BY MOUTH DAILY, Disp: 90 tablet, Rfl: 0    finasteride (PROSCAR) 5 MG tablet, Take 1 tablet by mouth daily, Disp: 30 tablet, Rfl: 0    acetaminophen (TYLENOL) 325 MG tablet, Take 650 mg by mouth every 6 hours as needed for Pain, Disp: , Rfl:    Multiple Vitamin (M.V.I. ADULT IV), Infuse 1 tablet intravenously daily, Disp: , Rfl:     Ascorbic Acid (VITAMIN C) 250 MG tablet, Take 250 mg by mouth daily, Disp: , Rfl:     Cholecalciferol (VITAMIN D) 50 MCG (2000 UT) CAPS capsule, Take 1 capsule by mouth daily, Disp: , Rfl:     Handicap Placard MISC, Handicap Placard valid for 5 years. Expires 2026, Disp: 1 each, Rfl: 0    Family History   Problem Relation Age of Onset    Heart Disease Mother     Diabetes Mother     Stroke Father        Social History     Socioeconomic History    Marital status:      Spouse name: Not on file    Number of children: Not on file    Years of education: Not on file    Highest education level: Not on file   Occupational History    Not on file   Tobacco Use    Smoking status: Former Smoker    Smokeless tobacco: Never Used    Tobacco comment: quit 35 years ago    Substance and Sexual Activity    Alcohol use: Yes     Alcohol/week: 1.0 standard drink     Types: 1 Glasses of wine per week    Drug use: No    Sexual activity: Not on file   Other Topics Concern    Not on file   Social History Narrative    Not on file     Social Determinants of Health     Financial Resource Strain: Low Risk     Difficulty of Paying Living Expenses: Not hard at all   Food Insecurity: No Food Insecurity    Worried About Running Out of Food in the Last Year: Never true    [address] in the Last Year: Never true   Transportation Needs:     Lack of Transportation (Medical): Not on file    Lack of Transportation (Non-Medical):  Not on file   Physical Activity:     Days of Exercise per Week: Not on file    Minutes of Exercise per Session: Not on file   Stress:     Feeling of Stress : Not on file   Social Connections:     Frequency of Communication with Friends and Family: Not on file    Frequency of Social Gatherings with Friends and Family: Not on file    Attends Rastafari Services: Not on file   [employer] Clubs or Organizations: Not on file    Attends Club or Organization Meetings: Not on file    Marital Status: Not on file   Intimate Partner Violence:     Fear of Current or Ex-Partner: Not on file    Emotionally Abused: Not on file    Physically Abused: Not on file    Sexually Abused: Not on file   Housing Stability:     Unable to Pay for Housing in the Last Year: Not on file    Number of [place] in the Last Year: Not on file    Unstable Housing in the Last Year: Not on file       Review of Systems:  Review of Systems   Constitutional: Negative for chills and fever. Cardiovascular: Negative for chest pain. Respiratory: Negative for cough and shortness of breath. Musculoskeletal: Positive for arthritis, back pain, muscle weakness, myalgias, neck pain and stiffness. Negative for falls. Gastrointestinal: Negative for constipation. Neurological: Positive for numbness, paresthesias and weakness. Physical Exam:  BP (!) 102/58   Ht 5' 9\" (1.753 m)   Wt 155 lb (70.3 kg)   BMI 22.89 kg/m²     Physical Exam  Cardiovascular:      Rate and Rhythm: Normal rate. Pulmonary:      Effort: Pulmonary effort is normal.   Musculoskeletal:      Cervical back: Decreased range of motion. Lumbar back: Decreased range of motion. Comments: Stooped posture - using walker   Skin:     General: Skin is warm and dry. Neurological:      Mental Status: He is alert and oriented to person, place, and time.            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)    Relevant Medications    HYDROcodone-acetaminophen (NORCO) 5-325 MG per tablet (Start on 11/21/2021)    gabapentin (NEURONTIN) 600 MG tablet    Lumbosacral spondylosis without myelopathy (Chronic)    Relevant Medications    HYDROcodone-acetaminophen (NORCO) 5-325 MG per tablet (Start on 11/21/2021)    gabapentin (NEURONTIN) 600 MG tablet    Chronic use of opiate drug for therapeutic purpose    Relevant Medications    HYDROcodone-acetaminophen (NORCO) 5-325 MG per tablet (Start on 11/21/2021)             Treatment Plan:  Patient relates current medications are helping the pain. Patient reports taking pain medications as prescribed, denies obtaining medications from different sources and denies use of illegal drugs. Patient denies side effects from medications like nausea, vomiting, constipation or drowsiness. Patient reports current activities of daily living are possible due to medications and would like to continue them. As always, we encourage daily stretching and strengthening exercises, and recommend minimizing use of pain medications unless patient cannot get through daily activities due to pain. Contract requirements met. Continue opioid therapy.  Script written for norco and gabapentin  Pt to restart PT may need MRI if no improvement of symptoms  Follow up appointment made for 8 weeks

## 2021-11-29 ENCOUNTER — TELEPHONE (OUTPATIENT)
Dept: PAIN MANAGEMENT | Age: 86
End: 2021-11-29

## 2021-11-29 DIAGNOSIS — M48.062 SPINAL STENOSIS OF LUMBAR REGION WITH NEUROGENIC CLAUDICATION: Chronic | ICD-10-CM

## 2021-11-29 DIAGNOSIS — Z79.891 CHRONIC USE OF OPIATE DRUG FOR THERAPEUTIC PURPOSE: ICD-10-CM

## 2021-11-29 DIAGNOSIS — M47.817 LUMBOSACRAL SPONDYLOSIS WITHOUT MYELOPATHY: Chronic | ICD-10-CM

## 2021-11-29 NOTE — TELEPHONE ENCOUNTER
Pt left message requesting refill on Norco. Writer attempted to speak with pt to notify patient that refill was sent to pharmacy on 11/8/21 with earlies fill date of 11/21/21.  LVM informing pt of the above with instructions to call [facility] on [facility] to fill Rx that was already Escribed to pharmacy

## 2021-12-06 NOTE — DISCHARGE SUMMARY
[] [facility]        Outpatient Physical                Therapy       [address]       Phone: [phone]       Fax: [phone] [] [facility]       Promotion at [address] [place] [address]       Phone: [phone]       Fax: [phone] [] [facility]      [facility] Prom[facility]     [address] [facility]     Phone: [phone]     Fax:  [phone]     Physical Therapy Discharge Note    Date: 2021      Patient: Willem Forte  : 1929  MRN: [id number]    Physician: JERMAN Palacios                       Insurance: [employer]/[facility]  Medical Diagnosis: Neck pain, chronic (M54.2,G89.29 [ICD-10-CM])                    Rehab Codes: M54.2, M62.838  Onset Date: chronic; worse since                    Next 's appt. : tbd    Visit# / total visits: ; Progress note for Medicare patient due at visit 7                                      Cancels/No Shows: 0/0    Date of initial visit: 2021            Date of final visit: 10/6/2021       Discharge Status:     Pt failed to make additional appointments for therapy. Pt. Is now discharged. Electronically signed by: Braden Nicolas PT    If you have any questions or concerns, please don't hesitate to call.   Thank you for your referral.

## 2022-01-14 ENCOUNTER — OFFICE VISIT (OUTPATIENT)
Dept: PAIN MANAGEMENT | Age: 87
End: 2022-01-14
Payer: MEDICARE

## 2022-01-14 VITALS — OXYGEN SATURATION: 93 % | SYSTOLIC BLOOD PRESSURE: 109 MMHG | DIASTOLIC BLOOD PRESSURE: 63 MMHG | HEART RATE: 60 BPM

## 2022-01-14 DIAGNOSIS — Z79.891 CHRONIC USE OF OPIATE DRUG FOR THERAPEUTIC PURPOSE: ICD-10-CM

## 2022-01-14 DIAGNOSIS — M48.062 SPINAL STENOSIS OF LUMBAR REGION WITH NEUROGENIC CLAUDICATION: Chronic | ICD-10-CM

## 2022-01-14 DIAGNOSIS — M47.817 LUMBOSACRAL SPONDYLOSIS WITHOUT MYELOPATHY: Chronic | ICD-10-CM

## 2022-01-14 PROCEDURE — 99213 OFFICE O/P EST LOW 20 MIN: CPT | Performed by: NURSE PRACTITIONER

## 2022-01-14 PROCEDURE — G8427 DOCREV CUR MEDS BY ELIG CLIN: HCPCS | Performed by: NURSE PRACTITIONER

## 2022-01-14 PROCEDURE — G8484 FLU IMMUNIZE NO ADMIN: HCPCS | Performed by: NURSE PRACTITIONER

## 2022-01-14 PROCEDURE — 1123F ACP DISCUSS/DSCN MKR DOCD: CPT | Performed by: NURSE PRACTITIONER

## 2022-01-14 PROCEDURE — 1036F TOBACCO NON-USER: CPT | Performed by: NURSE PRACTITIONER

## 2022-01-14 PROCEDURE — G8420 CALC BMI NORM PARAMETERS: HCPCS | Performed by: NURSE PRACTITIONER

## 2022-01-14 PROCEDURE — 4040F PNEUMOC VAC/ADMIN/RCVD: CPT | Performed by: NURSE PRACTITIONER

## 2022-01-14 RX ORDER — HYDROCODONE BITARTRATE AND ACETAMINOPHEN 5; 325 MG/1; MG/1
1 TABLET ORAL 2 TIMES DAILY PRN
Qty: 60 TABLET | Refills: 0 | Status: SHIPPED | OUTPATIENT
Start: 2022-01-14 | End: 2022-03-10 | Stop reason: SDUPTHER

## 2022-01-14 RX ORDER — GABAPENTIN 600 MG/1
600 TABLET ORAL 3 TIMES DAILY
Qty: 90 TABLET | Refills: 1 | Status: SHIPPED | OUTPATIENT
Start: 2022-01-14 | End: 2022-03-10 | Stop reason: SDUPTHER

## 2022-01-14 ASSESSMENT — ENCOUNTER SYMPTOMS
SHORTNESS OF BREATH: 0
RESPIRATORY NEGATIVE: 1
CONSTIPATION: 0
COUGH: 0
BACK PAIN: 1

## 2022-01-14 NOTE — PROGRESS NOTES
Patient is here today to review medication contract. Chief Complaint   Patient presents with    Back Pain    Medication Refill         PMH     60-year-old man here in office with his son in law  C/o with chronic back pain with radiation down both legs aggravated with standing and walking. Diagnosed with severe lumbar spinal stenosis and Dr Nelda Scales minimally invasive lumbar decompression  but patient would like to wait. He has tried epidural injection, medication and physical therapy with minimal relief  No loss of bladder or bowel control  Currently take Norco BID prn along with gabapentin with minimal relief  Reports no side effect from the medication     Main complaint today is Right cervical neck pain with ROM. denies n/t or arm weakness. Reports pain as chronic and constant with no new injury to the area. Denies injections or surgery to the area. Started PT and completed only 2 visits before getting rt toe fx after stubbing toe. Plans to start inhome PT and if no relief of pain will consider injections     HPI:   Back Pain  This is a chronic problem. The current episode started more than 1 year ago. The problem occurs constantly. The problem is unchanged. The pain is present in the lumbar spine. The quality of the pain is described as aching. The pain radiates to the left foot and right foot. The pain is at a severity of 7/10. The pain is moderate. The pain is the same all the time. The symptoms are aggravated by bending and position. Pertinent negatives include no chest pain or fever. Neck Pain   This is a chronic problem. The current episode started more than 1 year ago. The problem occurs constantly. The problem has been unchanged. The pain is associated with nothing. The pain is present in the midline and right side. The quality of the pain is described as aching. The pain is at a severity of 6/10. The pain is moderate. The symptoms are aggravated by position (weather).  Pertinent negatives include no chest pain or fever. The treatment provided mild relief. Medication Refill: Norco, Gabapentin    Pain score Today:  7  Adverse effects (Constipation / Nausea / Sedation / sexual Dysfunction / others) : fatigue  Mood: fair  Sleep pattern and quality: fair to poor  Activity level: fair    Pill count Today:# 3 Norco counted last filled 11/30  Last dose taken  1/14/22  OARRS report reviewed today: yes  Morphine equivalent: 10  ER/Hospitalizations/PCP visit related to pain since last visit:no   Any legal problems e.g. DUI etc.:No  Satisfied with current management: Yes    Opioid Contract:9/18/2020  Last Urine Dug screen dated:9/18/2020 takes sparingly    Lab Results   Component Value Date    LABA1C 5.9 07/23/2021     Lab Results   Component Value Date     08/28/2020       Past Medical History, Past Surgical History, Social History, Allergies and Medications reviewed and updated in EPIC as indicated    Family History reviewed and is noncontributory. Controlled Substance Monitoring:    Acute and Chronic Pain Monitoring:   RX Monitoring 1/14/2022   Periodic Controlled Substance Monitoring Possible medication side effects, risk of tolerance/dependence & alternative treatments discussed. ;No signs of potential drug abuse or diversion identified. ;Assessed functional status. ;Obtaining appropriate analgesic effect of treatment. Chronic Pain > 50 MEDD -             Periodic Controlled Substance Monitoring: Possible medication side effects, risk of tolerance/dependence & alternative treatments discussed. ,No signs of potential drug abuse or diversion identified. ,Assessed functional status. ,Obtaining appropriate analgesic effect of treatment.  Tyesha Castañeda, APRN - CNP)      Past Medical History:   Diagnosis Date    Arrhythmia     BPH with obstruction/lower urinary tract symptoms     Caffeine use     3 coffee/day    Cardiac murmur     Cataracts, bilateral     Chronic back pain     Constipation  Enlarged prostate     Glaucoma     Hypertension     Prostatitis        Past Surgical History:   Procedure Laterality Date    EYE SURGERY      HERNIA REPAIR  2006    PAIN MANAGEMENT PROCEDURE Bilateral 11/12/2020    BILATERAL LUMBAR FACET STEROID INJECTION    L3-S1 performed by Irving Fam MD at [address] [place] [address] Bilateral 11/23/2020    BILATERAL LUMBAR FACET STEROID INJECTION L3-S1 performed by Irving aFm MD at [address] [place] [address] Bilateral 4/26/2021    RIGHT L3 AND LEFT L4 EPIDURAL STEROID INJECTION performed by Irving Fam MD at [facility]  07/20/2011    TONSILLECTOMY AND ADENOIDECTOMY         No Known Allergies      Current Outpatient Medications:     gabapentin (NEURONTIN) 600 MG tablet, Take 1 tablet by mouth 3 times daily for 30 days. , Disp: 90 tablet, Rfl: 1    HYDROcodone-acetaminophen (NORCO) 5-325 MG per tablet, Take 1 tablet by mouth 2 times daily as needed for Pain for up to 30 days. , Disp: 60 tablet, Rfl: 0    linaclotide (LINZESS) 145 MCG capsule, Take 1 capsule by mouth every morning (before breakfast), Disp: 30 capsule, Rfl: 1    alfuzosin (UROXATRAL) 10 MG extended release tablet, Take 1 tablet by mouth daily, Disp: 90 tablet, Rfl: 3    ibuprofen (ADVIL;MOTRIN) 200 MG tablet, Take 200 mg by mouth every 6 hours as needed for Pain, Disp: , Rfl:     metoprolol succinate (TOPROL XL) 25 MG extended release tablet, TAKE ONE TABLET BY MOUTH DAILY, Disp: 90 tablet, Rfl: 0    finasteride (PROSCAR) 5 MG tablet, Take 1 tablet by mouth daily, Disp: 30 tablet, Rfl: 0    Ascorbic Acid (VITAMIN C) 250 MG tablet, Take 250 mg by mouth daily, Disp: , Rfl:     Cholecalciferol (VITAMIN D) 50 MCG (2000 UT) CAPS capsule, Take 1 capsule by mouth daily, Disp: , Rfl:     linaclotide (LINZESS) 145 MCG capsule, Take 1 capsule by mouth every morning (before breakfast), Disp: 30 capsule, Rfl: 1    acetaminophen (TYLENOL) 325 MG tablet, Take 650 mg by mouth every 6 hours as needed for Pain (Patient not taking: Reported on 1/14/2022), Disp: , Rfl:     Multiple Vitamin (M.V.I. ADULT IV), Infuse 1 tablet intravenously daily, Disp: , Rfl:     Handicap Placard MISC, Handicap Placard valid for 5 years. Expires 2026, Disp: 1 each, Rfl: 0    Family History   Problem Relation Age of Onset    Heart Disease Mother     Diabetes Mother     Stroke Father        Social History     Socioeconomic History    Marital status:      Spouse name: Not on file    Number of children: Not on file    Years of education: Not on file    Highest education level: Not on file   Occupational History    Not on file   Tobacco Use    Smoking status: Former Smoker    Smokeless tobacco: Never Used    Tobacco comment: quit 35 years ago    Substance and Sexual Activity    Alcohol use: Yes     Alcohol/week: 1.0 standard drink     Types: 1 Glasses of wine per week    Drug use: No    Sexual activity: Not on file   Other Topics Concern    Not on file   Social History Narrative    Not on file     Social Determinants of Health     Financial Resource Strain: Low Risk     Difficulty of Paying Living Expenses: Not hard at all   Food Insecurity: No Food Insecurity    Worried About Running Out of Food in the Last Year: Never true    [address] in the Last Year: Never true   Transportation Needs:     Lack of Transportation (Medical): Not on file    Lack of Transportation (Non-Medical):  Not on file   Physical Activity:     Days of Exercise per Week: Not on file    Minutes of Exercise per Session: Not on file   Stress:     Feeling of Stress : Not on file   Social Connections:     Frequency of Communication with Friends and Family: Not on file    Frequency of Social Gatherings with Friends and Family: Not on file    Attends Roman Catholic Services: Not on file    Active Member of Clubs or Organizations: Not on file    Attends Club or Organization Meetings: Not on file  Marital Status: Not on file   Intimate Partner Violence:     Fear of Current or Ex-Partner: Not on file    Emotionally Abused: Not on file    Physically Abused: Not on file    Sexually Abused: Not on file   Housing Stability:     Unable to Pay for Housing in the Last Year: Not on file    Number of Places Lived in the Last Year: Not on file    Unstable Housing in the Last Year: Not on file       Review of Systems:  Review of Systems   Constitutional: Negative. Negative for chills and fever. Cardiovascular: Negative for chest pain. Respiratory: Negative. Negative for cough and shortness of breath. Musculoskeletal: Positive for back pain and neck pain. Gastrointestinal: Negative for constipation. Neurological: Negative. Physical Exam:  /63   Pulse 60   SpO2 93%     Physical Exam  Cardiovascular:      Rate and Rhythm: Normal rate. Pulmonary:      Effort: Pulmonary effort is normal.   Musculoskeletal:      Cervical back: Decreased range of motion. Lumbar back: Decreased range of motion. Comments: Stooped posture - using walker   Skin:     General: Skin is warm and dry. Neurological:      Mental Status: He is alert and oriented to person, place, and time. 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  Patient reports taking pain medications as prescribed, denies obtaining medications from different sources and denies use of illegal drugs. Patient denies side effects from medications like nausea, vomiting, constipation or drowsiness. Patient reports current activities of daily living are possible due to medications and would like to continue them. As always, we encourage daily stretching and strengthening exercises, and recommend minimizing use of pain medications unless patient cannot get through daily activities due to pain. Contract requirements met. Continue opioid therapy.  Script written for norco and gabapentin  Pt to call next month for refill if needed  Consider cervical injections if PT ineffective  Follow up appointment made for 8 weeks

## 2022-01-19 ENCOUNTER — TELEPHONE (OUTPATIENT)
Dept: PAIN MANAGEMENT | Age: 87
End: 2022-01-19

## 2022-01-19 NOTE — TELEPHONE ENCOUNTER
Pt states he is out of Gabapentin and pharmacy will not allow him to fill Rx without approval from MD. Spoke with pharmacist who states the earliest they can fill Gabapentin would be tomorrow.  Pt last filled Gabapentin on 12/23/21 according to pharmacist. Please advise

## 2022-01-19 NOTE — TELEPHONE ENCOUNTER
Notified pt that pharmacy will fill Gabapentin for him today. Pt states he has not yet made a decision for the MILD procedure. Patients states he would like to continue with physical therapy and will let Dr. Sil Anne know once his decision soon.

## 2022-03-10 ENCOUNTER — TELEPHONE (OUTPATIENT)
Dept: PAIN MANAGEMENT | Age: 87
End: 2022-03-10

## 2022-03-10 DIAGNOSIS — Z79.891 CHRONIC USE OF OPIATE DRUG FOR THERAPEUTIC PURPOSE: ICD-10-CM

## 2022-03-10 DIAGNOSIS — M48.062 SPINAL STENOSIS OF LUMBAR REGION WITH NEUROGENIC CLAUDICATION: Chronic | ICD-10-CM

## 2022-03-10 DIAGNOSIS — M47.817 LUMBOSACRAL SPONDYLOSIS WITHOUT MYELOPATHY: Chronic | ICD-10-CM

## 2022-03-10 RX ORDER — GABAPENTIN 600 MG/1
600 TABLET ORAL 3 TIMES DAILY
Qty: 90 TABLET | Refills: 1 | Status: SHIPPED | OUTPATIENT
Start: 2022-03-10 | End: 2022-04-20 | Stop reason: SDUPTHER

## 2022-03-10 RX ORDER — HYDROCODONE BITARTRATE AND ACETAMINOPHEN 5; 325 MG/1; MG/1
1 TABLET ORAL 2 TIMES DAILY PRN
Qty: 14 TABLET | Refills: 0 | Status: SHIPPED | OUTPATIENT
Start: 2022-03-10 | End: 2022-03-14 | Stop reason: SDUPTHER

## 2022-03-14 ENCOUNTER — OFFICE VISIT (OUTPATIENT)
Dept: PAIN MANAGEMENT | Age: 87
End: 2022-03-14
Payer: MEDICARE

## 2022-03-14 VITALS
WEIGHT: 157.4 LBS | HEART RATE: 65 BPM | SYSTOLIC BLOOD PRESSURE: 110 MMHG | HEIGHT: 69 IN | DIASTOLIC BLOOD PRESSURE: 64 MMHG | BODY MASS INDEX: 23.31 KG/M2 | OXYGEN SATURATION: 98 %

## 2022-03-14 DIAGNOSIS — M47.817 LUMBOSACRAL SPONDYLOSIS WITHOUT MYELOPATHY: Chronic | ICD-10-CM

## 2022-03-14 DIAGNOSIS — G89.29 NECK PAIN, CHRONIC: Primary | ICD-10-CM

## 2022-03-14 DIAGNOSIS — Z79.891 CHRONIC USE OF OPIATE DRUG FOR THERAPEUTIC PURPOSE: ICD-10-CM

## 2022-03-14 DIAGNOSIS — M48.062 SPINAL STENOSIS OF LUMBAR REGION WITH NEUROGENIC CLAUDICATION: Chronic | ICD-10-CM

## 2022-03-14 DIAGNOSIS — M54.2 NECK PAIN, CHRONIC: Primary | ICD-10-CM

## 2022-03-14 PROCEDURE — 1123F ACP DISCUSS/DSCN MKR DOCD: CPT | Performed by: NURSE PRACTITIONER

## 2022-03-14 PROCEDURE — G8484 FLU IMMUNIZE NO ADMIN: HCPCS | Performed by: NURSE PRACTITIONER

## 2022-03-14 PROCEDURE — G8420 CALC BMI NORM PARAMETERS: HCPCS | Performed by: NURSE PRACTITIONER

## 2022-03-14 PROCEDURE — 4040F PNEUMOC VAC/ADMIN/RCVD: CPT | Performed by: NURSE PRACTITIONER

## 2022-03-14 PROCEDURE — 99213 OFFICE O/P EST LOW 20 MIN: CPT | Performed by: NURSE PRACTITIONER

## 2022-03-14 PROCEDURE — 1036F TOBACCO NON-USER: CPT | Performed by: NURSE PRACTITIONER

## 2022-03-14 PROCEDURE — G8427 DOCREV CUR MEDS BY ELIG CLIN: HCPCS | Performed by: NURSE PRACTITIONER

## 2022-03-14 RX ORDER — HYDROCODONE BITARTRATE AND ACETAMINOPHEN 5; 325 MG/1; MG/1
1 TABLET ORAL 2 TIMES DAILY PRN
Qty: 60 TABLET | Refills: 0 | Status: SHIPPED | OUTPATIENT
Start: 2022-03-17 | End: 2022-04-20 | Stop reason: SDUPTHER

## 2022-03-14 ASSESSMENT — ENCOUNTER SYMPTOMS
WHEEZING: 0
SHORTNESS OF BREATH: 0
BACK PAIN: 1
SORE THROAT: 0
COUGH: 0

## 2022-03-14 NOTE — PROGRESS NOTES
Chief Complaint:  Chief Complaint   Patient presents with    Medication Refill     Norco    Back Pain    Neck Pain         PMH     80year-old man here in office with his son in law  C/o with chronic back pain with radiation down both legs aggravated with standing and walking. Diagnosed with severe lumbar spinal stenosis and Dr Yrn Kumar minimally invasive lumbar decompression  but patient would like to wait. He has tried epidural injection, medication and physical therapy with minimal relief  No loss of bladder or bowel control  Currently take Norco BID prn along with gabapentin with minimal relief  Reports no side effect from the medication     Main complaint today is Right cervical neck pain with ROM. denies n/t or arm weakness. Reports pain as chronic and constant with no new injury to the area. Denies injections or surgery to the area. Started PT and completed only 2 visits before getting rt toe fx after stubbing toe. Plans to start inhome PT and if no relief of pain will consider injections     MILD procedure suggested for back pain - pt wants to try PT before deciding. HPI:   Back Pain  This is a chronic problem. The current episode started more than 1 year ago. The problem occurs constantly. The problem is unchanged. The pain is present in the lumbar spine. The quality of the pain is described as aching. Radiates to: at time to feet. The pain is at a severity of 5/10. The pain is moderate. The pain is worse during the day. The symptoms are aggravated by standing and position. Pertinent negatives include no fever, headaches or numbness. Risk factors include poor posture. He has tried home exercises for the symptoms. The treatment provided moderate relief. Neck Pain   This is a chronic problem. The current episode started more than 1 year ago. The problem occurs constantly. The pain is associated with nothing. The pain is present in the midline and right side.  The quality of the pain is described as shooting, stabbing and aching. The pain is at a severity of 7/10. The pain is moderate. Nothing aggravates the symptoms. Pertinent negatives include no fever, headaches or numbness. He has tried home exercises for the symptoms. The treatment provided mild relief. Medication Refill: Norco    Pain score Today:  7  Adverse effects (Constipation / Nausea / Sedation / sexual Dysfunction / others) : no  Mood: good  Sleep pattern and quality: good  Activity level: fair    Pill count Today:10   Last dose taken 03/13/2022 pm  OARRS report reviewed today: yes  Morphine equivalent: 10  ER/Hospitalizations/PCP visit related to pain since last visit:no   Any legal problems e.g. DUI etc.:No  Satisfied with current management: Yes    Opioid Contract:NA  Last Urine Dug screen dated:09/18/2020  Lab Results   Component Value Date    LABA1C 5.9 07/23/2021     Lab Results   Component Value Date     08/28/2020       Past Medical History, Past Surgical History, Social History, Allergies and Medications reviewed and updated in EPIC as indicated    Family History reviewed and is noncontributory. Controlled Substance Monitoring:    Acute and Chronic Pain Monitoring:   RX Monitoring 3/14/2022   Periodic Controlled Substance Monitoring Possible medication side effects, risk of tolerance/dependence & alternative treatments discussed. ;No signs of potential drug abuse or diversion identified. ;Assessed functional status. ;Obtaining appropriate analgesic effect of treatment. Chronic Pain > 50 MEDD -           Periodic Controlled Substance Monitoring: Possible medication side effects, risk of tolerance/dependence & alternative treatments discussed. ,No signs of potential drug abuse or diversion identified. ,Assessed functional status. ,Obtaining appropriate analgesic effect of treatment.  Lakshmi Goodrich, APRN - CNP)      Past Medical History:   Diagnosis Date    Arrhythmia     BPH with obstruction/lower urinary tract Acid (VITAMIN C) 250 MG tablet, Take 250 mg by mouth daily, Disp: , Rfl:     Cholecalciferol (VITAMIN D) 50 MCG (2000 UT) CAPS capsule, Take 1 capsule by mouth daily, Disp: , Rfl:     Handicap Placard MISC, Handicap Placard valid for 5 years. Expires 2026, Disp: 1 each, Rfl: 0    Family History   Problem Relation Age of Onset    Heart Disease Mother     Diabetes Mother     Stroke Father        Social History     Socioeconomic History    Marital status:      Spouse name: Not on file    Number of children: Not on file    Years of education: Not on file    Highest education level: Not on file   Occupational History    Not on file   Tobacco Use    Smoking status: Former Smoker    Smokeless tobacco: Never Used    Tobacco comment: quit 35 years ago    Substance and Sexual Activity    Alcohol use: Yes     Alcohol/week: 1.0 standard drink     Types: 1 Glasses of wine per week    Drug use: No    Sexual activity: Not on file   Other Topics Concern    Not on file   Social History Narrative    Not on file     Social Determinants of Health     Financial Resource Strain: Low Risk     Difficulty of Paying Living Expenses: Not hard at all   Food Insecurity: No Food Insecurity    Worried About Running Out of Food in the Last Year: Never true    [address] in the Last Year: Never true   Transportation Needs:     Lack of Transportation (Medical): Not on file    Lack of Transportation (Non-Medical):  Not on file   Physical Activity:     Days of Exercise per Week: Not on file    Minutes of Exercise per Session: Not on file   Stress:     Feeling of Stress : Not on file   Social Connections:     Frequency of Communication with Friends and Family: Not on file    Frequency of Social Gatherings with Friends and Family: Not on file    Attends Baptism Services: Not on file    Active Member of Clubs or Organizations: Not on file    Attends Club or Organization Meetings: Not on file    Marital Status: Not on file   Intimate Partner Violence:     Fear of Current or Ex-Partner: Not on file    Emotionally Abused: Not on file    Physically Abused: Not on file    Sexually Abused: Not on file   Housing Stability:     Unable to Pay for Housing in the Last Year: Not on file    Number of [place] in the Last Year: Not on file    Unstable Housing in the Last Year: Not on file       Review of Systems:  Review of Systems   Constitutional: Negative for decreased appetite, fever and night sweats. HENT: Negative for congestion and sore throat. Respiratory: Negative for cough, shortness of breath and wheezing. Musculoskeletal: Positive for back pain, muscle weakness, neck pain and stiffness. Neurological: Negative for dizziness, headaches and numbness. Physical Exam:  /64   Pulse 65   Ht 5' 9\" (1.753 m)   Wt 157 lb 6.4 oz (71.4 kg)   SpO2 98%   BMI 23.24 kg/m²     Physical Exam  Cardiovascular:      Rate and Rhythm: Normal rate. Pulmonary:      Effort: Pulmonary effort is normal.   Musculoskeletal:      Cervical back: Decreased range of motion. Comments: Stooped posture - using walker   Skin:     General: Skin is warm and dry. Neurological:      Mental Status: He is alert and oriented to person, place, and time.              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)    Relevant Medications    HYDROcodone-acetaminophen (Phil Meliza) 5-325 MG per tablet (Start on 3/17/2022)    Lumbosacral spondylosis without myelopathy (Chronic)    Relevant Medications    HYDROcodone-acetaminophen (NORCO) 5-325 MG per tablet (Start on 3/17/2022)    Chronic use of opiate drug for therapeutic purpose    Relevant Medications    HYDROcodone-acetaminophen (NORCO) 5-325 MG per tablet (Start on 3/17/2022)    Neck pain, chronic - Primary    Relevant Medications    HYDROcodone-acetaminophen (NORCO) 5-325 MG per tablet (Start on 3/17/2022)             Treatment Plan:  Patient relates current medications are helping the pain. Patient reports taking pain medications as prescribed, denies obtaining medications from different sources and denies use of illegal drugs. Patient denies side effects from medications like nausea, vomiting, constipation or drowsiness. Patient reports current activities of daily living are possible due to medications and would like to continue them. As always, we encourage daily stretching and strengthening exercises, and recommend minimizing use of pain medications unless patient cannot get through daily activities due to pain. Contract requirements met. Continue opioid therapy.  Script written for norco  Pt to continue PT and think about MATHEW and MILD  Follow up appointment made for 4 weeks

## 2022-04-20 ENCOUNTER — OFFICE VISIT (OUTPATIENT)
Dept: PAIN MANAGEMENT | Age: 87
End: 2022-04-20
Payer: MEDICARE

## 2022-04-20 VITALS
OXYGEN SATURATION: 96 % | DIASTOLIC BLOOD PRESSURE: 60 MMHG | SYSTOLIC BLOOD PRESSURE: 110 MMHG | BODY MASS INDEX: 23.25 KG/M2 | HEIGHT: 69 IN | WEIGHT: 157 LBS | HEART RATE: 59 BPM

## 2022-04-20 DIAGNOSIS — M47.817 LUMBOSACRAL SPONDYLOSIS WITHOUT MYELOPATHY: Chronic | ICD-10-CM

## 2022-04-20 DIAGNOSIS — Z79.891 CHRONIC USE OF OPIATE DRUG FOR THERAPEUTIC PURPOSE: ICD-10-CM

## 2022-04-20 DIAGNOSIS — M48.062 SPINAL STENOSIS OF LUMBAR REGION WITH NEUROGENIC CLAUDICATION: Chronic | ICD-10-CM

## 2022-04-20 PROCEDURE — 99213 OFFICE O/P EST LOW 20 MIN: CPT | Performed by: NURSE PRACTITIONER

## 2022-04-20 PROCEDURE — 1036F TOBACCO NON-USER: CPT | Performed by: NURSE PRACTITIONER

## 2022-04-20 PROCEDURE — G8427 DOCREV CUR MEDS BY ELIG CLIN: HCPCS | Performed by: NURSE PRACTITIONER

## 2022-04-20 PROCEDURE — 1123F ACP DISCUSS/DSCN MKR DOCD: CPT | Performed by: NURSE PRACTITIONER

## 2022-04-20 PROCEDURE — G8420 CALC BMI NORM PARAMETERS: HCPCS | Performed by: NURSE PRACTITIONER

## 2022-04-20 PROCEDURE — 4040F PNEUMOC VAC/ADMIN/RCVD: CPT | Performed by: NURSE PRACTITIONER

## 2022-04-20 RX ORDER — GABAPENTIN 600 MG/1
600 TABLET ORAL 3 TIMES DAILY
Qty: 90 TABLET | Refills: 1 | Status: SHIPPED | OUTPATIENT
Start: 2022-04-20 | End: 2022-06-06 | Stop reason: SDUPTHER

## 2022-04-20 RX ORDER — HYDROCODONE BITARTRATE AND ACETAMINOPHEN 5; 325 MG/1; MG/1
1 TABLET ORAL 2 TIMES DAILY PRN
Qty: 60 TABLET | Refills: 0 | Status: SHIPPED | OUTPATIENT
Start: 2022-04-25 | End: 2022-06-06 | Stop reason: SDUPTHER

## 2022-04-20 ASSESSMENT — ENCOUNTER SYMPTOMS
NAUSEA: 0
CONSTIPATION: 0
BACK PAIN: 1
COUGH: 0
DIARRHEA: 0
SHORTNESS OF BREATH: 0
VOMITING: 0
WHEEZING: 0
SORE THROAT: 0

## 2022-04-20 NOTE — PROGRESS NOTES
Chief Complaint   Patient presents with    Back Pain    Neck Pain    Medication Refill     Norco, Gabapentin    Leg Pain       PMH     80year-old man here in office with his son in law  C/o with chronic back pain with radiation down both legs aggravated with standing and walking. Diagnosed with severe lumbar spinal stenosis and Dr Yue River minimally invasive lumbar decompression  but patient would like to wait. He has tried epidural injection, medication and physical therapy with minimal relief  No loss of bladder or bowel control  Currently take Norco BID prn along with gabapentin with minimal relief  Reports no side effect from the medication     Main complaint today is Right cervical neck pain with ROM. denies n/t or arm weakness. Reports pain as chronic and constant with no new injury to the area. Denies injections or surgery to the area. Started PT and completed only 2 visits before getting rt toe fx after stubbing toe. Pt had inhome PT and  no relief of pain. Offered injection but pt and his son are not interested at this time      HPI:   Back Pain  This is a chronic problem. The current episode started more than 1 year ago. The problem occurs constantly. The problem is unchanged. The pain radiates to the left foot and right foot. The pain is at a severity of 10/10. The pain is severe. The pain is the same all the time. The symptoms are aggravated by position and standing. Pertinent negatives include no chest pain, fever or paresthesias. He has tried analgesics for the symptoms. The treatment provided mild relief. Neck Pain   This is a chronic problem. The current episode started more than 1 year ago. The problem occurs constantly. The problem has been unchanged. The pain is associated with nothing. The pain is present in the left side, midline and right side. The quality of the pain is described as aching, burning and stabbing. The pain is at a severity of 10/10. The pain is severe. The symptoms are aggravated by position. Pertinent negatives include no chest pain or fever. He has tried oral narcotics for the symptoms. The treatment provided mild relief. Medication Refill:  Norco, Gabapentin     Pain score Today:  10  Adverse effects (Constipation / Nausea / Sedation / sexual Dysfunction / others) : no  Mood: poor  Sleep pattern and quality: poor  Activity level: poor    Pill count Today:5 4/25 short 5 tabs  Last dose taken  04/19/2022 PM  OARRS report reviewed today: yes  ER/Hospitalizations/PCP visit related to pain since last visit:no   Any legal problems e.g. DUI etc.:No  Satisfied with current management: Yes    Opioid Contract:09/18/2020  Last Urine Dug screen dated:09/18/2020    Lab Results   Component Value Date    LABA1C 5.9 07/23/2021     Lab Results   Component Value Date     08/28/2020       Past Medical History, Past Surgical History, Social History, Allergies and Medications reviewed and updated in EPIC as indicated    Family History reviewed and is noncontributory. Periodic Controlled Substance Monitoring: Possible medication side effects, risk of tolerance/dependence & alternative treatments discussed. ,No signs of potential drug abuse or diversion identified. ,Assessed functional status. ,Obtaining appropriate analgesic effect of treatment.  Haroldo Stevens, APRN - CNP)      Past Medical History:   Diagnosis Date    Arrhythmia     BPH with obstruction/lower urinary tract symptoms     Caffeine use     3 coffee/day    Cardiac murmur     Cataracts, bilateral     Chronic back pain     Constipation     Enlarged prostate     Glaucoma     Hypertension     Prostatitis        Past Surgical History:   Procedure Laterality Date    EYE SURGERY      HERNIA REPAIR  2006    PAIN MANAGEMENT PROCEDURE Bilateral 11/12/2020    BILATERAL LUMBAR FACET STEROID INJECTION    L3-S1 performed by Aishwarya Courtney MD at [place] [id number] Bilateral 11/23/2020 BILATERAL LUMBAR FACET STEROID INJECTION L3-S1 performed by Aishwarya Courtney MD at [facility] [address] Bilateral 4/26/2021    RIGHT L3 AND LEFT L4 EPIDURAL STEROID INJECTION performed by Aishwarya Courtney MD at [address] [place] [address]  07/20/2011    TONSILLECTOMY AND ADENOIDECTOMY         No Known Allergies      Current Outpatient Medications:     gabapentin (NEURONTIN) 600 MG tablet, Take 1 tablet by mouth 3 times daily for 30 days. , Disp: 90 tablet, Rfl: 1    [START ON 4/25/2022] HYDROcodone-acetaminophen (NORCO) 5-325 MG per tablet, Take 1 tablet by mouth 2 times daily as needed for Pain for up to 30 days. , Disp: 60 tablet, Rfl: 0    linaclotide (LINZESS) 145 MCG capsule, Take 1 capsule by mouth every morning (before breakfast), Disp: 30 capsule, Rfl: 1    alfuzosin (UROXATRAL) 10 MG extended release tablet, Take 1 tablet by mouth daily, Disp: 90 tablet, Rfl: 3    ibuprofen (ADVIL;MOTRIN) 200 MG tablet, Take 200 mg by mouth every 6 hours as needed for Pain, Disp: , Rfl:     metoprolol succinate (TOPROL XL) 25 MG extended release tablet, TAKE ONE TABLET BY MOUTH DAILY, Disp: 90 tablet, Rfl: 0    finasteride (PROSCAR) 5 MG tablet, Take 1 tablet by mouth daily, Disp: 30 tablet, Rfl: 0    acetaminophen (TYLENOL) 325 MG tablet, Take 650 mg by mouth every 6 hours as needed for Pain, Disp: , Rfl:     Multiple Vitamin (M.V.I. ADULT IV), Infuse 1 tablet intravenously daily, Disp: , Rfl:     Ascorbic Acid (VITAMIN C) 250 MG tablet, Take 250 mg by mouth daily, Disp: , Rfl:     Cholecalciferol (VITAMIN D) 50 MCG (2000 UT) CAPS capsule, Take 1 capsule by mouth daily, Disp: , Rfl:     Handicap Placard MISC, Handicap Placard valid for 5 years.  Expires 2026, Disp: 1 each, Rfl: 0    Family History   Problem Relation Age of Onset    Heart Disease Mother     Diabetes Mother     Stroke Father        Social History     Socioeconomic History    Marital status:      Spouse name: Not on file    Number of children: Not on file    Years of education: Not on file    Highest education level: Not on file   Occupational History    Not on file   Tobacco Use    Smoking status: Former Smoker    Smokeless tobacco: Never Used    Tobacco comment: quit 35 years ago    Substance and Sexual Activity    Alcohol use: Yes     Alcohol/week: 1.0 standard drink     Types: 1 Glasses of wine per week    Drug use: No    Sexual activity: Not on file   Other Topics Concern    Not on file   Social History Narrative    Not on file     Social Determinants of Health     Financial Resource Strain: Low Risk     Difficulty of Paying Living Expenses: Not hard at all   Food Insecurity: No Food Insecurity    Worried About Running Out of Food in the Last Year: Never true    [address] in the Last Year: Never true   Transportation Needs:     Lack of Transportation (Medical): Not on file    Lack of Transportation (Non-Medical): Not on file   Physical Activity:     Days of Exercise per Week: Not on file    Minutes of Exercise per Session: Not on file   Stress:     Feeling of Stress : Not on file   Social Connections:     Frequency of Communication with Friends and Family: Not on file    Frequency of Social Gatherings with Friends and Family: Not on file    Attends Church Services: Not on file    Active Member of [address] [place] [address] [employer] or Organizations: Not on file    Attends Club or Organization Meetings: Not on file    Marital Status: Not on file   Intimate Partner Violence:     Fear of Current or Ex-Partner: Not on file    Emotionally Abused: Not on file    Physically Abused: Not on file    Sexually Abused: Not on file   Housing Stability:     Unable to Pay for Housing in the Last Year: Not on file    Number of [place] in the Last Year: Not on file    Unstable Housing in the Last Year: Not on file       Review of Systems:  Review of Systems   Constitutional: Negative for chills and fever.    HENT: Negative for congestion and sore throat. Cardiovascular: Negative for chest pain. Respiratory: Negative for cough, shortness of breath and wheezing. Musculoskeletal: Positive for back pain, joint pain, neck pain and stiffness. Gastrointestinal: Negative for constipation, diarrhea, nausea and vomiting. Neurological: Negative for paresthesias. Physical Exam:  /60   Pulse 59   Ht 5' 9\" (1.753 m)   Wt 157 lb (71.2 kg)   SpO2 96%   BMI 23.18 kg/m²     Physical Exam  Cardiovascular:      Rate and Rhythm: Normal rate. Pulmonary:      Effort: Pulmonary effort is normal.   Musculoskeletal:      Cervical back: Decreased range of motion. Lumbar back: Decreased range of motion. Comments: Stooped posture - using walker   Skin:     General: Skin is warm and dry. Neurological:      Mental Status: He is alert and oriented to person, place, and time. 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)    Relevant Medications    gabapentin (NEURONTIN) 600 MG tablet    HYDROcodone-acetaminophen (NORCO) 5-325 MG per tablet (Start on 4/25/2022)    Lumbosacral spondylosis without myelopathy (Chronic)    Relevant Medications    gabapentin (NEURONTIN) 600 MG tablet    HYDROcodone-acetaminophen (Donel Siskin) 5-325 MG per tablet (Start on 4/25/2022)    Chronic use of opiate drug for therapeutic purpose    Relevant Medications    HYDROcodone-acetaminophen (NORCO) 5-325 MG per tablet (Start on 4/25/2022)             Treatment Plan:  Patient relates current medications are helping the pain. Patient reports taking pain medications as prescribed, denies obtaining medications from different sources and denies use of illegal drugs. Patient denies side effects from medications like nausea, vomiting, constipation or drowsiness.  Patient reports current activities of daily living are possible due to medications and would like to continue them.     As always, we encourage daily stretching and strengthening exercises, and recommend minimizing use of pain medications unless patient cannot get through daily activities due to pain. Contract requirements met. Continue opioid therapy. Script written for norco gabapentin  Pt to call next month for refill  Follow up appointment made for 8 weeks    I have reviewed the chief complaint and history of present illness (including ROS and PFSH) and vital documentation by my staff and I agree with their documentation and have added where applicable.

## 2022-06-06 ENCOUNTER — TELEPHONE (OUTPATIENT)
Dept: PAIN MANAGEMENT | Age: 87
End: 2022-06-06

## 2022-06-06 DIAGNOSIS — M47.817 LUMBOSACRAL SPONDYLOSIS WITHOUT MYELOPATHY: Chronic | ICD-10-CM

## 2022-06-06 DIAGNOSIS — Z79.891 CHRONIC USE OF OPIATE DRUG FOR THERAPEUTIC PURPOSE: ICD-10-CM

## 2022-06-06 DIAGNOSIS — M48.062 SPINAL STENOSIS OF LUMBAR REGION WITH NEUROGENIC CLAUDICATION: Chronic | ICD-10-CM

## 2022-06-06 RX ORDER — HYDROCODONE BITARTRATE AND ACETAMINOPHEN 5; 325 MG/1; MG/1
1 TABLET ORAL 2 TIMES DAILY PRN
Qty: 60 TABLET | Refills: 0 | Status: SHIPPED | OUTPATIENT
Start: 2022-06-06 | End: 2022-06-27 | Stop reason: SDUPTHER

## 2022-06-06 RX ORDER — GABAPENTIN 600 MG/1
600 TABLET ORAL 3 TIMES DAILY
Qty: 90 TABLET | Refills: 1 | Status: SHIPPED | OUTPATIENT
Start: 2022-06-06 | End: 2022-08-29 | Stop reason: SDUPTHER

## 2022-06-20 ENCOUNTER — OFFICE VISIT (OUTPATIENT)
Dept: PAIN MANAGEMENT | Age: 87
End: 2022-06-20
Payer: MEDICARE

## 2022-06-20 VITALS
SYSTOLIC BLOOD PRESSURE: 112 MMHG | DIASTOLIC BLOOD PRESSURE: 55 MMHG | BODY MASS INDEX: 23.34 KG/M2 | HEART RATE: 64 BPM | OXYGEN SATURATION: 98 % | HEIGHT: 68 IN | WEIGHT: 154 LBS

## 2022-06-20 DIAGNOSIS — M48.062 SPINAL STENOSIS OF LUMBAR REGION WITH NEUROGENIC CLAUDICATION: Chronic | ICD-10-CM

## 2022-06-20 DIAGNOSIS — Z79.891 CHRONIC USE OF OPIATE DRUG FOR THERAPEUTIC PURPOSE: ICD-10-CM

## 2022-06-20 DIAGNOSIS — M47.817 LUMBOSACRAL SPONDYLOSIS WITHOUT MYELOPATHY: Chronic | ICD-10-CM

## 2022-06-20 PROCEDURE — G8420 CALC BMI NORM PARAMETERS: HCPCS | Performed by: NURSE PRACTITIONER

## 2022-06-20 PROCEDURE — 99213 OFFICE O/P EST LOW 20 MIN: CPT | Performed by: NURSE PRACTITIONER

## 2022-06-20 PROCEDURE — G8427 DOCREV CUR MEDS BY ELIG CLIN: HCPCS | Performed by: NURSE PRACTITIONER

## 2022-06-20 PROCEDURE — 1123F ACP DISCUSS/DSCN MKR DOCD: CPT | Performed by: NURSE PRACTITIONER

## 2022-06-20 PROCEDURE — 1036F TOBACCO NON-USER: CPT | Performed by: NURSE PRACTITIONER

## 2022-06-20 RX ORDER — GABAPENTIN 600 MG/1
600 TABLET ORAL 3 TIMES DAILY
Qty: 90 TABLET | Refills: 1 | Status: CANCELLED | OUTPATIENT
Start: 2022-06-20 | End: 2022-07-20

## 2022-06-20 RX ORDER — HYDROCODONE BITARTRATE AND ACETAMINOPHEN 5; 325 MG/1; MG/1
1 TABLET ORAL 2 TIMES DAILY PRN
Qty: 60 TABLET | Refills: 0 | Status: CANCELLED | OUTPATIENT
Start: 2022-06-20 | End: 2022-07-20

## 2022-06-20 ASSESSMENT — ENCOUNTER SYMPTOMS
SHORTNESS OF BREATH: 0
BACK PAIN: 1
COUGH: 0
CONSTIPATION: 0

## 2022-06-20 NOTE — PROGRESS NOTES
Chief Complaint   Patient presents with    Back Pain    Neck Pain    Medication Refill     norco, gabapentin          PMH     80year-old man here in office with his son in law  C/o with chronic back pain with radiation down both legs aggravated with standing and walking. Diagnosed with severe lumbar spinal stenosis and Dr Rebel Walls minimally invasive lumbar decompression  but patient would like to wait. He has tried epidural injection, medication and physical therapy with minimal relief  No loss of bladder or bowel control  Currently take Norco BID prn along with gabapentin with minimal relief  Reports no side effect from the medication     Main complaint today is Right cervical neck pain with ROM. denies n/t or arm weakness. Reports pain as chronic and constant with no new injury to the area. Denies injections or surgery to the area. Started PT and completed only 2 visits before getting rt toe fx after stubbing toe. Pt had inhome PT and no relief of pain. Offered injection but pt and his son are not interested at this time - Pt would like to Dr Shira Perla first for his opinoin    HPI:   Back Pain  This is a chronic problem. The current episode started more than 1 year ago. The problem occurs constantly. The problem is unchanged. The pain is present in the lumbar spine. The quality of the pain is described as aching. The pain radiates to the right foot and left foot. The pain is at a severity of 8/10. The pain is moderate. The pain is the same all the time. The symptoms are aggravated by bending, position and standing. Pertinent negatives include no chest pain, fever, numbness or paresthesias. Risk factors include poor posture, sedentary lifestyle and lack of exercise. He has tried analgesics for the symptoms. The treatment provided mild relief. Neck Pain   This is a chronic problem. The current episode started more than 1 year ago. The problem occurs constantly.  The problem has been gradually worsening. The pain is associated with nothing. The pain is present in the midline, right side and left side. The pain is at a severity of 8/10. The pain is moderate. Nothing aggravates the symptoms. The pain is same all the time. Pertinent negatives include no chest pain, fever or numbness. He has tried oral narcotics for the symptoms. The treatment provided mild relief. Medication Refill: Norco, gabapentin     Pain score Today:  8  Adverse effects (Constipation / Nausea / Sedation / sexual Dysfunction / others) : Costipation   Mood: fair  Sleep pattern and quality: good  Activity level: fair    Pill count Today: did not bring - just filled and due 7/5  Last dose taken  06/20/2022  OARRS report reviewed today: yes  Morphine equivalent: 10  ER/Hospitalizations/PCP visit related to pain since last visit:no   Any legal problems e.g. DUI etc.:No  Satisfied with current management: Yes      Opioid Contract: 09/18/2020  Last Urine Dug screen dated: 09/18/2020    Lab Results   Component Value Date    LABA1C 5.9 07/23/2021     Lab Results   Component Value Date     08/28/2020       Past Medical History, Past Surgical History, Social History, Allergies and Medications reviewed and updated in EPIC as indicated    Family History reviewed and is noncontributory. Controlled Substance Monitoring:    Acute and Chronic Pain Monitoring:   RX Monitoring 6/20/2022   Periodic Controlled Substance Monitoring Possible medication side effects, risk of tolerance/dependence & alternative treatments discussed. ;No signs of potential drug abuse or diversion identified. ;Assessed functional status. ;Random urine drug screen sent today.;Obtaining appropriate analgesic effect of treatment. Chronic Pain > 50 MEDD -             Periodic Controlled Substance Monitoring: Possible medication side effects, risk of tolerance/dependence & alternative treatments discussed. ,No signs of potential drug abuse or diversion identified. ,Assessed functional status. ,Random urine drug screen sent today. ,Obtaining appropriate analgesic effect of treatment. Susannah Brown, APRN - CNP)      Past Medical History:   Diagnosis Date    Arrhythmia     BPH with obstruction/lower urinary tract symptoms     Caffeine use     3 coffee/day    Cardiac murmur     Cataracts, bilateral     Chronic back pain     Constipation     Enlarged prostate     Glaucoma     Hypertension     Prostatitis        Past Surgical History:   Procedure Laterality Date    EYE SURGERY      HERNIA REPAIR  2006    PAIN MANAGEMENT PROCEDURE Bilateral 11/12/2020    BILATERAL LUMBAR FACET STEROID INJECTION    L3-S1 performed by Rissa [facility]gowan [facility] at [facility] Bilateral 11/23/2020    BILATERAL LUMBAR FACET STEROID INJECTION L3-S1 performed by Rissa Mcgowan MD at [facility] Bilateral 4/26/2021    RIGHT L3 AND LEFT L4 EPIDURAL STEROID INJECTION performed by Rissa Mcgowan MD at [address] [place] [address]  07/20/2011    TONSILLECTOMY AND ADENOIDECTOMY         No Known Allergies      Current Outpatient Medications:     gabapentin (NEURONTIN) 600 MG tablet, Take 1 tablet by mouth 3 times daily for 30 days. , Disp: 90 tablet, Rfl: 1    HYDROcodone-acetaminophen (NORCO) 5-325 MG per tablet, Take 1 tablet by mouth 2 times daily as needed for Pain for up to 30 days. , Disp: 60 tablet, Rfl: 0    linaclotide (LINZESS) 145 MCG capsule, Take 1 capsule by mouth every morning (before breakfast), Disp: 30 capsule, Rfl: 1    alfuzosin (UROXATRAL) 10 MG extended release tablet, Take 1 tablet by mouth daily, Disp: 90 tablet, Rfl: 3    ibuprofen (ADVIL;MOTRIN) 200 MG tablet, Take 200 mg by mouth every 6 hours as needed for Pain, Disp: , Rfl:     metoprolol succinate (TOPROL XL) 25 MG extended release tablet, TAKE ONE TABLET BY MOUTH DAILY, Disp: 90 tablet, Rfl: 0    finasteride (PROSCAR) 5 MG tablet, Take 1 tablet by mouth daily, Disp: 30 tablet, Rfl: 0    acetaminophen (TYLENOL) 325 MG tablet, Take 650 mg by mouth every 6 hours as needed for Pain, Disp: , Rfl:     Multiple Vitamin (M.V.I. ADULT IV), Infuse 1 tablet intravenously daily, Disp: , Rfl:     Ascorbic Acid (VITAMIN C) 250 MG tablet, Take 250 mg by mouth daily, Disp: , Rfl:     Cholecalciferol (VITAMIN D) 50 MCG (2000 UT) CAPS capsule, Take 1 capsule by mouth daily, Disp: , Rfl:     Handicap Placard MISC, Handicap Placard valid for 5 years. Expires 2026, Disp: 1 each, Rfl: 0    Family History   Problem Relation Age of Onset    Heart Disease Mother     Diabetes Mother     Stroke Father        Social History     Socioeconomic History    Marital status:      Spouse name: Not on file    Number of children: Not on file    Years of education: Not on file    Highest education level: Not on file   Occupational History    Not on file   Tobacco Use    Smoking status: Former Smoker    Smokeless tobacco: Never Used    Tobacco comment: quit 35 years ago    Substance and Sexual Activity    Alcohol use: Yes     Alcohol/week: 1.0 standard drink     Types: 1 Glasses of wine per week    Drug use: No    Sexual activity: Not on file   Other Topics Concern    Not on file   Social History Narrative    Not on file     Social Determinants of Health     Financial Resource Strain: Low Risk     Difficulty of Paying Living Expenses: Not hard at all   Food Insecurity: No Food Insecurity    Worried About Running Out of Food in the Last Year: Never true    [address] in the Last Year: Never true   Transportation Needs:     Lack of Transportation (Medical): Not on file    Lack of Transportation (Non-Medical):  Not on file   Physical Activity:     Days of Exercise per Week: Not on file    Minutes of Exercise per Session: Not on file   Stress:     Feeling of Stress : Not on file   Social Connections:     Frequency of Communication with Friends and Family: Not on file    Frequency of Social Gatherings with Friends and Family: Not on file    Attends [employer] Services: Not on file    Active Member of Clubs or Organizations: Not on file    Attends Club or Organization Meetings: Not on file    Marital Status: Not on file   Intimate Partner Violence:     Fear of Current or Ex-Partner: Not on file    Emotionally Abused: Not on file    Physically Abused: Not on file    Sexually Abused: Not on file   Housing Stability:     Unable to Pay for Housing in the Last Year: Not on file    Number of [place] in the Last Year: Not on file    Unstable Housing in the Last Year: Not on file       Review of Systems:  Review of Systems   Constitutional: Negative for chills and fever. Cardiovascular: Negative for chest pain. Respiratory: Negative for cough and shortness of breath. Musculoskeletal: Positive for back pain, neck pain and stiffness. Gastrointestinal: Negative for constipation. Neurological: Negative for numbness and paresthesias. Physical Exam:  BP (!) 112/55   Pulse 64   Ht 5' 8\" (1.727 m)   Wt 154 lb (69.9 kg)   SpO2 98%   BMI 23.42 kg/m²     Physical Exam  Cardiovascular:      Rate and Rhythm: Normal rate. Pulmonary:      Effort: Pulmonary effort is normal.   Musculoskeletal:         General: Normal range of motion. Comments: Stooped posture - using walker   Skin:     General: Skin is warm and dry. Neurological:      Mental Status: He is alert and oriented to person, place, and time. 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)    Lumbosacral spondylosis without myelopathy (Chronic)    Chronic use of opiate drug for therapeutic purpose    Relevant Orders    DRUG SCREEN, PAIN             Treatment Plan:  Patient relates current medications are helping the pain.  Patient reports taking pain medications as prescribed, denies obtaining medications from different sources and denies use of illegal drugs. Patient denies side effects from medications like nausea, vomiting, constipation or drowsiness. Patient reports current activities of daily living are possible due to medications and would like to continue them. As always, we encourage daily stretching and strengthening exercises, and recommend minimizing use of pain medications unless patient cannot get through daily activities due to pain. Contract requirements met. Continue opioid therapy. Script  not needed  TODD obtained today for monitoring purposes. Last dose of medication was last night   Appt made ASAP with MD per pt request   Follow up appointment for med refill made for sept     I have reviewed the chief complaint and history of present illness (including ROS and PFSH) and vital documentation by my staff and I agree with their documentation and have added where applicable.

## 2022-06-27 RX ORDER — HYDROCODONE BITARTRATE AND ACETAMINOPHEN 5; 325 MG/1; MG/1
1 TABLET ORAL 2 TIMES DAILY PRN
Qty: 60 TABLET | Refills: 0 | Status: SHIPPED | OUTPATIENT
Start: 2022-07-06 | End: 2022-07-26 | Stop reason: SDUPTHER

## 2022-07-26 ENCOUNTER — OFFICE VISIT (OUTPATIENT)
Dept: PAIN MANAGEMENT | Age: 87
End: 2022-07-26
Payer: MEDICARE

## 2022-07-26 VITALS — WEIGHT: 148 LBS | BODY MASS INDEX: 22.43 KG/M2 | HEIGHT: 68 IN

## 2022-07-26 DIAGNOSIS — M47.817 LUMBOSACRAL SPONDYLOSIS WITHOUT MYELOPATHY: Chronic | ICD-10-CM

## 2022-07-26 DIAGNOSIS — Z79.891 CHRONIC USE OF OPIATE DRUG FOR THERAPEUTIC PURPOSE: Primary | ICD-10-CM

## 2022-07-26 DIAGNOSIS — M48.062 SPINAL STENOSIS OF LUMBAR REGION WITH NEUROGENIC CLAUDICATION: Chronic | ICD-10-CM

## 2022-07-26 DIAGNOSIS — M54.51 VERTEBROGENIC LOW BACK PAIN: ICD-10-CM

## 2022-07-26 PROCEDURE — 1036F TOBACCO NON-USER: CPT | Performed by: ANESTHESIOLOGY

## 2022-07-26 PROCEDURE — G8420 CALC BMI NORM PARAMETERS: HCPCS | Performed by: ANESTHESIOLOGY

## 2022-07-26 PROCEDURE — G8427 DOCREV CUR MEDS BY ELIG CLIN: HCPCS | Performed by: ANESTHESIOLOGY

## 2022-07-26 PROCEDURE — 99213 OFFICE O/P EST LOW 20 MIN: CPT | Performed by: ANESTHESIOLOGY

## 2022-07-26 PROCEDURE — 1123F ACP DISCUSS/DSCN MKR DOCD: CPT | Performed by: ANESTHESIOLOGY

## 2022-07-26 RX ORDER — HYDROCODONE BITARTRATE AND ACETAMINOPHEN 5; 325 MG/1; MG/1
1 TABLET ORAL 2 TIMES DAILY PRN
Qty: 60 TABLET | Refills: 0 | Status: SHIPPED | OUTPATIENT
Start: 2022-07-26 | End: 2022-08-29 | Stop reason: SDUPTHER

## 2022-07-26 ASSESSMENT — ENCOUNTER SYMPTOMS
RESPIRATORY NEGATIVE: 1
BACK PAIN: 1

## 2022-07-26 NOTE — PROGRESS NOTES
The patient is a 80 y. o. Non- / non  male. Chief Complaint   Patient presents with    Back Pain    Medication Refill        HPI  80-year-old man with chronic back pain radiation down both legs aggravated with standing and walking alleviates with rest right side more affected than left  No loss of bladder or bowel control  Diagnosed with severe lumbar spinal stenosis  Tried epidural injection medication physical therapy  I have suggested minimally invasive lumbar decompression using mild procedure    Currently take Norco along with gabapentin  Find it a little bit helpful  Pain is poorly controlled  Reports no side effect from the medication  Medication Refill:  Norco    Constant aching nagging throbbing burning sensation in back and legs that aggravated with activity and interfere with quality of life  No changes in bladder or bowel control from baseline  Pain score Today:  8  Adverse effects (Constipation / Nausea / Sedation / sexual Dysfunction / others) :  none  Mood: fair to poor  Sleep pattern and quality: fair  Activity level: fair    Pill count Today: 2  Last dose taken  couple nights ago  OARRS report reviewed today: yes  ER/Hospitalizations/PCP visit related to pain since last visit:no   Any legal problems e.g. DUI etc.:No  Satisfied with current management: Yes    Opioid Contract:  Last Urine Dug screen dated:06/20/2022    Lab Results   Component Value Date    LABA1C 5.9 07/23/2021     Lab Results   Component Value Date     08/28/2020       Past Medical History, Past Surgical History, Social History, Allergies and Medications reviewed and updated in EPIC as indicated    Family History reviewed and is noncontributory.         Past Medical History:   Diagnosis Date    Arrhythmia     BPH with obstruction/lower urinary tract symptoms     Caffeine use     3 coffee/day    Cardiac murmur     Cataracts, bilateral     Chronic back pain     Constipation     Enlarged prostate     Glaucoma Hypertension     Prostatitis         Past Surgical History:   Procedure Laterality Date    EYE SURGERY      HERNIA REPAIR  2006    PAIN MANAGEMENT PROCEDURE Bilateral 11/12/2020    BILATERAL LUMBAR FACET STEROID INJECTION    L3-S1 performed by Zackary Yuen MD at [address] [place] [address] Bilateral 11/23/2020    BILATERAL LUMBAR FACET STEROID INJECTION L3-S1 performed by Zackary Yuen MD at [address] [place] [address] Bilateral 4/26/2021    RIGHT L3 AND LEFT L4 EPIDURAL STEROID INJECTION performed by Zackary Yuen MD at [address] [place] [address]  07/20/2011    TONSILLECTOMY AND ADENOIDECTOMY         Social History     Socioeconomic History    Marital status:      Spouse name: None    Number of children: None    Years of education: None    Highest education level: None   Tobacco Use    Smoking status: Former    Smokeless tobacco: Never    Tobacco comments:     quit 35 years ago    Substance and Sexual Activity    Alcohol use: Yes     Alcohol/week: 1.0 standard drink     Types: 1 Glasses of wine per week    Drug use: No       Family History   Problem Relation Age of Onset    Heart Disease Mother     Diabetes Mother     Stroke Father        No Known Allergies    There were no vitals filed for this visit. Current Outpatient Medications   Medication Sig Dispense Refill    HYDROcodone-acetaminophen (NORCO) 5-325 MG per tablet Take 1 tablet by mouth 2 times daily as needed for Pain for up to 30 days.  60 tablet 0    linaclotide (LINZESS) 145 MCG capsule Take 1 capsule by mouth every morning (before breakfast) 30 capsule 1    alfuzosin (UROXATRAL) 10 MG extended release tablet Take 1 tablet by mouth daily 90 tablet 3    ibuprofen (ADVIL;MOTRIN) 200 MG tablet Take 200 mg by mouth every 6 hours as needed for Pain      metoprolol succinate (TOPROL XL) 25 MG extended release tablet TAKE ONE TABLET BY MOUTH DAILY 90 tablet 0    finasteride (PROSCAR) 5 MG tablet Take 1 tablet by mouth daily 30 tablet 0    acetaminophen (TYLENOL) 325 MG tablet Take 650 mg by mouth every 6 hours as needed for Pain      Multiple Vitamin (M.V.I. ADULT IV) Infuse 1 tablet intravenously daily      Ascorbic Acid (VITAMIN C) 250 MG tablet Take 250 mg by mouth daily      Cholecalciferol (VITAMIN D) 50 MCG (2000 UT) CAPS capsule Take 1 capsule by mouth daily      Handicap Placard MISC Handicap Placard valid for 5 years. Expires 2026 1 each 0    gabapentin (NEURONTIN) 600 MG tablet Take 1 tablet by mouth 3 times daily for 30 days. 90 tablet 1     No current facility-administered medications for this visit. Review of Systems   Constitutional: Negative. Negative for fever. HENT: Negative. Eyes:  Positive for visual disturbance. Respiratory: Negative. Cardiovascular: Negative. Musculoskeletal:  Positive for back pain. Objective:  General Appearance:  Comfortable, in no acute distress and in pain. Vital signs: (most recent): Height 5' 8\" (1.727 m), weight 148 lb (67.1 kg). Vital signs are normal.  No fever. Output: Producing urine and producing stool. Lungs:  Normal effort. He is not in respiratory distress. Heart: Normal rate. Neurological: Patient is alert and oriented to person, place and time. Assessment & Plan    1. Chronic use of opiate drug for therapeutic purpose    2. Spinal stenosis of lumbar region with neurogenic claudication    3. Lumbosacral spondylosis without myelopathy    4. Vertebrogenic low back pain        No orders of the defined types were placed in this encounter. Orders Placed This Encounter   Medications    HYDROcodone-acetaminophen (NORCO) 5-325 MG per tablet     Sig: Take 1 tablet by mouth 2 times daily as needed for Pain for up to 30 days.      Dispense:  60 tablet     Refill:  0     Reduce doses taken as pain becomes manageable      Controlled Substance Monitoring:    Acute and Chronic Pain Monitoring:   RX Monitoring 7/26/2022   Periodic Controlled Substance Monitoring Possible medication side effects, risk of tolerance/dependence & alternative treatments discussed. ;Assessed functional status. ;No signs of potential drug abuse or diversion identified. Chronic Pain > 50 MEDD Obtained or confirmed \"Consent for Opioid Use\" on file.                Electronically signed by Josiane Matute MD on 7/26/2022 at 1:33 PM

## 2022-08-26 ENCOUNTER — TELEPHONE (OUTPATIENT)
Dept: PAIN MANAGEMENT | Age: 87
End: 2022-08-26

## 2022-08-26 ENCOUNTER — HOSPITAL ENCOUNTER (EMERGENCY)
Facility: CLINIC | Age: 87
Discharge: HOME | End: 2022-08-26
Attending: EMERGENCY MEDICINE
Payer: MEDICARE

## 2022-08-26 VITALS
SYSTOLIC BLOOD PRESSURE: 118 MMHG | RESPIRATION RATE: 16 BRPM | HEART RATE: 72 BPM | TEMPERATURE: 98.5 F | BODY MASS INDEX: 22.43 KG/M2 | OXYGEN SATURATION: 96 % | DIASTOLIC BLOOD PRESSURE: 60 MMHG | HEIGHT: 68 IN | WEIGHT: 148 LBS

## 2022-08-26 DIAGNOSIS — H61.23 BILATERAL IMPACTED CERUMEN: Primary | ICD-10-CM

## 2022-08-26 PROCEDURE — 99282 EMERGENCY DEPT VISIT SF MDM: CPT

## 2022-08-26 ASSESSMENT — PAIN DESCRIPTION - PAIN TYPE: TYPE: ACUTE PAIN

## 2022-08-26 ASSESSMENT — PAIN DESCRIPTION - ORIENTATION: ORIENTATION: LEFT

## 2022-08-26 ASSESSMENT — PAIN SCALES - GENERAL: PAINLEVEL_OUTOF10: 4

## 2022-08-26 ASSESSMENT — PAIN DESCRIPTION - LOCATION: LOCATION: EAR

## 2022-08-26 ASSESSMENT — PAIN - FUNCTIONAL ASSESSMENT
PAIN_FUNCTIONAL_ASSESSMENT: 0-10
PAIN_FUNCTIONAL_ASSESSMENT: NONE - DENIES PAIN

## 2022-08-26 ASSESSMENT — PAIN DESCRIPTION - FREQUENCY: FREQUENCY: INTERMITTENT

## 2022-08-26 NOTE — ED PROVIDER NOTES
[address] [place] [address] ED  [facility]  Phone: [address]      Pt Name: Mi Nieto  MRN: [id number]  [place] 4/17/1929  Date of evaluation: 8/26/2022    CHIEF COMPLAINT       Chief Complaint   Patient presents with    Otalgia       HISTORY OF PRESENT ILLNESS    Mi Nieto is a 80 y.o. male who presents to the emergency department with cerumen impaction. Went to the [facility] 2 weeks ago was told that he has cerumen in his ear was told to use drops but they did not irrigate it at that time. He says his left ear is worse. Denies any other symptoms. REVIEW OF SYSTEMS       Constitutional: No fevers or chills   HEENT: No rhinorrhea, positive left earache  Eyes: No double vision  Musculoskeletal: No facial swelling or pain   Skin: No facial rash   Neurological: No headache     PAST MEDICAL HISTORY    has a past medical history of Arrhythmia, BPH with obstruction/lower urinary tract symptoms, Caffeine use, Cardiac murmur, Cataracts, bilateral, Chronic back pain, Constipation, Enlarged prostate, Glaucoma, Hypertension, and Prostatitis. SURGICAL HISTORY      has a past surgical history that includes eye surgery; hernia repair (2006); shoulder surgery (07/20/2011); Pain management procedure (Bilateral, 11/12/2020); Pain management procedure (Bilateral, 11/23/2020); Pain management procedure (Bilateral, 4/26/2021); and Tonsillectomy and adenoidectomy.     CURRENT MEDICATIONS       Previous Medications    ACETAMINOPHEN (TYLENOL) 325 MG TABLET    Take 650 mg by mouth every 6 hours as needed for Pain    ALFUZOSIN (UROXATRAL) 10 MG EXTENDED RELEASE TABLET    Take 1 tablet by mouth daily    ASCORBIC ACID (VITAMIN C) 250 MG TABLET    Take 250 mg by mouth daily    CHOLECALCIFEROL (VITAMIN D) 50 MCG (2000 UT) CAPS CAPSULE    Take 1 capsule by mouth daily    FINASTERIDE (PROSCAR) 5 MG TABLET    Take 1 tablet by mouth daily    GABAPENTIN (NEURONTIN) 600 MG TABLET    Take above    LABS:  No results found for this visit on 08/26/22. Not indicated unless otherwise documented above    RADIOLOGY:   I reviewed the radiologist interpretations:    No orders to display       Not indicated unless otherwise documented above    EMERGENCY DEPARTMENT COURSE:     The patient was given the following medications:  No orders of the defined types were placed in this encounter. Vitals:   -------------------------  /60   Pulse 72   Temp 98.5 °F (36.9 °C) (Oral)   Resp 16   Ht 5' 8\" (1.727 m)   Wt 67.1 kg (148 lb)   SpO2 96%   BMI 22.50 kg/m²     10 AM reevaluation both ears cerumen removed. TMs intact no erythema no signs of infection. Follow-up as needed. The patient and his visitor understands that at this time there is no evidence for a more malignant underlying process, but also understands that early in the process of an illness or injury, an emergency department workup can be falsely reassuring. Routine discharge counseling was given, and it is understood that worsening, changing or persistent symptoms should prompt an immediate call or follow up with their primary physician or return to the emergency department. The importance of appropriate follow up was also discussed. I have reviewed the disposition diagnosis. I have answered the questions and given discharge instructions. There was voiced understanding of these instructions and no further questions or complaints. CRITICAL CARE:    None    CONSULTS:    None    PROCEDURES:    None      OARRS Report if indicated             FINAL IMPRESSION      1.  Bilateral impacted cerumen          DISPOSITION/PLAN   DISPOSITION Decision To Discharge 08/26/2022 10:05:14 AM        CONDITION ON DISPOSITION: STABLE       PATIENT REFERRED TO:  Tiago White MD  [address] [facility] [address] [phone]    Schedule an appointment as soon as possible for a visit   As needed    DISCHARGE MEDICATIONS:  New Prescriptions    No medications on file       (Please note that portions of this note were completed with a voice recognition program.  Efforts were made to edit the dictations but occasionally words are mis-transcribed.)    Salma Nair DO   Attending Emergency Physician       Salma Nair DO  08/26/22 1001

## 2022-08-26 NOTE — DISCHARGE INSTRUCTIONS
Thank you for your service! Return immediately if any worsening symptoms or any other concerns    Tell us how we did visit: [email or web address]   and let us know about your experience

## 2022-08-29 DIAGNOSIS — Z79.891 CHRONIC USE OF OPIATE DRUG FOR THERAPEUTIC PURPOSE: ICD-10-CM

## 2022-08-29 DIAGNOSIS — M48.062 SPINAL STENOSIS OF LUMBAR REGION WITH NEUROGENIC CLAUDICATION: Chronic | ICD-10-CM

## 2022-08-29 DIAGNOSIS — M47.817 LUMBOSACRAL SPONDYLOSIS WITHOUT MYELOPATHY: Chronic | ICD-10-CM

## 2022-08-29 RX ORDER — GABAPENTIN 600 MG/1
600 TABLET ORAL 3 TIMES DAILY
Qty: 90 TABLET | Refills: 1 | Status: SHIPPED | OUTPATIENT
Start: 2022-08-29 | End: 2022-10-28 | Stop reason: SDUPTHER

## 2022-08-29 RX ORDER — HYDROCODONE BITARTRATE AND ACETAMINOPHEN 5; 325 MG/1; MG/1
1 TABLET ORAL 2 TIMES DAILY PRN
Qty: 60 TABLET | Refills: 0 | Status: SHIPPED | OUTPATIENT
Start: 2022-08-29 | End: 2022-10-28 | Stop reason: SDUPTHER

## 2022-09-16 ENCOUNTER — OFFICE VISIT (OUTPATIENT)
Dept: PAIN MANAGEMENT | Age: 87
End: 2022-09-16
Payer: MEDICARE

## 2022-09-16 VITALS
DIASTOLIC BLOOD PRESSURE: 59 MMHG | WEIGHT: 148 LBS | OXYGEN SATURATION: 95 % | HEIGHT: 68 IN | SYSTOLIC BLOOD PRESSURE: 101 MMHG | BODY MASS INDEX: 22.43 KG/M2 | HEART RATE: 56 BPM

## 2022-09-16 DIAGNOSIS — M48.062 SPINAL STENOSIS OF LUMBAR REGION WITH NEUROGENIC CLAUDICATION: Chronic | ICD-10-CM

## 2022-09-16 DIAGNOSIS — M47.817 LUMBOSACRAL SPONDYLOSIS WITHOUT MYELOPATHY: Chronic | ICD-10-CM

## 2022-09-16 DIAGNOSIS — Z79.891 CHRONIC USE OF OPIATE DRUG FOR THERAPEUTIC PURPOSE: ICD-10-CM

## 2022-09-16 PROCEDURE — G8427 DOCREV CUR MEDS BY ELIG CLIN: HCPCS | Performed by: NURSE PRACTITIONER

## 2022-09-16 PROCEDURE — 99213 OFFICE O/P EST LOW 20 MIN: CPT | Performed by: NURSE PRACTITIONER

## 2022-09-16 PROCEDURE — 1036F TOBACCO NON-USER: CPT | Performed by: NURSE PRACTITIONER

## 2022-09-16 PROCEDURE — G8420 CALC BMI NORM PARAMETERS: HCPCS | Performed by: NURSE PRACTITIONER

## 2022-09-16 PROCEDURE — 1123F ACP DISCUSS/DSCN MKR DOCD: CPT | Performed by: NURSE PRACTITIONER

## 2022-09-16 RX ORDER — HYDROCODONE BITARTRATE AND ACETAMINOPHEN 5; 325 MG/1; MG/1
1 TABLET ORAL 2 TIMES DAILY PRN
Qty: 60 TABLET | Refills: 0 | Status: CANCELLED | OUTPATIENT
Start: 2022-09-16 | End: 2022-10-16

## 2022-09-16 RX ORDER — GABAPENTIN 600 MG/1
600 TABLET ORAL 3 TIMES DAILY
Qty: 90 TABLET | Refills: 1 | Status: CANCELLED | OUTPATIENT
Start: 2022-09-16 | End: 2022-10-16

## 2022-09-16 ASSESSMENT — ENCOUNTER SYMPTOMS
SNORING: 0
DIARRHEA: 0
VOMITING: 0
WHEEZING: 0
SHORTNESS OF BREATH: 0
CONSTIPATION: 0
COUGH: 0
BACK PAIN: 1

## 2022-09-16 NOTE — PROGRESS NOTES
Chief Complaint   Patient presents with    Back Pain    Medication Refill     Gabapentin, norco          PMH     27-year-old man here in office with his son in law  C/o with chronic back pain with radiation down both legs aggravated with standing and walking. Diagnosed with severe lumbar spinal stenosis and Dr Moses Palacios suggested minimally invasive lumbar decompression  and pt would like to schedule   He has tried epidural injection, medication and physical therapy with minimal relief  No loss of bladder or bowel control  Currently take Norco BID prn along with gabapentin with minimal relief  Reports no side effect from the medication         HPI:   Back Pain  This is a chronic problem. The current episode started more than 1 year ago. The problem occurs constantly. The pain is present in the lumbar spine. The quality of the pain is described as aching. The pain radiates to the left foot and right foot. Associated symptoms include leg pain and weakness. Pertinent negatives include no chest pain, fever, headaches or numbness. Risk factors include poor posture, sedentary lifestyle and lack of exercise. He has tried analgesics for the symptoms.       Medication Refill: Norco gabapentin     Pain score Today:  10  Adverse effects (Constipation / Nausea / Sedation / sexual Dysfunction / others) : constipation   Mood: good  Sleep pattern and quality: fair  Activity level: fair    Pill count Today:Pt instructed to bring pills to appt in order to document compliance, verbalized understanding   Last dose taken  09/16/2022  OAR[place] report reviewed today: yes  ER/Hospitalizations/PCP visit related to pain since last visit:no   Any legal problems e.g. DUI etc.:No  Satisfied with current management: jahaira    Opioid Contract:**  Last Urine Dug screen dated: 06/20/2022    Lab Results   Component Value Date    LABA1C 5.9 07/23/2021     Lab Results   Component Value Date     08/28/2020       Past Medical History, Past Surgical History, Social History, Allergies and Medications reviewed and updated in EPIC as indicated    Family History reviewed and is noncontributory. Patient denies any new neurological symptoms. No bowel or bladder incontinence, no weakness, and no falling. Pill count: appropriate 24  due 9/28    Morphine equivalent: 10    Controlled Substance Monitoring:    Acute and Chronic Pain Monitoring:   RX Monitoring 9/16/2022   Periodic Controlled Substance Monitoring Possible medication side effects, risk of tolerance/dependence & alternative treatments discussed. ;No signs of potential drug abuse or diversion identified. ;Assessed functional status. ;Obtaining appropriate analgesic effect of treatment. Chronic Pain > 50 MEDD -          Periodic Controlled Substance Monitoring: Possible medication side effects, risk of tolerance/dependence & alternative treatments discussed., No signs of potential drug abuse or diversion identified. , Assessed functional status., Obtaining appropriate analgesic effect of treatment.  Diane Marquez, APRN - CNP)      Past Medical History:   Diagnosis Date    Arrhythmia     BPH with obstruction/lower urinary tract symptoms     Caffeine use     3 coffee/day    Cardiac murmur     Cataracts, bilateral     Chronic back pain     Constipation     Enlarged prostate     Glaucoma     Hypertension     Prostatitis        Past Surgical History:   Procedure Laterality Date    EYE SURGERY      HERNIA REPAIR  2006    PAIN MANAGEMENT PROCEDURE Bilateral 11/12/2020    BILATERAL LUMBAR FACET STEROID INJECTION    L3-S1 performed by Trent Perez MD at [facility] Bilateral 11/23/2020    BILATERAL LUMBAR FACET STEROID INJECTION L3-S1 performed by Trent Perez MD at [facility] Bilateral 4/26/2021    RIGHT L3 AND LEFT L4 EPIDURAL STEROID INJECTION performed by Trent Perez MD at [facility]  07/20/2011    TONSILLECTOMY AND ADENOIDECTOMY         No Known Allergies      Current Outpatient Medications:     gabapentin (NEURONTIN) 600 MG tablet, Take 1 tablet by mouth 3 times daily for 30 days. , Disp: 90 tablet, Rfl: 1    HYDROcodone-acetaminophen (NORCO) 5-325 MG per tablet, Take 1 tablet by mouth 2 times daily as needed for Pain for up to 30 days. , Disp: 60 tablet, Rfl: 0    linaclotide (LINZESS) 145 MCG capsule, Take 1 capsule by mouth every morning (before breakfast), Disp: 30 capsule, Rfl: 1    alfuzosin (UROXATRAL) 10 MG extended release tablet, Take 1 tablet by mouth daily, Disp: 90 tablet, Rfl: 3    ibuprofen (ADVIL;MOTRIN) 200 MG tablet, Take 200 mg by mouth every 6 hours as needed for Pain, Disp: , Rfl:     metoprolol succinate (TOPROL XL) 25 MG extended release tablet, TAKE ONE TABLET BY MOUTH DAILY, Disp: 90 tablet, Rfl: 0    finasteride (PROSCAR) 5 MG tablet, Take 1 tablet by mouth daily, Disp: 30 tablet, Rfl: 0    acetaminophen (TYLENOL) 325 MG tablet, Take 650 mg by mouth every 6 hours as needed for Pain, Disp: , Rfl:     Multiple Vitamin (M.V.I. ADULT IV), Infuse 1 tablet intravenously daily, Disp: , Rfl:     Ascorbic Acid (VITAMIN C) 250 MG tablet, Take 250 mg by mouth daily, Disp: , Rfl:     Cholecalciferol (VITAMIN D) 50 MCG (2000 UT) CAPS capsule, Take 1 capsule by mouth daily, Disp: , Rfl:     Handicap Placard MISC, Handicap Placard valid for 5 years. Expires 2026, Disp: 1 each, Rfl: 0    Family History   Problem Relation Age of Onset    Heart Disease Mother     Diabetes Mother     Stroke Father        Social History     Socioeconomic History    Marital status:      Spouse name: Not on file    Number of children: Not on file    Years of education: Not on file    Highest education level: Not on file   Occupational History    Not on file   Tobacco Use    Smoking status: Former    Smokeless tobacco: Never    Tobacco comments:     quit 35 years ago    Substance and Sexual Activity    Alcohol use:  Yes Alcohol/week: 1.0 standard drink     Types: 1 Glasses of wine per week    Drug use: No    Sexual activity: Not on file   Other Topics Concern    Not on file   Social History Narrative    Not on file     Social Determinants of Health     Financial Resource Strain: Not on file   Food Insecurity: Not on file   Transportation Needs: Not on file   Physical Activity: Not on file   Stress: Not on file   Social Connections: Not on file   Intimate Partner Violence: Not on file   Housing Stability: Not on file       Review of Systems:  Review of Systems   Constitutional: Negative for chills, fever and malaise/fatigue. Cardiovascular:  Negative for chest pain. Respiratory:  Negative for cough, shortness of breath, snoring and wheezing. Musculoskeletal:  Positive for back pain, muscle weakness, myalgias and neck pain. Negative for falls, muscle cramps and stiffness. Gastrointestinal:  Negative for constipation, diarrhea and vomiting. Neurological:  Positive for dizziness and weakness. Negative for headaches and numbness. Physical Exam:  BP (!) 101/59   Pulse 56   Ht 5' 8\" (1.727 m)   Wt 148 lb (67.1 kg)   SpO2 95%   BMI 22.50 kg/m²     Physical Exam  Cardiovascular:      Rate and Rhythm: Normal rate. Pulmonary:      Effort: Pulmonary effort is normal.   Musculoskeletal:         General: Normal range of motion. Comments: Stooped posture - using walker      Skin:     General: Skin is warm and dry. Neurological:      Mental Status: He is alert and oriented to person, place, and time. Assessment:  Problem List Items Addressed This Visit       Spinal stenosis of lumbar region with neurogenic claudication (Chronic)    Lumbosacral spondylosis without myelopathy (Chronic)    Chronic use of opiate drug for therapeutic purpose          Treatment Plan:  Patient relates current medications are helping the pain.  Patient reports taking pain medications as prescribed, denies obtaining medications from different sources and denies use of illegal drugs. Patient denies side effects from medications like nausea, vomiting, constipation or drowsiness. Patient reports current activities of daily living are possible due to medications and would like to continue them. As always, we encourage daily stretching and strengthening exercises, and recommend minimizing use of pain medications unless patient cannot get through daily activities due to pain. Contract requirements met. Continue opioid therapy. Script not needed - pt to call for refill  Msg to MD mendez  MILD  Follow up appointment made for 8 weeks    I have reviewed the chief complaint and history of present illness (including ROS and [facility]) and vital documentation by my staff and I agree with their documentation and have added where applicable.

## 2022-09-30 DIAGNOSIS — M48.062 SPINAL STENOSIS OF LUMBAR REGION WITH NEUROGENIC CLAUDICATION: Primary | ICD-10-CM

## 2022-10-07 ENCOUNTER — TELEPHONE (OUTPATIENT)
Dept: PAIN MANAGEMENT | Age: 87
End: 2022-10-07

## 2022-10-07 NOTE — TELEPHONE ENCOUNTER
Pt would like a refill on his gabapentin last seen on 09/16/2022 next visit on 10/28/2022 please advise

## 2022-10-11 ENCOUNTER — HOSPITAL ENCOUNTER (OUTPATIENT)
Dept: PREADMISSION TESTING | Age: 87
Discharge: HOME | End: 2022-10-15
Payer: MEDICARE

## 2022-10-11 VITALS
WEIGHT: 148 LBS | SYSTOLIC BLOOD PRESSURE: 150 MMHG | TEMPERATURE: 97.1 F | HEART RATE: 57 BPM | RESPIRATION RATE: 20 BRPM | HEIGHT: 69 IN | OXYGEN SATURATION: 97 % | BODY MASS INDEX: 21.92 KG/M2 | DIASTOLIC BLOOD PRESSURE: 60 MMHG

## 2022-10-11 LAB
ANION GAP SERPL CALCULATED.3IONS-SCNC: 10 MMOL/L (ref 9–17)
BUN BLDV-MCNC: 17 MG/DL (ref 8–23)
CHLORIDE BLD-SCNC: 105 MMOL/L (ref 98–107)
CO2: 25 MMOL/L (ref 20–31)
CREAT SERPL-MCNC: 0.74 MG/DL (ref 0.7–1.2)
GFR SERPL CREATININE-BSD FRML MDRD: >60 ML/MIN/1.73M2
HCT VFR BLD CALC: 39.7 % (ref 40.7–50.3)
HEMOGLOBIN: 12.7 G/DL (ref 13–17)
MCH RBC QN AUTO: 31.4 PG (ref 25.2–33.5)
MCHC RBC AUTO-ENTMCNC: 32 G/DL (ref 28.4–34.8)
MCV RBC AUTO: 98 FL (ref 82.6–102.9)
NRBC AUTOMATED: 0 PER 100 WBC
PDW BLD-RTO: 12.8 % (ref 11.8–14.4)
PLATELET # BLD: 154 K/UL (ref 138–453)
PMV BLD AUTO: 11.2 FL (ref 8.1–13.5)
POTASSIUM SERPL-SCNC: 4.2 MMOL/L (ref 3.7–5.3)
RBC # BLD: 4.05 M/UL (ref 4.21–5.77)
SODIUM BLD-SCNC: 140 MMOL/L (ref 135–144)
WBC # BLD: 5 K/UL (ref 3.5–11.3)

## 2022-10-11 PROCEDURE — 36415 COLL VENOUS BLD VENIPUNCTURE: CPT

## 2022-10-11 PROCEDURE — 82565 ASSAY OF CREATININE: CPT

## 2022-10-11 PROCEDURE — 80051 ELECTROLYTE PANEL: CPT

## 2022-10-11 PROCEDURE — 93005 ELECTROCARDIOGRAM TRACING: CPT | Performed by: ANESTHESIOLOGY

## 2022-10-11 PROCEDURE — 84520 ASSAY OF UREA NITROGEN: CPT

## 2022-10-11 PROCEDURE — 85027 COMPLETE CBC AUTOMATED: CPT

## 2022-10-11 RX ORDER — HYDROCODONE BITARTRATE AND ACETAMINOPHEN 5; 325 MG/1; MG/1
1 TABLET ORAL EVERY 8 HOURS PRN
COMMUNITY

## 2022-10-11 RX ORDER — ASPIRIN 81 MG/1
81 TABLET ORAL DAILY
COMMUNITY

## 2022-10-11 RX ORDER — TERAZOSIN 10 MG/1
10 CAPSULE ORAL NIGHTLY
COMMUNITY

## 2022-10-11 ASSESSMENT — PAIN DESCRIPTION - ORIENTATION: ORIENTATION: LOWER

## 2022-10-11 ASSESSMENT — PAIN DESCRIPTION - DESCRIPTORS: DESCRIPTORS: ACHING;BURNING

## 2022-10-11 ASSESSMENT — PAIN SCALES - GENERAL: PAINLEVEL_OUTOF10: 8

## 2022-10-11 ASSESSMENT — PAIN DESCRIPTION - PAIN TYPE: TYPE: CHRONIC PAIN

## 2022-10-11 ASSESSMENT — PAIN DESCRIPTION - DIRECTION: RADIATING_TOWARDS: NUMBNESS IN RIGHT LEG

## 2022-10-11 ASSESSMENT — PAIN DESCRIPTION - LOCATION: LOCATION: BACK;LEG

## 2022-10-11 NOTE — PRE-PROCEDURE INSTRUCTIONS
[facility] Friday, [facility] at 12:30 PM    Once you enter the hospital lobby, take the elevators to the second floor. Check-In is at the surgery registration desk. If you have been given a blood band, you must bring it with you the day of surgery. Continue to take your home medications as you normally do up to and including the night before surgery with the exception of any blood thinning medications. Please stop any blood thinning medications as directed by your surgeon or prescribing physician. Failure to stop certain medications may interfere with your scheduled surgery. These may include:  Aspirin, Warfarin (Coumadin), Clopidogrel (Plavix), Ibuprofen (Motrin, Advil), Naproxen (Aleve), Meloxicam (Mobic), Celecoxib (Celebrex), Eliquis, Pradaxa, Xarelto, Effient, Fish Oil, Herbal supplements. Stop Advil 1 week prior to procedure- ask Dr Shakeel Morillo about baby aspirin. If you are diabetic, do not take any of your diabetic medications by mouth the morning of surgery. If you are taking insulin contact the doctor that manages your diabetes for instructions about any changes to your insulin dosages the day before surgery. Do not inject insulin or other injectable diabetic medications the morning of surgery unless otherwise instructed by the doctor who manages your diabetes. Please take the following medication(s) the day of surgery with a small sip of water:  Gabapentin and Proscar    Please use your inhalers at home the day of surgery. PREPARING FOR YOUR SURGERY:     Before surgery, you can play an important role in your own health. Because skin is not sterile, we need to be sure that your skin is as free of germs as possible before surgery by carefully washing before surgery. Preparing or prepping skin before surgery can reduce the risk of a surgical site infection.   Do not shave the area of your body where your surgery will be performed unless you received specific permission from your physician. You will need to shower at home the night before surgery and the morning of surgery with a special soap called chlorhexidine gluconate (CHG*). *Not to be used by people allergic to Chlorhexidine Gluconate (CHG). Following these instructions will help you be sure that your skin is clean before surgery. Instructions on cleaning your skin before surgery: The night before your surgery: You will need to shower with warm water (not hot) and the CHG soap. Use a clean wash cloth and a clean towel. Have clean clothes available to put on after the shower. First wash your hair with regular shampoo. Rinse your hair and body thoroughly to remove the shampoo. Wash your face and genital area (private parts) with your regular soap or water only. Thoroughly rinse your body with warm water from the neck down. Turn water off to prevent rinsing the soap off too soon. With a clean wet washcloth and half of the CHG soap in the bottle, lather your entire body from the neck down. Do not use CHG soap near your eyes or ears to avoid injury to those areas. Wash thoroughly, paying special attention to the area where your surgery will be performed. Wash your body gently for five (5) minutes. Avoid scrubbing your skin too hard. Turn the water back on and rinse your body thoroughly. Pat yourself dry with a clean, soft towel. Do not apply lotion, cream or powder. Dress with clean freshly washed clothes. The morning of surgery:    Repeat shower following steps above - using remaining half of CHG soap in bottle. Patient Instructions: If you are having any type of anesthesia you are to have nothing to eat or drink after midnight the night before your surgery.   This includes gum, hard candy, mints, water or smoking or chewing tobacco.  The only exception to this is a small sip of water to take with any morning dose of heart, blood pressure, or seizure medications. No alcoholic beverages for 24 hours prior to surgery. Brush your teeth but do not swallow water. Bring your eyeglasses and case with you. No contacts are to be worn the day of surgery. You also may bring your hearing aids. If you are on C-PAP or Bi-PAP at home and plan on staying in the hospital overnight for your surgery please bring the machine with you. Do not wear any jewelry or body piercings day of surgery. Also, NO lotion, perfume or deodorant to be used the day of surgery. No nail polish on the operative extremity (arm/leg surgeries)     If you are staying overnight with us, please bring a small bag of personal items. Please wear loose, comfortable clothing. If you are potentially going to have a cast or brace bring clothing that will fit over them. In case of illness - If you have cold or flu like symptoms (high fever, runny nose, sore throat, cough, etc.) rash, nausea, vomiting, loose stools, and/or recent contact with someone who has a contagious disease (chicken pox, measles, etc.) Please call your doctor before coming to the hospital.    If your child is having surgery please make arrangements for any other children to be cared for at home on the day of surgery. Other children are not permitted in recovery room and we want you to be able to spend time with the patient. If other arrangements are not available then we suggest that you have a second adult to stay in the waiting room. Day of Surgery/Procedure:    As a patient at [place] [id number] you can expect quality medical and nursing care that is centered on your individual needs. Our goal is to make your surgical experience as comfortable as possible    . Transportation After Your Surgery/Procedure: You will need a friend or family member to drive you home after your procedure. Your  must be 25years of age or older and able to sign off on your discharge instructions. A taxi cab or any other form of public transportation is not acceptable. Your friend or family member must stay at the hospital throughout your procedure. Someone must remain with you for the first 24 hours after your surgery if you receive anesthesia or medication. If you do not have someone to stay with you, your procedure may be cancelled.       If you have any other questions regarding your procedure or the day of surgery, please call [phone]      _________________________  ____________________________  Signature (Patient)              Signature (Provider) & date

## 2022-10-12 LAB
EKG ATRIAL RATE: 53 BPM
EKG P AXIS: 76 DEGREES
EKG P-R INTERVAL: 230 MS
EKG Q-T INTERVAL: 440 MS
EKG QRS DURATION: 120 MS
EKG QTC CALCULATION (BAZETT): 412 MS
EKG R AXIS: 51 DEGREES
EKG T AXIS: 65 DEGREES
EKG VENTRICULAR RATE: 53 BPM

## 2022-10-12 NOTE — FLOWSHEET NOTE
10/12/22 1427   Anesthesia PAT Clearance   Anesthesia Review Status Anes has reviewed patient for surgery  (Dr. Paige Flores reviewed whole chart and wants cardiac and medical clearance.  Juarez (surgery Scheduler) ariaed Roxana's office)

## 2022-10-28 ENCOUNTER — OFFICE VISIT (OUTPATIENT)
Dept: PAIN MANAGEMENT | Age: 87
End: 2022-10-28
Payer: MEDICARE

## 2022-10-28 VITALS
BODY MASS INDEX: 21.92 KG/M2 | HEIGHT: 69 IN | OXYGEN SATURATION: 98 % | SYSTOLIC BLOOD PRESSURE: 129 MMHG | DIASTOLIC BLOOD PRESSURE: 69 MMHG | HEART RATE: 51 BPM | WEIGHT: 148 LBS

## 2022-10-28 DIAGNOSIS — M48.062 SPINAL STENOSIS OF LUMBAR REGION WITH NEUROGENIC CLAUDICATION: Chronic | ICD-10-CM

## 2022-10-28 DIAGNOSIS — M47.817 LUMBOSACRAL SPONDYLOSIS WITHOUT MYELOPATHY: Chronic | ICD-10-CM

## 2022-10-28 DIAGNOSIS — Z79.891 CHRONIC USE OF OPIATE DRUG FOR THERAPEUTIC PURPOSE: ICD-10-CM

## 2022-10-28 PROCEDURE — G8484 FLU IMMUNIZE NO ADMIN: HCPCS | Performed by: NURSE PRACTITIONER

## 2022-10-28 PROCEDURE — G8420 CALC BMI NORM PARAMETERS: HCPCS | Performed by: NURSE PRACTITIONER

## 2022-10-28 PROCEDURE — 1036F TOBACCO NON-USER: CPT | Performed by: NURSE PRACTITIONER

## 2022-10-28 PROCEDURE — G8427 DOCREV CUR MEDS BY ELIG CLIN: HCPCS | Performed by: NURSE PRACTITIONER

## 2022-10-28 PROCEDURE — 99213 OFFICE O/P EST LOW 20 MIN: CPT | Performed by: NURSE PRACTITIONER

## 2022-10-28 PROCEDURE — 1123F ACP DISCUSS/DSCN MKR DOCD: CPT | Performed by: NURSE PRACTITIONER

## 2022-10-28 RX ORDER — GABAPENTIN 600 MG/1
600 TABLET ORAL 3 TIMES DAILY
Qty: 90 TABLET | Refills: 1 | Status: SHIPPED | OUTPATIENT
Start: 2022-10-28 | End: 2022-11-27

## 2022-10-28 RX ORDER — HYDROCODONE BITARTRATE AND ACETAMINOPHEN 5; 325 MG/1; MG/1
1 TABLET ORAL 2 TIMES DAILY PRN
Qty: 60 TABLET | Refills: 0 | Status: SHIPPED | OUTPATIENT
Start: 2022-10-28 | End: 2022-11-27

## 2022-10-28 ASSESSMENT — ENCOUNTER SYMPTOMS
CONSTIPATION: 0
COUGH: 0
BACK PAIN: 1
SHORTNESS OF BREATH: 0
DIARRHEA: 0
WHEEZING: 0

## 2022-10-28 NOTE — PROGRESS NOTES
Chief Complaint   Patient presents with    Back Pain    Medication Refill     Gabapentin, Norco       [facility]     19-year-old man here in office with his son in law  C/o with chronic back pain with radiation down both legs aggravated with standing and walking. Diagnosed with severe lumbar spinal stenosis and Dr Ben Andres suggested minimally invasive lumbar decompression  and pt wanted schedule but has since changed his mind - states too worried about his wife right now  He has tried epidural injection, medication and physical therapy with minimal relief  No loss of bladder or bowel control  Currently take Norco BID prn along with gabapentin with minimal relief  Reports no side effect from the medication    HPI:   Back Pain  This is a chronic problem. The current episode started more than 1 year ago. The problem occurs constantly. The problem is unchanged. The pain is present in the lumbar spine. The quality of the pain is described as aching, cramping and shooting. The pain radiates to the left foot and right foot. The pain is at a severity of 3/10. The pain is mild. The pain is Worse during the day. The symptoms are aggravated by bending, position and standing. Pertinent negatives include no chest pain or fever. Risk factors include poor posture, sedentary lifestyle and lack of exercise. He has tried analgesics and bed rest for the symptoms. The treatment provided mild relief.     Medication Refill: Gabapentin, Norco    Pain score Today:  7  Adverse effects (Constipation / Nausea / Sedation / sexual Dysfunction / others) : no  Mood: good  Sleep pattern and quality: poor  Activity level: fair    Pill count Today:3  Last dose taken  10/27/22 PM  OARRS report reviewed today: yes  ER/Hospitalizations/PCP visit related to pain since last visit:no   Any legal problems e.g. DUI etc.:No  Satisfied with current management: Yes    Opioid Contract:n/a  Last Urine Dug screen dated:06/20/2022    Hemoglobin A1C   Date Value Ref Range Status   07/23/2021 5.9 % Final       Past Medical History, Past Surgical History, Social History, Allergies and Medications reviewed and updated in EPIC as indicated    Family History reviewed and is noncontributory. Patient denies any new neurological symptoms. No bowel or bladder incontinence, no weakness, and no falling. Pill count: appropriate    Morphine equivalent: 10    Controlled Substance Monitoring:    Acute and Chronic Pain Monitoring:   RX Monitoring 10/28/2022   Periodic Controlled Substance Monitoring Possible medication side effects, risk of tolerance/dependence & alternative treatments discussed. ;No signs of potential drug abuse or diversion identified. ;Assessed functional status. ;Obtaining appropriate analgesic effect of treatment. Chronic Pain > 50 MEDD -          Periodic Controlled Substance Monitoring: Possible medication side effects, risk of tolerance/dependence & alternative treatments discussed., No signs of potential drug abuse or diversion identified. , Assessed functional status., Obtaining appropriate analgesic effect of treatment.  Thao Ackerman, APRN - CNP)      Past Medical History:   Diagnosis Date    Arrhythmia     Arthritis     BPH with obstruction/lower urinary tract symptoms     Caffeine use     3 coffee/day    Cardiac murmur     Cataracts, bilateral     Chronic back pain     Constipation     Enlarged prostate     Glaucoma     Hypertension     Prostatitis     TIA (transient ischemic attack)     Pt denies       Past Surgical History:   Procedure Laterality Date    CATARACT REMOVAL WITH IMPLANT Bilateral     EYE SURGERY      Strabismus    HERNIA REPAIR  2006    PAIN MANAGEMENT PROCEDURE Bilateral 11/12/2020    BILATERAL LUMBAR FACET STEROID INJECTION    L3-S1 performed by Annette Halsted, MD at [address] Bilateral 11/23/2020    BILATERAL LUMBAR FACET STEROID INJECTION L3-S1 performed by Annette Halsted, MD at [address] Bilateral 04/26/2021    RIGHT L3 AND LEFT L4 EPIDURAL STEROID INJECTION performed by Annette Halsted, MD at [facility]  07/20/2011    TONSILLECTOMY AND ADENOIDECTOMY         No Known Allergies      Current Outpatient Medications:     gabapentin (NEURONTIN) 600 MG tablet, Take 1 tablet by mouth 3 times daily for 30 days. , Disp: 90 tablet, Rfl: 1    HYDROcodone-acetaminophen (NORCO) 5-325 MG per tablet, Take 1 tablet by mouth 2 times daily as needed for Pain for up to 30 days. , Disp: 60 tablet, Rfl: 0    HYDROcodone-acetaminophen (NORCO) 5-325 MG per tablet, Take 1 tablet by mouth every 8 hours as needed for Pain., Disp: , Rfl:     terazosin (HYTRIN) 10 MG capsule, Take 10 mg by mouth nightly, Disp: , Rfl:     aspirin 81 MG EC tablet, Take 81 mg by mouth daily, Disp: , Rfl:     ibuprofen (ADVIL;MOTRIN) 200 MG tablet, Take 400 mg by mouth 2 times daily as needed for Pain, Disp: , Rfl:     metoprolol succinate (TOPROL XL) 25 MG extended release tablet, TAKE ONE TABLET BY MOUTH DAILY, Disp: 90 tablet, Rfl: 0    finasteride (PROSCAR) 5 MG tablet, Take 1 tablet by mouth daily, Disp: 30 tablet, Rfl: 0    acetaminophen (TYLENOL) 325 MG tablet, Take 650 mg by mouth every 6 hours as needed for Pain, Disp: , Rfl:     Multiple Vitamin (M.V.I. ADULT IV), Infuse 1 tablet intravenously daily, Disp: , Rfl:     Ascorbic Acid (VITAMIN C) 250 MG tablet, Take 250 mg by mouth daily, Disp: , Rfl:     Cholecalciferol (VITAMIN D) 50 MCG (2000 UT) CAPS capsule, Take 1 capsule by mouth daily, Disp: , Rfl:     Handicap Placard [facility], Handicap Placard valid for 5 years.  Expires 2026, Disp: 1 each, Rfl: 0    Family History   Problem Relation Age of Onset    Heart Disease Mother     Diabetes Mother     Stroke Father        Social History     Socioeconomic History    Marital status:      Spouse name: Not on file    Number of children: Not on file    Years of education: Not on file    Highest education level: Not on file Occupational History    Not on file   Tobacco Use    Smoking status: Former    Smokeless tobacco: Never    Tobacco comments:     quit 35 years ago    Vaping Use    Vaping Use: Never used   Substance and Sexual Activity    Alcohol use: Yes     Alcohol/week: 1.0 standard drink     Types: 1 Glasses of wine per week    Drug use: No    Sexual activity: Not on file   Other Topics Concern    Not on file   Social History Narrative    Not on file     Social Determinants of Health     Financial Resource Strain: Not on file   Food Insecurity: Not on file   Transportation Needs: Not on file   Physical Activity: Not on file   Stress: Not on file   Social Connections: Not on file   Intimate Partner Violence: Not on file   Housing Stability: Not on file       Review of Systems:  Review of Systems   Constitutional: Negative for chills and fever. Cardiovascular:  Negative for chest pain. Respiratory:  Negative for cough, shortness of breath and wheezing. Musculoskeletal:  Positive for back pain. Gastrointestinal:  Negative for constipation and diarrhea. Physical Exam:  /69   Pulse 51   Ht 5' 9\" (1.753 m)   Wt 148 lb (67.1 kg)   SpO2 98%   BMI 21.86 kg/m²     Physical Exam  Cardiovascular:      Rate and Rhythm: Normal rate. Pulmonary:      Effort: Pulmonary effort is normal.   Musculoskeletal:      Lumbar back: Decreased range of motion. Comments: Stooped posture - using scooter   Skin:     General: Skin is warm and dry. Neurological:      Mental Status: He is alert and oriented to person, place, and time.            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 Patient reports taking pain medications as prescribed, denies obtaining medications from different sources and denies use of illegal drugs. Patient denies side effects from medications like nausea, vomiting, constipation or drowsiness. Patient reports current activities of daily living are possible due to medications and would like to continue them. As always, we encourage daily stretching and strengthening exercises, and recommend minimizing use of pain medications unless patient cannot get through daily activities due to pain. Contract requirements met. Continue opioid therapy. Script written for norco  Pt's son at visit today with questions re MILD/LESI/ RFA for his father. Explained they have been offered and with little relief in past pt is not ready to repeat at this time  Follow up appointment made for 4 weeks    I have reviewed the chief complaint and history of present illness (including ROS and PFSH) and vital documentation by my staff and I agree with their documentation and have added where applicable.

## 2022-11-30 ENCOUNTER — HOSPITAL ENCOUNTER (OUTPATIENT)
Age: 87
Setting detail: SPECIMEN
Discharge: HOME | End: 2022-11-30

## 2022-12-01 DIAGNOSIS — N30.01 ACUTE CYSTITIS WITH HEMATURIA: ICD-10-CM

## 2022-12-02 LAB
CULTURE: ABNORMAL
SPECIMEN DESCRIPTION: ABNORMAL

## 2022-12-12 ENCOUNTER — HOSPITAL ENCOUNTER (OUTPATIENT)
Age: 87
Setting detail: SPECIMEN
Discharge: HOME | End: 2022-12-12

## 2022-12-13 DIAGNOSIS — N39.0 URINARY TRACT INFECTION WITHOUT HEMATURIA, SITE UNSPECIFIED: ICD-10-CM

## 2022-12-13 LAB
CULTURE: NORMAL
SPECIMEN DESCRIPTION: NORMAL

## 2022-12-16 ENCOUNTER — TELEPHONE (OUTPATIENT)
Dept: PAIN MANAGEMENT | Age: 87
End: 2022-12-16

## 2022-12-16 DIAGNOSIS — Z79.891 CHRONIC USE OF OPIATE DRUG FOR THERAPEUTIC PURPOSE: ICD-10-CM

## 2022-12-16 DIAGNOSIS — M47.817 LUMBOSACRAL SPONDYLOSIS WITHOUT MYELOPATHY: Primary | ICD-10-CM

## 2022-12-16 RX ORDER — HYDROCODONE BITARTRATE AND ACETAMINOPHEN 5; 325 MG/1; MG/1
1 TABLET ORAL EVERY 8 HOURS PRN
Qty: 42 TABLET | Refills: 0 | Status: SHIPPED | OUTPATIENT
Start: 2022-12-16 | End: 2022-12-30

## 2022-12-16 NOTE — TELEPHONE ENCOUNTER
Pt missed appt today said forgot and is out of town til 12/30 he did rs with  you for that day. Can he get a refill of norco til his appt? He will have daughter get for him.

## 2023-01-09 PROBLEM — N39.0 RECURRENT UTI: Status: ACTIVE | Noted: 2023-01-09

## 2023-01-09 PROBLEM — R33.8 OTHER RETENTION OF URINE: Status: ACTIVE | Noted: 2023-01-09

## 2023-01-20 ENCOUNTER — TELEPHONE (OUTPATIENT)
Dept: PAIN MANAGEMENT | Age: 88
End: 2023-01-20

## 2023-01-20 DIAGNOSIS — M47.817 LUMBOSACRAL SPONDYLOSIS WITHOUT MYELOPATHY: ICD-10-CM

## 2023-01-20 DIAGNOSIS — Z79.891 CHRONIC USE OF OPIATE DRUG FOR THERAPEUTIC PURPOSE: ICD-10-CM

## 2023-01-20 RX ORDER — HYDROCODONE BITARTRATE AND ACETAMINOPHEN 5; 325 MG/1; MG/1
1 TABLET ORAL EVERY 8 HOURS PRN
Qty: 30 TABLET | Refills: 0 | Status: SHIPPED | OUTPATIENT
Start: 2023-01-20 | End: 2023-01-30

## 2023-01-20 NOTE — TELEPHONE ENCOUNTER
Pt has been in hospital and just got home today and realized he doesn't have enough medications to get him to make an appt. Can he have a VV appt? Please advise?

## 2023-02-03 ENCOUNTER — TELEMEDICINE (OUTPATIENT)
Dept: PAIN MANAGEMENT | Age: 88
End: 2023-02-03
Payer: MEDICARE

## 2023-02-03 DIAGNOSIS — Z79.891 CHRONIC USE OF OPIATE DRUG FOR THERAPEUTIC PURPOSE: ICD-10-CM

## 2023-02-03 DIAGNOSIS — M48.062 SPINAL STENOSIS OF LUMBAR REGION WITH NEUROGENIC CLAUDICATION: Chronic | ICD-10-CM

## 2023-02-03 DIAGNOSIS — M47.817 LUMBOSACRAL SPONDYLOSIS WITHOUT MYELOPATHY: Chronic | ICD-10-CM

## 2023-02-03 PROCEDURE — G8484 FLU IMMUNIZE NO ADMIN: HCPCS | Performed by: NURSE PRACTITIONER

## 2023-02-03 PROCEDURE — G8420 CALC BMI NORM PARAMETERS: HCPCS | Performed by: NURSE PRACTITIONER

## 2023-02-03 PROCEDURE — 99213 OFFICE O/P EST LOW 20 MIN: CPT | Performed by: NURSE PRACTITIONER

## 2023-02-03 PROCEDURE — 1123F ACP DISCUSS/DSCN MKR DOCD: CPT | Performed by: NURSE PRACTITIONER

## 2023-02-03 PROCEDURE — 1036F TOBACCO NON-USER: CPT | Performed by: NURSE PRACTITIONER

## 2023-02-03 PROCEDURE — G8427 DOCREV CUR MEDS BY ELIG CLIN: HCPCS | Performed by: NURSE PRACTITIONER

## 2023-02-03 RX ORDER — GABAPENTIN 600 MG/1
600 TABLET ORAL 3 TIMES DAILY
Qty: 90 TABLET | Refills: 1 | Status: SHIPPED | OUTPATIENT
Start: 2023-02-03 | End: 2023-03-05

## 2023-02-03 RX ORDER — HYDROCODONE BITARTRATE AND ACETAMINOPHEN 5; 325 MG/1; MG/1
1 TABLET ORAL DAILY PRN
Qty: 30 TABLET | Refills: 0 | Status: SHIPPED | OUTPATIENT
Start: 2023-02-03 | End: 2023-03-05

## 2023-02-03 ASSESSMENT — ENCOUNTER SYMPTOMS
DIARRHEA: 0
NAUSEA: 0
BACK PAIN: 1
CONSTIPATION: 0
VOMITING: 0

## 2023-02-03 NOTE — PROGRESS NOTES
Chief Complaint   Patient presents with    Back Pain     Norco gabapentin     Leg Pain         PMH     80-year-old man here in office with his son in law  C/o with chronic back pain with radiation down both legs aggravated with standing and walking. Diagnosed with severe lumbar spinal stenosis  He has tried epidural injection, medication and physical therapy with minimal relief. Not interested in MILD  No loss of bladder or bowel control  Currently take Norco BID prn along with gabapentin with minimal relief but would like to try once daily dosing  Reports no side effect from the medication     Plans to have surgery for prostate once cardiac clearance      HPI:   Back Pain  This is a chronic problem. The current episode started more than 1 year ago. The problem occurs constantly. The problem is unchanged. The pain is present in the lumbar spine. The quality of the pain is described as aching, cramping and shooting. The pain radiates to the left foot and right foot. The pain is at a severity of 3/10. The pain is mild. The pain is Worse during the day. The symptoms are aggravated by bending, position and standing. Pertinent negatives include no chest pain or fever. Risk factors include poor posture, sedentary lifestyle and lack of exercise. He has tried analgesics and bed rest for the symptoms. The treatment provided mild relief.         Pain score Today:  6  Adverse effects (Constipation / Nausea / Sedation / sexual Dysfunction / others) : yes had to cut meds down do to this   Mood: good  Sleep pattern and quality: good  Activity level: fair    Pill count Today: Norco #   Last dose taken  02/03/2023  OARRS report reviewed today: yes  ER/Hospitalizations/PCP visit related to pain since last visit:no   Any legal problems e.g. DUI etc.:No  Satisfied with current management: Yes    Opioid Contract: NA  Last Urine Dug screen dated: 06/20/22    Hemoglobin A1C   Date Value Ref Range Status   07/23/2021 5.9 % Final Past Medical History, Past Surgical History, Social History, Allergies and Medications reviewed and updated in EPIC as indicated    Family History reviewed and is noncontributory. Patient denies any new neurological symptoms. No bowel or bladder incontinence, no weakness, and no falling. Pill count: appropriate    Morphine equivalent: 10    Controlled Substance Monitoring:    Acute and Chronic Pain Monitoring:   RX Monitoring 10/28/2022   Periodic Controlled Substance Monitoring Possible medication side effects, risk of tolerance/dependence & alternative treatments discussed. ;No signs of potential drug abuse or diversion identified. ;Assessed functional status. ;Obtaining appropriate analgesic effect of treatment. Chronic Pain > 50 MEDD -                 Past Medical History:   Diagnosis Date    Arrhythmia     Arthritis     BPH with obstruction/lower urinary tract symptoms     Caffeine use     3 coffee/day    Cardiac murmur     Cataracts, bilateral     Chronic back pain     Constipation     Enlarged prostate     Glaucoma     Hypertension     Prostatitis     TIA (transient ischemic attack)     Pt denies       Past Surgical History:   Procedure Laterality Date    CATARACT REMOVAL WITH IMPLANT Bilateral     EYE SURGERY      Strabismus    HERNIA REPAIR  2006    PAIN MANAGEMENT PROCEDURE Bilateral 11/12/2020    BILATERAL LUMBAR FACET STEROID INJECTION    L3-S1 performed by Mikal Rojas MD at [facility] Bilateral 11/23/2020    BILATERAL LUMBAR FACET STEROID INJECTION L3-S1 performed by Mikal Rojas MD at [facility] Bilateral 04/26/2021    RIGHT L3 AND LEFT L4 EPIDURAL STEROID INJECTION performed by Mikal Rojas MD at [place] [address]  07/20/2011    TONSILLECTOMY AND ADENOIDECTOMY         No Known Allergies      Current Outpatient Medications:     gabapentin (NEURONTIN) 600 MG tablet, Take 1 tablet by mouth 3 times daily for 30 days. , Disp: 90 tablet, Rfl: 1    HYDROcodone-acetaminophen (NORCO) 5-325 MG per tablet, Take 1 tablet by mouth daily as needed for Pain for up to 30 days. Max Daily Amount: 1 tablet, Disp: 30 tablet, Rfl: 0    sulfamethoxazole-trimethoprim (BACTRIM DS) 800-160 MG per tablet, Take 1 tablet by mouth 2 times daily, Disp: 6 tablet, Rfl: 0    bumetanide (BUMEX) 2 MG tablet, Take 0.5 tablets by mouth daily, Disp: 30 tablet, Rfl: 3    tamsulosin (FLOMAX) 0.4 MG capsule, Take 0.4 mg by mouth daily, Disp: , Rfl:     tamsulosin (FLOMAX) 0.4 MG capsule, Take 1 capsule by mouth daily, Disp: 90 capsule, Rfl: 3    terazosin (HYTRIN) 10 MG capsule, Take 10 mg by mouth nightly, Disp: , Rfl:     aspirin 81 MG EC tablet, Take 81 mg by mouth daily, Disp: , Rfl:     ibuprofen (ADVIL;MOTRIN) 200 MG tablet, Take 400 mg by mouth 2 times daily as needed for Pain, Disp: , Rfl:     metoprolol succinate (TOPROL XL) 25 MG extended release tablet, TAKE ONE TABLET BY MOUTH DAILY, Disp: 90 tablet, Rfl: 0    finasteride (PROSCAR) 5 MG tablet, Take 1 tablet by mouth daily, Disp: 30 tablet, Rfl: 0    acetaminophen (TYLENOL) 325 MG tablet, Take 650 mg by mouth every 6 hours as needed for Pain, Disp: , Rfl:     Multiple Vitamin (M.V.I. ADULT IV), Infuse 1 tablet intravenously daily, Disp: , Rfl:     Ascorbic Acid (VITAMIN C) 250 MG tablet, Take 250 mg by mouth daily, Disp: , Rfl:     Cholecalciferol (VITAMIN D) 50 MCG (2000 UT) CAPS capsule, Take 1 capsule by mouth daily, Disp: , Rfl:     Handicap Placard [facility], Handicap Placard valid for 5 years.  Expires 2026, Disp: 1 each, Rfl: 0    Family History   Problem Relation Age of Onset    Heart Disease Mother     Diabetes Mother     Stroke Father        Social History     Socioeconomic History    Marital status:      Spouse name: Not on file    Number of children: Not on file    Years of education: Not on file    Highest education level: Not on file   Occupational History    Not on file   Tobacco Use Smoking status: Former    Smokeless tobacco: Never    Tobacco comments:     quit 35 years ago    Vaping Use    Vaping Use: Never used   Substance and Sexual Activity    Alcohol use: Yes     Alcohol/week: 1.0 standard drink     Types: 1 Glasses of wine per week    Drug use: No    Sexual activity: Not on file   Other Topics Concern    Not on file   Social History Narrative    Not on file     Social Determinants of Health     Financial Resource Strain: Not on file   Food Insecurity: Not on file   Transportation Needs: Not on file   Physical Activity: Not on file   Stress: Not on file   Social Connections: Not on file   Intimate Partner Violence: Not on file   Housing Stability: Not on file       Review of Systems:  Review of Systems   Constitutional: Negative for chills, fever and malaise/fatigue. Musculoskeletal:  Positive for back pain. Negative for falls, muscle cramps, muscle weakness and stiffness. Gastrointestinal:  Negative for constipation, diarrhea, nausea and vomiting. Neurological:  Negative for dizziness, headaches and numbness. Physical Exam:  There were no vitals taken for this visit. Physical Exam        Assessment:  Problem List Items Addressed This Visit       Spinal stenosis of lumbar region with neurogenic claudication (Chronic)    Relevant Medications    gabapentin (NEURONTIN) 600 MG tablet    HYDROcodone-acetaminophen (NORCO) 5-325 MG per tablet    Lumbosacral spondylosis without myelopathy (Chronic)    Relevant Medications    gabapentin (NEURONTIN) 600 MG tablet    HYDROcodone-acetaminophen (NORCO) 5-325 MG per tablet    Chronic use of opiate drug for therapeutic purpose    Relevant Medications    HYDROcodone-acetaminophen (NORCO) 5-325 MG per tablet          Treatment Plan:  Patient relates current medications are helping the pain. Patient reports taking pain medications as prescribed, denies obtaining medications from different sources and denies use of illegal drugs.  Medication risk and benefits have been discussed. Patient denies side effects from medications like nausea, vomiting, constipation or drowsiness. Patient reports current activities of daily living are possible due to medications and would like to continue them.     As always, we encourage daily stretching and strengthening exercises, and recommend minimizing use of pain medications unless patient cannot get through daily activities due to pain.     Due to the high risk nature of this patient's pain medication close monitoring is required.   Continue current medication management, pt has been stable and compliant.  Script written for norco pt plans to try just once daily   Follow up appointment made for 8 weeks    I have reviewed the chief complaint and history of present illness (including ROS and PFSH) and vital documentation by my staff and I agree with their documentation and have added where applicable.     Victor Manuel Vanesa, was evaluated through a synchronous (real-time) audio encounter. The patient (or guardian if applicable) is aware that this is a billable service, which includes applicable co-pays. Pt unable to connect to video. This Virtual Visit was conducted with patient's (and/or legal guardian's) consent. The visit was conducted pursuant to the emergency declaration under the Rodrigues Act and the National Emergencies Act, 1135 waiver authority and the Coronavirus Preparedness and Response Supplemental Appropriations Act.  Patient identification was verified, and a caregiver was present when appropriate.   The patient was located at Home: [address] [place] [address],[address] [address]  Provider was located at Facility (Appt Dept): [address] [facility]  [address]  [place] [address]         Total time spent for this encounter: Not billed by time    --KHALIF Dudley CNP on 2/3/2023 at 2:38 PM    An electronic signature was used to authenticate this note.

## 2023-02-06 ENCOUNTER — HOSPITAL ENCOUNTER (OUTPATIENT)
Age: 88
Setting detail: SPECIMEN
Discharge: HOME | End: 2023-02-06

## 2023-02-06 DIAGNOSIS — E16.2 HYPOGLYCEMIA: ICD-10-CM

## 2023-02-06 DIAGNOSIS — R53.83 OTHER FATIGUE: ICD-10-CM

## 2023-02-06 DIAGNOSIS — E78.5 HYPERLIPIDEMIA, UNSPECIFIED HYPERLIPIDEMIA TYPE: ICD-10-CM

## 2023-02-06 DIAGNOSIS — E55.9 VITAMIN D DEFICIENCY: ICD-10-CM

## 2023-02-06 LAB
25(OH)D3 SERPL-MCNC: 115.2 NG/ML
ABSOLUTE EOS #: 0.12 K/UL (ref 0–0.44)
ABSOLUTE IMMATURE GRANULOCYTE: <0.03 K/UL (ref 0–0.3)
ABSOLUTE LYMPH #: 1.22 K/UL (ref 1.1–3.7)
ABSOLUTE MONO #: 0.32 K/UL (ref 0.1–1.2)
ALBUMIN SERPL-MCNC: 4.1 G/DL (ref 3.5–5.2)
ALBUMIN/GLOBULIN RATIO: 1.6 (ref 1–2.5)
ALP SERPL-CCNC: 148 U/L (ref 40–129)
ALT SERPL-CCNC: 314 U/L (ref 5–41)
ANION GAP SERPL CALCULATED.3IONS-SCNC: 15 MMOL/L (ref 9–17)
AST SERPL-CCNC: 160 U/L
BASOPHILS # BLD: 1 % (ref 0–2)
BASOPHILS ABSOLUTE: 0.05 K/UL (ref 0–0.2)
BILIRUB SERPL-MCNC: 0.7 MG/DL (ref 0.3–1.2)
BUN SERPL-MCNC: 11 MG/DL (ref 8–23)
CALCIUM SERPL-MCNC: 9.2 MG/DL (ref 8.6–10.4)
CHLORIDE SERPL-SCNC: 102 MMOL/L (ref 98–107)
CHOLEST SERPL-MCNC: 165 MG/DL
CHOLESTEROL/HDL RATIO: 2.1
CO2 SERPL-SCNC: 23 MMOL/L (ref 20–31)
CREAT SERPL-MCNC: 0.78 MG/DL (ref 0.7–1.2)
EOSINOPHILS RELATIVE PERCENT: 3 % (ref 1–4)
EST. AVERAGE GLUCOSE BLD GHB EST-MCNC: 103 MG/DL
GFR SERPL CREATININE-BSD FRML MDRD: >60 ML/MIN/1.73M2
GLUCOSE SERPL-MCNC: 93 MG/DL (ref 70–99)
HBA1C MFR BLD: 5.2 % (ref 4–6)
HCT VFR BLD AUTO: 38.8 % (ref 40.7–50.3)
HDLC SERPL-MCNC: 78 MG/DL
HGB BLD-MCNC: 12.5 G/DL (ref 13–17)
IMMATURE GRANULOCYTES: 0 %
LDLC SERPL CALC-MCNC: 72 MG/DL (ref 0–130)
LYMPHOCYTES # BLD: 31 % (ref 24–43)
MCH RBC QN AUTO: 31.6 PG (ref 25.2–33.5)
MCHC RBC AUTO-ENTMCNC: 32.2 G/DL (ref 28.4–34.8)
MCV RBC AUTO: 98 FL (ref 82.6–102.9)
MONOCYTES # BLD: 8 % (ref 3–12)
NRBC AUTOMATED: 0 PER 100 WBC
PDW BLD-RTO: 14.3 % (ref 11.8–14.4)
PLATELET # BLD AUTO: 184 K/UL (ref 138–453)
PMV BLD AUTO: 11.7 FL (ref 8.1–13.5)
POTASSIUM SERPL-SCNC: 4.4 MMOL/L (ref 3.7–5.3)
PROT SERPL-MCNC: 6.7 G/DL (ref 6.4–8.3)
RBC # BLD: 3.96 M/UL (ref 4.21–5.77)
SEG NEUTROPHILS: 57 % (ref 36–65)
SEGMENTED NEUTROPHILS ABSOLUTE COUNT: 2.23 K/UL (ref 1.5–8.1)
SODIUM SERPL-SCNC: 140 MMOL/L (ref 135–144)
TRIGL SERPL-MCNC: 75 MG/DL
TSH SERPL-ACNC: 1.14 UIU/ML (ref 0.3–5)
VIT B12 SERPL-MCNC: 939 PG/ML (ref 232–1245)
WBC # BLD AUTO: 4 K/UL (ref 3.5–11.3)

## 2023-02-21 RX ORDER — CALCIUM CARBONATE 300MG(750)
TABLET,CHEWABLE ORAL DAILY
COMMUNITY

## 2023-02-21 NOTE — PROGRESS NOTES
DAY OF SURGERY/PROCEDURE  GUIDELINES    As a patient at the [facility], you can expect quality medical and nursing care that is centered on your individual needs. It is our goal to make your surgical experience as comfortable and excellent as possible.  ________________________________________________________________________    The following instructions are general guidelines, if any information on this sheet is different from what your doctor has instructed you to do, please follow your doctor's instructions. Please arrive on 3/1 @ 900 am      Enter through entrance C. Check in at registration     Upon arrival you will be taken to the pre-operative area to get ready for surgery, your family will stay in the waiting room and visit with you once you are ready for surgery. Due to special limitations please limit visitation to 1-2 members of your family at a time. When it is time for surgery your family will return to the waiting room. Nothing to eat, drink, smoke, suck or chew after midnight (no water, gum, mints, cigarettes, cigars, pipes, snuff, chewing tobacco, etc.) or your surgery may be canceled. Take a shower or bath on the morning of your surgery/procedure (Hibiclens if directed) Do not apply any lotions. Brush your teeth, but do not swallow any water    IN CASE OF ILLNESS - If you have a cold or flu symptoms (high fever, runny nose, sore throat, cough, etc.) rash, nausea, vomiting, loose stools, and/or recent contact with someone who has a contagious disease (chick pox, measles, etc.) please call your doctor before coming to the surgery center    Take a small sip of water with heart, blood pressure, and/or seizure medication the morning of surgery.  Gabapentin    If applicable bring your:  Hearing aid(s)  Eyeglasses and Case (If you wear contacts they have to be removed before surgery, bring case and solution)    DO NOT take anticoagulants (blood thinners, aspirin or aspirin-containing products) as instructed by your physician. Leave all jewelry at home and wear loose, comfortable clothing that is easy to put on and take off. If you will be returning home the same day as your surgery, you will need to have a responsible adult (25years of age or older) present to drive you home. You will need someone stay with you at home for the first 24 hours following your surgery. This is due to the anesthesia and the medication given to you during surgery and recovery.

## 2023-02-22 ENCOUNTER — HOSPITAL ENCOUNTER (OUTPATIENT)
Dept: ULTRASOUND IMAGING | Facility: CLINIC | Age: 88
Discharge: HOME | End: 2023-02-24
Payer: MEDICARE

## 2023-02-22 DIAGNOSIS — R74.8 ELEVATED LIVER ENZYMES: ICD-10-CM

## 2023-02-22 PROCEDURE — 76705 ECHO EXAM OF ABDOMEN: CPT

## 2023-02-28 ENCOUNTER — ANESTHESIA EVENT (OUTPATIENT)
Dept: OPERATING ROOM | Age: 88
End: 2023-02-28
Payer: MEDICARE

## 2023-03-01 ENCOUNTER — HOSPITAL ENCOUNTER (OUTPATIENT)
Age: 88
Setting detail: OUTPATIENT SURGERY
Discharge: HOME | End: 2023-03-01
Attending: SPECIALIST | Admitting: SPECIALIST
Payer: MEDICARE

## 2023-03-01 ENCOUNTER — ANESTHESIA (OUTPATIENT)
Dept: OPERATING ROOM | Age: 88
End: 2023-03-01
Payer: MEDICARE

## 2023-03-01 VITALS
OXYGEN SATURATION: 96 % | SYSTOLIC BLOOD PRESSURE: 160 MMHG | DIASTOLIC BLOOD PRESSURE: 72 MMHG | HEART RATE: 62 BPM | TEMPERATURE: 96.9 F | BODY MASS INDEX: 21.48 KG/M2 | RESPIRATION RATE: 14 BRPM | HEIGHT: 69 IN | WEIGHT: 145 LBS

## 2023-03-01 PROCEDURE — 3600000012 HC SURGERY LEVEL 2 ADDTL 15MIN: Performed by: SPECIALIST

## 2023-03-01 PROCEDURE — 6360000002 HC RX W HCPCS: Performed by: SPECIALIST

## 2023-03-01 PROCEDURE — 3700000000 HC ANESTHESIA ATTENDED CARE: Performed by: SPECIALIST

## 2023-03-01 PROCEDURE — 7100000011 HC PHASE II RECOVERY - ADDTL 15 MIN: Performed by: SPECIALIST

## 2023-03-01 PROCEDURE — 2580000003 HC RX 258: Performed by: ANESTHESIOLOGY

## 2023-03-01 PROCEDURE — 3600000002 HC SURGERY LEVEL 2 BASE: Performed by: SPECIALIST

## 2023-03-01 PROCEDURE — 7100000001 HC PACU RECOVERY - ADDTL 15 MIN: Performed by: SPECIALIST

## 2023-03-01 PROCEDURE — 6360000002 HC RX W HCPCS: Performed by: ANESTHESIOLOGY

## 2023-03-01 PROCEDURE — 3700000001 HC ADD 15 MINUTES (ANESTHESIA): Performed by: SPECIALIST

## 2023-03-01 PROCEDURE — 2709999900 HC NON-CHARGEABLE SUPPLY: Performed by: SPECIALIST

## 2023-03-01 PROCEDURE — 2580000003 HC RX 258: Performed by: SPECIALIST

## 2023-03-01 PROCEDURE — 7100000010 HC PHASE II RECOVERY - FIRST 15 MIN: Performed by: SPECIALIST

## 2023-03-01 PROCEDURE — 2500000003 HC RX 250 WO HCPCS: Performed by: ANESTHESIOLOGY

## 2023-03-01 PROCEDURE — 7100000000 HC PACU RECOVERY - FIRST 15 MIN: Performed by: SPECIALIST

## 2023-03-01 RX ORDER — ONDANSETRON 2 MG/ML
INJECTION INTRAMUSCULAR; INTRAVENOUS PRN
Status: DISCONTINUED | OUTPATIENT
Start: 2023-03-01 | End: 2023-03-01 | Stop reason: SDUPTHER

## 2023-03-01 RX ORDER — OXYCODONE HYDROCHLORIDE 5 MG/1
5 TABLET ORAL PRN
Status: DISCONTINUED | OUTPATIENT
Start: 2023-03-01 | End: 2023-03-01 | Stop reason: HOSPADM

## 2023-03-01 RX ORDER — SODIUM CHLORIDE 0.9 % (FLUSH) 0.9 %
5-40 SYRINGE (ML) INJECTION EVERY 12 HOURS SCHEDULED
Status: DISCONTINUED | OUTPATIENT
Start: 2023-03-01 | End: 2023-03-01 | Stop reason: HOSPADM

## 2023-03-01 RX ORDER — SODIUM CHLORIDE 9 MG/ML
25 INJECTION, SOLUTION INTRAVENOUS PRN
Status: DISCONTINUED | OUTPATIENT
Start: 2023-03-01 | End: 2023-03-01 | Stop reason: HOSPADM

## 2023-03-01 RX ORDER — DEXAMETHASONE SODIUM PHOSPHATE 10 MG/ML
INJECTION, SOLUTION INTRAMUSCULAR; INTRAVENOUS PRN
Status: DISCONTINUED | OUTPATIENT
Start: 2023-03-01 | End: 2023-03-01 | Stop reason: SDUPTHER

## 2023-03-01 RX ORDER — SODIUM CHLORIDE, SODIUM LACTATE, POTASSIUM CHLORIDE, CALCIUM CHLORIDE 600; 310; 30; 20 MG/100ML; MG/100ML; MG/100ML; MG/100ML
INJECTION, SOLUTION INTRAVENOUS CONTINUOUS PRN
Status: DISCONTINUED | OUTPATIENT
Start: 2023-03-01 | End: 2023-03-01 | Stop reason: SDUPTHER

## 2023-03-01 RX ORDER — LIDOCAINE HYDROCHLORIDE 10 MG/ML
1 INJECTION, SOLUTION EPIDURAL; INFILTRATION; INTRACAUDAL; PERINEURAL
Status: DISCONTINUED | OUTPATIENT
Start: 2023-03-01 | End: 2023-03-01 | Stop reason: HOSPADM

## 2023-03-01 RX ORDER — SODIUM CHLORIDE 0.9 % (FLUSH) 0.9 %
5-40 SYRINGE (ML) INJECTION PRN
Status: DISCONTINUED | OUTPATIENT
Start: 2023-03-01 | End: 2023-03-01 | Stop reason: HOSPADM

## 2023-03-01 RX ORDER — MORPHINE SULFATE 2 MG/ML
1 INJECTION, SOLUTION INTRAMUSCULAR; INTRAVENOUS EVERY 5 MIN PRN
Status: DISCONTINUED | OUTPATIENT
Start: 2023-03-01 | End: 2023-03-01 | Stop reason: HOSPADM

## 2023-03-01 RX ORDER — PROPOFOL 10 MG/ML
INJECTION, EMULSION INTRAVENOUS PRN
Status: DISCONTINUED | OUTPATIENT
Start: 2023-03-01 | End: 2023-03-01 | Stop reason: SDUPTHER

## 2023-03-01 RX ORDER — HYDRALAZINE HYDROCHLORIDE 20 MG/ML
10 INJECTION INTRAMUSCULAR; INTRAVENOUS
Status: DISCONTINUED | OUTPATIENT
Start: 2023-03-01 | End: 2023-03-01 | Stop reason: HOSPADM

## 2023-03-01 RX ORDER — CEFAZOLIN 2 G/1
INJECTION, POWDER, FOR SOLUTION INTRAMUSCULAR; INTRAVENOUS
Status: DISCONTINUED
Start: 2023-03-01 | End: 2023-03-01 | Stop reason: HOSPADM

## 2023-03-01 RX ORDER — DIPHENHYDRAMINE HYDROCHLORIDE 50 MG/ML
12.5 INJECTION INTRAMUSCULAR; INTRAVENOUS
Status: DISCONTINUED | OUTPATIENT
Start: 2023-03-01 | End: 2023-03-01 | Stop reason: HOSPADM

## 2023-03-01 RX ORDER — KETOROLAC TROMETHAMINE 30 MG/ML
INJECTION, SOLUTION INTRAMUSCULAR; INTRAVENOUS PRN
Status: DISCONTINUED | OUTPATIENT
Start: 2023-03-01 | End: 2023-03-01 | Stop reason: SDUPTHER

## 2023-03-01 RX ORDER — SODIUM CHLORIDE, SODIUM LACTATE, POTASSIUM CHLORIDE, CALCIUM CHLORIDE 600; 310; 30; 20 MG/100ML; MG/100ML; MG/100ML; MG/100ML
INJECTION, SOLUTION INTRAVENOUS CONTINUOUS
Status: DISCONTINUED | OUTPATIENT
Start: 2023-03-01 | End: 2023-03-01 | Stop reason: HOSPADM

## 2023-03-01 RX ORDER — FENTANYL CITRATE 50 UG/ML
INJECTION, SOLUTION INTRAMUSCULAR; INTRAVENOUS PRN
Status: DISCONTINUED | OUTPATIENT
Start: 2023-03-01 | End: 2023-03-01 | Stop reason: SDUPTHER

## 2023-03-01 RX ORDER — SODIUM CHLORIDE 9 MG/ML
INJECTION, SOLUTION INTRAVENOUS PRN
Status: DISCONTINUED | OUTPATIENT
Start: 2023-03-01 | End: 2023-03-01 | Stop reason: HOSPADM

## 2023-03-01 RX ORDER — ONDANSETRON 2 MG/ML
4 INJECTION INTRAMUSCULAR; INTRAVENOUS
Status: DISCONTINUED | OUTPATIENT
Start: 2023-03-01 | End: 2023-03-01 | Stop reason: HOSPADM

## 2023-03-01 RX ORDER — METOCLOPRAMIDE HYDROCHLORIDE 5 MG/ML
10 INJECTION INTRAMUSCULAR; INTRAVENOUS
Status: DISCONTINUED | OUTPATIENT
Start: 2023-03-01 | End: 2023-03-01 | Stop reason: HOSPADM

## 2023-03-01 RX ORDER — LIDOCAINE HYDROCHLORIDE 10 MG/ML
INJECTION, SOLUTION INFILTRATION; PERINEURAL PRN
Status: DISCONTINUED | OUTPATIENT
Start: 2023-03-01 | End: 2023-03-01 | Stop reason: SDUPTHER

## 2023-03-01 RX ORDER — OXYCODONE HYDROCHLORIDE 5 MG/1
10 TABLET ORAL PRN
Status: DISCONTINUED | OUTPATIENT
Start: 2023-03-01 | End: 2023-03-01 | Stop reason: HOSPADM

## 2023-03-01 RX ORDER — MIDAZOLAM HYDROCHLORIDE 1 MG/ML
INJECTION INTRAMUSCULAR; INTRAVENOUS PRN
Status: DISCONTINUED | OUTPATIENT
Start: 2023-03-01 | End: 2023-03-01 | Stop reason: SDUPTHER

## 2023-03-01 RX ORDER — GLYCOPYRROLATE 0.2 MG/ML
INJECTION INTRAMUSCULAR; INTRAVENOUS PRN
Status: DISCONTINUED | OUTPATIENT
Start: 2023-03-01 | End: 2023-03-01 | Stop reason: SDUPTHER

## 2023-03-01 RX ORDER — PHENAZOPYRIDINE HYDROCHLORIDE 200 MG/1
200 TABLET, FILM COATED ORAL 3 TIMES DAILY PRN
Qty: 15 TABLET | Refills: 0 | Status: SHIPPED | OUTPATIENT
Start: 2023-03-01

## 2023-03-01 RX ORDER — CEPHALEXIN 500 MG/1
500 CAPSULE ORAL 3 TIMES DAILY
Qty: 15 CAPSULE | Refills: 0 | Status: SHIPPED | OUTPATIENT
Start: 2023-03-01

## 2023-03-01 RX ADMIN — SODIUM CHLORIDE, POTASSIUM CHLORIDE, SODIUM LACTATE AND CALCIUM CHLORIDE: 600; 310; 30; 20 INJECTION, SOLUTION INTRAVENOUS at 09:58

## 2023-03-01 RX ADMIN — DEXAMETHASONE SODIUM PHOSPHATE 8 MG: 10 INJECTION INTRAMUSCULAR; INTRAVENOUS at 10:46

## 2023-03-01 RX ADMIN — MIDAZOLAM 2 MG: 1 INJECTION INTRAMUSCULAR; INTRAVENOUS at 10:41

## 2023-03-01 RX ADMIN — PROPOFOL 120 MG: 10 INJECTION, EMULSION INTRAVENOUS at 10:46

## 2023-03-01 RX ADMIN — CEFAZOLIN 2000 MG: 2 INJECTION, POWDER, FOR SOLUTION INTRAMUSCULAR; INTRAVENOUS at 10:41

## 2023-03-01 RX ADMIN — GLYCOPYRROLATE 0.4 MG: 0.2 INJECTION INTRAMUSCULAR; INTRAVENOUS at 11:05

## 2023-03-01 RX ADMIN — KETOROLAC TROMETHAMINE 30 MG: 30 INJECTION, SOLUTION INTRAMUSCULAR; INTRAVENOUS at 11:36

## 2023-03-01 RX ADMIN — ONDANSETRON 4 MG: 2 INJECTION INTRAMUSCULAR; INTRAVENOUS at 11:36

## 2023-03-01 RX ADMIN — FENTANYL CITRATE 100 MCG: 50 INJECTION, SOLUTION INTRAMUSCULAR; INTRAVENOUS at 10:41

## 2023-03-01 RX ADMIN — SODIUM CHLORIDE, POTASSIUM CHLORIDE, SODIUM LACTATE AND CALCIUM CHLORIDE: 600; 310; 30; 20 INJECTION, SOLUTION INTRAVENOUS at 10:41

## 2023-03-01 RX ADMIN — LIDOCAINE HYDROCHLORIDE 40 MG: 10 INJECTION, SOLUTION INFILTRATION; PERINEURAL at 10:46

## 2023-03-01 ASSESSMENT — PAIN - FUNCTIONAL ASSESSMENT: PAIN_FUNCTIONAL_ASSESSMENT: NONE - DENIES PAIN

## 2023-03-01 NOTE — ANESTHESIA PRE PROCEDURE
Department of Anesthesiology  Preprocedure Note       Name:  Wilma Jackson   Age:  80 y.o.  :  1929                                          MRN:  [id number]         Date:  3/1/2023      Surgeon: Jason Suarez):  Yury Cummings MD    Procedure: Procedure(s):  CYSTOSCOPY TRANSURETHRAL RESECTION PROSTATE GREENLIGHT LASER    Medications prior to admission:   Prior to Admission medications    Medication Sig Start Date End Date Taking? Authorizing Provider   Cyanocobalamin (VITAMIN B-12 CR PO) Take by mouth daily   Yes Historical Provider, MD   Magnesium 400 MG TABS Take by mouth daily   Yes Historical Provider, MD   gabapentin (NEURONTIN) 600 MG tablet Take 1 tablet by mouth 3 times daily for 30 days. 2/3/23 3/5/23  KHALIF Alvarado - CNP   bumetanide (BUMEX) 2 MG tablet Take 0.5 tablets by mouth daily 1/10/23   Jose Mayfield MD   tamsulosin [facility]) 0.4 MG capsule Take 1 capsule by mouth daily 23   Yury Cummings MD   terazosin (HYTRIN) 10 MG capsule Take 10 mg by mouth nightly    Historical Provider, MD   aspirin 81 MG EC tablet Take 81 mg by mouth daily    Historical Provider, MD   metoprolol succinate (TOPROL XL) 25 MG extended release tablet TAKE ONE TABLET BY MOUTH DAILY 21   Jose Mayfield MD   finasteride (PROSCAR) 5 MG tablet Take 1 tablet by mouth daily 21   Jose Mayfield MD   Multiple Vitamin (M.V.I. ADULT IV) Infuse 1 tablet intravenously daily    Historical Provider, MD   Ascorbic Acid (VITAMIN C) 250 MG tablet Take 250 mg by mouth daily    Historical Provider, MD   Handicap Placard [facility] Handicap Placard valid for 5 years. Expires 20   Jose Mayfield MD       Current medications:    No current facility-administered medications for this encounter.        Allergies:  No Known Allergies    Problem List:    Patient Active Problem List   Diagnosis Code    Edema R60.9    Low back pain M54.50    Asthmatic bronchitis without complication J60.199    Spinal stenosis of lumbar region with neurogenic claudication M48.062    Lumbosacral spondylosis without myelopathy M47.817    Abnormal MRI, lumbar spine R93.7    Chronic use of opiate drug for therapeutic purpose Z79.891    Neck pain, chronic M54.2, G89.29    BPH with obstruction/lower urinary tract symptoms N40.1, N13.8    Nocturia R35.1    Frequency of micturition R35.0    Recurrent UTI N39.0    Other retention of urine R33.8       Past Medical History:        Diagnosis Date    Anterolisthesis     degenerative- lumbar    Arrhythmia     Arthritis     spinal stenosis    BPH with obstruction/lower urinary tract symptoms     Caffeine use     3 coffee/day    Cardiac murmur     Cataracts, bilateral     Chronic back pain     Compression fracture of body of thoracic vertebra (HCC) 12/2022    T12    Constipation     Enlarged prostate     Glaucoma     Hypertension     Prostatitis     TIA (transient ischemic attack)     Pt denies       Past Surgical History:        Procedure Laterality Date    CATARACT REMOVAL WITH IMPLANT Bilateral     COLONOSCOPY      EYE SURGERY      Strabismus    HERNIA REPAIR  2006    PAIN MANAGEMENT PROCEDURE Bilateral 11/12/2020    BILATERAL LUMBAR FACET STEROID INJECTION    L3-S1 performed by Evan Melvin MD at [address] [place] [address] Bilateral 11/23/2020    BILATERAL LUMBAR FACET STEROID INJECTION L3-S1 performed by Evan Melvin MD at [address] [place] [address] Bilateral 04/26/2021    RIGHT L3 AND LEFT L4 EPIDURAL STEROID INJECTION performed by Evan Melvin MD at [address] [place] [address]  07/20/2011    TONSILLECTOMY AND ADENOIDECTOMY         Social History:    Social History     Tobacco Use    Smoking status: Former    Smokeless tobacco: Never    Tobacco comments:     quit 35 years ago    Substance Use Topics    Alcohol use:  Yes     Alcohol/week: 1.0 standard drink     Types: 1 Glasses of wine per week     Comment: social Counseling given: Not Answered  Tobacco comments: quit 35 years ago       Vital Signs (Current):   Vitals:    02/21/23 1030 03/01/23 0921   BP:  (!) 159/72   Pulse:  72   Resp:  15   Temp:  97.8 °F (36.6 °C)   TempSrc:  Infrared   SpO2:  100%   Weight: 148 lb (67.1 kg) 145 lb (65.8 kg)   Height: 5' 9\" (1.753 m) 5' 9\" (1.753 m)                                              BP Readings from Last 3 Encounters:   03/01/23 (!) 159/72   02/16/23 110/62   02/02/23 (!) 108/52       NPO Status: Time of last liquid consumption: 2000                        Time of last solid consumption: 2030                        Date of last liquid consumption: 02/28/23                        Date of last solid food consumption: 02/28/23    BMI:   Wt Readings from Last 3 Encounters:   03/01/23 145 lb (65.8 kg)   02/16/23 148 lb (67.1 kg)   02/02/23 150 lb (68 kg)     Body mass index is 21.41 kg/m². CBC:   Lab Results   Component Value Date/Time    WBC 4.0 02/06/2023 09:05 AM    RBC 3.96 02/06/2023 09:05 AM    HGB 12.5 02/06/2023 09:05 AM    HCT 38.8 02/06/2023 09:05 AM    MCV 98.0 02/06/2023 09:05 AM    RDW 14.3 02/06/2023 09:05 AM     02/06/2023 09:05 AM       CMP:   Lab Results   Component Value Date/Time     02/06/2023 09:05 AM    K 4.4 02/06/2023 09:05 AM     02/06/2023 09:05 AM    CO2 23 02/06/2023 09:05 AM    BUN 11 02/06/2023 09:05 AM    CREATININE 0.78 02/06/2023 09:05 AM    GFRAA 114.3 07/23/2021 12:00 AM    LABGLOM >60 02/06/2023 09:05 AM    PROT 6.7 02/06/2023 09:05 AM    CALCIUM 9.2 02/06/2023 09:05 AM    BILITOT 0.7 02/06/2023 09:05 AM    ALKPHOS 148 02/06/2023 09:05 AM     02/06/2023 09:05 AM     02/06/2023 09:05 AM       POC Tests: No results for input(s): POCGLU, POCNA, POCK, POCCL, POCBUN, POCHEMO, POCHCT in the last 72 hours.     Coags: No results found for: PROTIME, INR, APTT    HCG (If Applicable): No results found for: PREGTESTUR, PREGSERUM, HCG, HCGQUANT     ABGs: No results found for: PHART, PO2ART, ERY4IFG, JCG6HQW, BEART, U8GJPDGA     Type & Screen (If Applicable):  No results found for: LABABO, LABRH    Drug/Infectious Status (If Applicable):  No results found for: HIV, HEPCAB    COVID-19 Screening (If Applicable): No results found for: COVID19        Anesthesia Evaluation  Patient summary reviewed and Nursing notes reviewed no history of anesthetic complications:   Airway: Mallampati: II  TM distance: >3 FB   Neck ROM: full  Mouth opening: > = 3 FB   Dental:    (+) upper dentures      Pulmonary:normal exam  breath sounds clear to auscultation  (+) asthma:                            Cardiovascular:  Exercise tolerance: no interval change,   (+) hypertension: no interval change,       ECG reviewed  Rhythm: regular  Rate: normal                    Neuro/Psych:   (+) TIA,              ROS comment: Spinal stenosis of lumbar region with neurogenic claudication  Neck pain, chronic GI/Hepatic/Renal:            ROS comment: BPH with obstruction. Endo/Other:    (+) : arthritis: OA and no interval change. , .                  ROS comment: Compression fracture of body of thoracic vertebra  Abdominal:       Abdomen: soft. Vascular: negative vascular ROS. Other Findings:           Anesthesia Plan      general     ASA 3       Induction: intravenous. Anesthetic plan and risks discussed with patient. Plan discussed with CRNA.                     Neha Meneses MD   3/1/2023

## 2023-03-01 NOTE — DISCHARGE INSTRUCTIONS
Run remaining irrigation via 3 way hernández catheter in recovery room. Cap off irrigation port and hook catheter up to gravity drainage. Teach hernández catheter care and send home with leg bag and overnight  bag. Patient  to call Dara Villarreal M.D.'s office to set up a voiding trial and removal of his indwelling hernández catheter tomorrow. APPT time is 1010am  Encourage patient to drink at least 64 ounces of water a day. Reassure patient it is common to see gross hematuria and this can last several days to weeks. No driving for 24 hours. No lifting more then 15-20 pounds for 3 weeks. Use an OTC stool softener as needed for constipation and avoid straining during bowel movements. Patient should continue taking his prostate medication until the first postoperative appointment. Call office if fever > 101, shaking chills, inability to void after hernández catheter removal or severe pain. See Rx for antibiotics. Patient may use Pyridium 200 mg po tid prn dysuria and/or bladder pain. Do not resume ASA for 2 weeks AND at least 48 hours without visible gross hematuria. Activity  You have had anesthesia today  Do not drive, operate heavy equipment, consume alcoholic beverages, or make any important decisions  for 24 hours   If you are taking pain medication: Do not drive or consume alcohol. Take your time changing positions today. You may feel light headed or dizzy if you move too quickly. Continue your home medications as ordered by your physician. Diet   You can eat your normal diet when you feel well. You should start off with bland foods like chicken soup, toast, or yogurt. Then advance as tolerated. Drink plenty of fluids (unless your doctor tells you not to). Your urine should be very lightly colored without a strong odor.

## 2023-03-01 NOTE — ANESTHESIA POSTPROCEDURE EVALUATION
POST- ANESTHESIA EVALUATION       Pt Name: Sandra Rosado  MRN: [id number]  [place]: 4/17/1929  Date of evaluation: 3/1/2023  Time:  3:03 PM      BP (!) 160/72   Pulse 62   Temp 96.9 °F (36.1 °C) (Infrared)   Resp 14   Ht 5' 9\" (1.753 m)   Wt 145 lb (65.8 kg)   SpO2 96%   BMI 21.41 kg/m²      Consciousness Level  Awake  Cardiopulmonary Status  Stable  Pain Adequately Treated YES  Nausea / Vomiting  NO  Adequate Hydration  YES  Anesthesia Related Complications NONE      Electronically signed by Saint Ming, MD on 3/1/2023 at 3:03 PM       Department of Anesthesiology  Postprocedure Note    Patient: Sandra Rosado  MRN: [id number]  [place]: 4/17/1929  Date of evaluation: 3/1/2023      Procedure Summary     Date: 03/01/23 Room / Location: [address]    Anesthesia Start: 1987 Anesthesia Stop: 4059    Procedure: CYSTOSCOPY TRANSURETHRAL RESECTION PROSTATE GREENLIGHT LASER Diagnosis:       BPH with obstruction/lower urinary tract symptoms      (BPH with obstruction/lower urinary tract symptoms [N40.1, N13.8])    Surgeons: Alta Elena MD Responsible Provider: Coye Epley, MD    Anesthesia Type: general ASA Status: 3          Anesthesia Type: No value filed.     Clay Phase I: Clay Score: 9    Clay Phase II: Clay Score: 10      Anesthesia Post Evaluation

## 2023-03-01 NOTE — OP NOTE
Operative Note      Patient: Luli Pena  YOB: 1929  MRN: [id number]    Date of Procedure: 3/1/2023    Pre-Op Diagnosis: BPH with obstruction/lower urinary tract symptoms [N40.1, N13.8]    Post-Op Diagnosis: Same       Procedure:  Greenlight laser TURP     Surgeon(s):  Edwin Byrd MD    Assistant:   * No surgical staff found *    Anesthesia: General    Estimated Blood Loss (mL): Minimal    Complications: None    Specimens:   * No specimens in log *    Implants:  * No implants in log *      Drains: * No LDAs found *    Findings: see below     Detailed Description of Procedure:   Romel Lemaer:  This is a 80 y.o. male presents today for GreenLight photovaporization of the prostate. After risks, benefits and alternatives of the procedure were discussed with the patient, informed consent was obtained and the patient elected to proceed. The patient was given Ancef 2 gm IV on call to OR for antibiotic prophylaxis. Patient had EPC cuffs placed for VTE prophylaxis. DETAILS OF PROCEDURE:  General anesthesia was induced and the patient was placed in the dorsal lithotomy position. A time-out was performed to confirm patient identity and procedure. He was then prepped and draped in the usual sterile fashion. A continuous flow sheath with obturator and lens was inserted through the patient's urethra and into the bladder. Upon entering the bladder, both ureteral orifices were located and found to be a safe distance from the vesical neck. The GreenLight fiber was then inserted after the obturator was removed and the working bridge was placed through the continous flow sheath. GreenLight photovaporization was initiated beginning at the 6 o'clock position and a groove was made starting at the bladder neck and extending back to the verumontum. After this, the patient's lateral lobe BPH was addressed.   The adenomatous tissue was vaporized down to the level of the capsule, starting first on the right then on the left. The adenomatous tissue anteriorly was then removed in a similar fashion. Finally we turned our attention to the apical tissue taking care not to extend beyond the verumontanum. The bladder neck was then incised with the laser. At this time the irrigation was turned off and the prostatic urethra appeared to be wide open and no longer obstructed. Hemostasis was achieved and persistent. Both ureteral orifices were noted to be uninvolved in the resection. There was no scatter of laser fiber into the bladder. The patient was treated with  mayes of power for a laser time of 20:59 minutes and a total of 160,808 Joules. The scope was removed and a 20-Chilean 3-way Yee catheter was inserted at the end of the case. Continuous bladder irrigation with normal saline was then initiated and 3 L of normal saline were allowed to infuse in PACU before the catheter was clamped. The patient was taken to recovery in good condition. PLAN:  The patient will be discharged home from the hospital per the PACU team. He will follow up in the office for a fill and spill voiding trial tomorrow.     Electronically signed by Marely Feliz MD on 3/1/2023 at 10:01 AM

## 2023-04-03 ENCOUNTER — OFFICE VISIT (OUTPATIENT)
Dept: PAIN MANAGEMENT | Age: 88
End: 2023-04-03
Payer: MEDICARE

## 2023-04-03 VITALS
SYSTOLIC BLOOD PRESSURE: 130 MMHG | BODY MASS INDEX: 21.48 KG/M2 | DIASTOLIC BLOOD PRESSURE: 70 MMHG | WEIGHT: 145 LBS | HEART RATE: 72 BPM | OXYGEN SATURATION: 97 % | HEIGHT: 69 IN

## 2023-04-03 DIAGNOSIS — M48.062 SPINAL STENOSIS OF LUMBAR REGION WITH NEUROGENIC CLAUDICATION: Chronic | ICD-10-CM

## 2023-04-03 DIAGNOSIS — M47.817 LUMBOSACRAL SPONDYLOSIS WITHOUT MYELOPATHY: ICD-10-CM

## 2023-04-03 DIAGNOSIS — Z79.891 CHRONIC USE OF OPIATE DRUG FOR THERAPEUTIC PURPOSE: ICD-10-CM

## 2023-04-03 PROCEDURE — 1123F ACP DISCUSS/DSCN MKR DOCD: CPT | Performed by: NURSE PRACTITIONER

## 2023-04-03 PROCEDURE — 99213 OFFICE O/P EST LOW 20 MIN: CPT | Performed by: NURSE PRACTITIONER

## 2023-04-03 PROCEDURE — 1036F TOBACCO NON-USER: CPT | Performed by: NURSE PRACTITIONER

## 2023-04-03 PROCEDURE — G8420 CALC BMI NORM PARAMETERS: HCPCS | Performed by: NURSE PRACTITIONER

## 2023-04-03 PROCEDURE — G8427 DOCREV CUR MEDS BY ELIG CLIN: HCPCS | Performed by: NURSE PRACTITIONER

## 2023-04-03 RX ORDER — HYDROCODONE BITARTRATE AND ACETAMINOPHEN 5; 325 MG/1; MG/1
1 TABLET ORAL EVERY 8 HOURS PRN
Qty: 30 TABLET | Refills: 0 | Status: CANCELLED | OUTPATIENT
Start: 2023-04-03 | End: 2023-04-13

## 2023-04-03 RX ORDER — GABAPENTIN 600 MG/1
600 TABLET ORAL 3 TIMES DAILY
Qty: 270 TABLET | Refills: 0 | Status: SHIPPED | OUTPATIENT
Start: 2023-04-03 | End: 2023-07-02

## 2023-04-03 ASSESSMENT — ENCOUNTER SYMPTOMS
SHORTNESS OF BREATH: 0
CONSTIPATION: 0
VOMITING: 0
COUGH: 0
BACK PAIN: 1
NAUSEA: 0
DIARRHEA: 0

## 2023-04-03 NOTE — PROGRESS NOTES
Known Allergies      Current Outpatient Medications:     gabapentin (NEURONTIN) 600 MG tablet, Take 1 tablet by mouth 3 times daily for 90 days. , Disp: 270 tablet, Rfl: 0    phenazopyridine (PYRIDIUM) 200 MG tablet, Take 1 tablet by mouth 3 times daily as needed for Pain, Disp: 15 tablet, Rfl: 0    Cyanocobalamin (VITAMIN B-12 CR PO), Take by mouth daily, Disp: , Rfl:     Magnesium 400 MG TABS, Take by mouth daily, Disp: , Rfl:     bumetanide (BUMEX) 2 MG tablet, Take 0.5 tablets by mouth daily, Disp: 30 tablet, Rfl: 3    tamsulosin (FLOMAX) 0.4 MG capsule, Take 1 capsule by mouth daily, Disp: 90 capsule, Rfl: 3    terazosin (HYTRIN) 10 MG capsule, Take 1 capsule by mouth nightly, Disp: , Rfl:     aspirin 81 MG EC tablet, Take 1 tablet by mouth daily, Disp: , Rfl:     metoprolol succinate (TOPROL XL) 25 MG extended release tablet, TAKE ONE TABLET BY MOUTH DAILY, Disp: 90 tablet, Rfl: 0    finasteride (PROSCAR) 5 MG tablet, Take 1 tablet by mouth daily, Disp: 30 tablet, Rfl: 0    Multiple Vitamin (M.V.I. ADULT IV), Infuse 1 tablet intravenously daily, Disp: , Rfl:     Ascorbic Acid (VITAMIN C) 250 MG tablet, Take 1 tablet by mouth daily, Disp: , Rfl:     Handicap Placard MISC, Handicap Placard valid for 5 years. Expires 2026, Disp: 1 each, Rfl: 0    Family History   Problem Relation Age of Onset    Heart Disease Mother     Diabetes Mother     Stroke Father        Social History     Socioeconomic History    Marital status:      Spouse name: Not on file    Number of children: Not on file    Years of education: Not on file    Highest education level: Not on file   Occupational History    Not on file   Tobacco Use    Smoking status: Former    Smokeless tobacco: Never    Tobacco comments:     quit 35 years ago    Vaping Use    Vaping Use: Never used   Substance and Sexual Activity    Alcohol use: Yes     Alcohol/week: 1.0 standard drink     Types: 1 Glasses of wine per week     Comment: social    Drug use:  No

## 2023-04-04 ENCOUNTER — HOSPITAL ENCOUNTER (OUTPATIENT)
Age: 88
Setting detail: SPECIMEN
Discharge: HOME | End: 2023-04-04

## 2023-04-04 DIAGNOSIS — R74.8 ELEVATED LIVER ENZYMES: ICD-10-CM

## 2023-04-04 LAB
ALP SERPL-CCNC: 75 U/L (ref 40–129)
ALT SERPL-CCNC: 18 U/L (ref 5–41)

## 2023-04-05 ENCOUNTER — HOSPITAL ENCOUNTER (EMERGENCY)
Facility: CLINIC | Age: 88
Discharge: HOME | End: 2023-04-05
Attending: EMERGENCY MEDICINE

## 2023-04-05 VITALS
BODY MASS INDEX: 21.77 KG/M2 | SYSTOLIC BLOOD PRESSURE: 165 MMHG | HEIGHT: 69 IN | TEMPERATURE: 98.2 F | WEIGHT: 147 LBS | DIASTOLIC BLOOD PRESSURE: 79 MMHG | RESPIRATION RATE: 16 BRPM | OXYGEN SATURATION: 96 % | HEART RATE: 99 BPM

## 2023-04-05 DIAGNOSIS — L02.31 ABSCESS OF BUTTOCK, RIGHT: Primary | ICD-10-CM

## 2023-04-05 ASSESSMENT — ENCOUNTER SYMPTOMS
ANAL BLEEDING: 0
BLOOD IN STOOL: 0
VOMITING: 0
ABDOMINAL PAIN: 0
NAUSEA: 0

## 2023-04-05 NOTE — DISCHARGE INSTRUCTIONS
Call the on-call general surgeon in the morning for follow-up. Continue antibiotics.   Return immediately for fever increasing pain feeling unwell or new concerns

## 2023-04-05 NOTE — ED PROVIDER NOTES
half dollar sized indurated area involving the right buttock near the anus but not appearing to include the sphincter. Small opening with purulent material leaking noted no overlying cellulitic streaking. Area is fluctuant. Neurological:      Mental Status: He is alert. MDM:   Patient has a buttock abscess that appears to not include the anal sphincter. I found it already leaking. I applied some pressure around it and appeared to drained it flat. I expressed to the patient and daughter that I felt that this should be properly handled by a general surgeon and I have given them a contact number. He will continue his doxycycline. They are to call in the morning for follow-up this week. We would like him to seek reevaluation in the ER setting including ours if they wish for further reevaluation for any worsening pain fever new concerns or symptoms. The patient was given the following medications:  No orders of the defined types were placed in this encounter. Labs Reviewed - No data to display     No orders to display        FINAL IMPRESSION      1. Abscess of buttock, right          DISPOSITION/PLAN   DISPOSITION Decision To Discharge 04/05/2023 02:58:46 AM      Condition on Disposition  Good    PATIENT REFERRED TO:  No follow-up provider specified.     DISCHARGE MEDICATIONS:  New Prescriptions    No medications on file       (Please note that portions of this note werecompleted with a voice recognition program.  Efforts were made to edit the dictations but occasionally words are mis-transcribed.)    Liza Auguste MD MD  Emergency Physician Attending         Liza Auguste MD  04/05/23 2129

## 2023-05-02 ENCOUNTER — TELEPHONE (OUTPATIENT)
Dept: PAIN MANAGEMENT | Age: 88
End: 2023-05-02

## 2023-05-12 ENCOUNTER — OFFICE VISIT (OUTPATIENT)
Dept: PAIN MANAGEMENT | Age: 88
End: 2023-05-12
Payer: COMMERCIAL

## 2023-05-12 VITALS — HEART RATE: 74 BPM | OXYGEN SATURATION: 96 % | WEIGHT: 148 LBS | HEIGHT: 69 IN | BODY MASS INDEX: 21.92 KG/M2

## 2023-05-12 DIAGNOSIS — M54.2 NECK PAIN, CHRONIC: ICD-10-CM

## 2023-05-12 DIAGNOSIS — G89.29 NECK PAIN, CHRONIC: ICD-10-CM

## 2023-05-12 DIAGNOSIS — M48.062 SPINAL STENOSIS OF LUMBAR REGION WITH NEUROGENIC CLAUDICATION: Primary | Chronic | ICD-10-CM

## 2023-05-12 DIAGNOSIS — M47.817 LUMBOSACRAL SPONDYLOSIS WITHOUT MYELOPATHY: Chronic | ICD-10-CM

## 2023-05-12 PROBLEM — Z79.891 CHRONIC USE OF OPIATE DRUG FOR THERAPEUTIC PURPOSE: Status: RESOLVED | Noted: 2021-06-16 | Resolved: 2023-05-12

## 2023-05-12 PROCEDURE — 1036F TOBACCO NON-USER: CPT | Performed by: NURSE PRACTITIONER

## 2023-05-12 PROCEDURE — G8427 DOCREV CUR MEDS BY ELIG CLIN: HCPCS | Performed by: NURSE PRACTITIONER

## 2023-05-12 PROCEDURE — 1123F ACP DISCUSS/DSCN MKR DOCD: CPT | Performed by: NURSE PRACTITIONER

## 2023-05-12 PROCEDURE — G8420 CALC BMI NORM PARAMETERS: HCPCS | Performed by: NURSE PRACTITIONER

## 2023-05-12 PROCEDURE — 99213 OFFICE O/P EST LOW 20 MIN: CPT | Performed by: NURSE PRACTITIONER

## 2023-05-12 RX ORDER — MELOXICAM 7.5 MG/1
7.5 TABLET ORAL DAILY
Qty: 90 TABLET | Refills: 1 | Status: SHIPPED | OUTPATIENT
Start: 2023-05-12

## 2023-05-12 ASSESSMENT — ENCOUNTER SYMPTOMS
RESPIRATORY NEGATIVE: 1
COUGH: 0
GASTROINTESTINAL NEGATIVE: 1
CONSTIPATION: 0
BACK PAIN: 1
SHORTNESS OF BREATH: 0

## 2023-05-12 NOTE — PROGRESS NOTES
Chief Complaint   Patient presents with    Back Pain     Wants to discuss medications    Neck Pain     PMH     80-year-old man here in office with his son in law  C/o with chronic back pain with radiation down both legs aggravated with standing and walking. Diagnosed with severe lumbar spinal stenosis  He has tried epidural injection, medication and physical therapy with minimal relief. Not interested in MILD  No loss of bladder or bowel control  Was  prescribed Norco BID prn along with gabapentin. Reports constipation as side effect from the Norco and also elevated LFT and not interested in continuing. Does admit to taking aleve to help with pain.  Will try long acting NSAID to limit risks    Patient is here today for: back pain to discuss meds  Pain level: 8/10  Character: pain  Radiating: yes bilateral legs  Weakness or numbness: both  Aggravating Factors: positions  Alleviating Factors: nothing  Constant or intermitting: constant  Bladder/bowel loss: no             Past Medical History:   Diagnosis Date    Anterolisthesis     degenerative- lumbar    Arrhythmia     Arthritis     spinal stenosis    BPH with obstruction/lower urinary tract symptoms     Caffeine use     3 coffee/day    Cardiac murmur     Cataracts, bilateral     Chronic back pain     Compression fracture of body of thoracic vertebra ([address] 12/2022    T12    Constipation     Enlarged prostate     Glaucoma     Hypertension     Prostatitis     TIA (transient ischemic attack)     Pt denies       Past Surgical History:   Procedure Laterality Date    CATARACT REMOVAL WITH IMPLANT Bilateral     COLONOSCOPY      EYE SURGERY      Strabismus    HERNIA REPAIR  2006    PAIN MANAGEMENT PROCEDURE Bilateral 11/12/2020    BILATERAL LUMBAR FACET STEROID INJECTION    L3-S1 performed by Benita Charles MD at [facility] Bilateral 11/23/2020    BILATERAL LUMBAR FACET STEROID INJECTION L3-S1 performed by Benita Charles MD at [address] [place] [address]

## 2023-06-26 ENCOUNTER — TELEPHONE (OUTPATIENT)
Dept: PAIN MANAGEMENT | Age: 88
End: 2023-06-26

## 2023-06-26 RX ORDER — MELOXICAM 15 MG/1
15 TABLET ORAL DAILY
Qty: 30 TABLET | Refills: 1 | Status: SHIPPED | OUTPATIENT
Start: 2023-06-26 | End: 2023-07-26

## 2023-07-17 ENCOUNTER — TELEPHONE (OUTPATIENT)
Age: 88
End: 2023-07-17

## 2023-07-17 NOTE — TELEPHONE ENCOUNTER
FYI - Patient called to say that after a fall, he thinks he might be having a problem urinating because he can't tell if he is going because the toilet doesn't turn yellow. Patient states he drinks water all the time. When asked if he has a urinal at home, he stated that he does and it was suggested that he use a urinal to tell if he is urinating or not. He is not having any bladder pain, no bleeding, no fever and no other signs of an infection. Patient will call us back to let us know if he is, in fact, passing urine.

## 2023-07-31 ENCOUNTER — TELEPHONE (OUTPATIENT)
Dept: PAIN MANAGEMENT | Age: 88
End: 2023-07-31

## 2023-07-31 RX ORDER — MELOXICAM 15 MG/1
15 TABLET ORAL DAILY
Qty: 90 TABLET | Refills: 0 | Status: SHIPPED | OUTPATIENT
Start: 2023-07-31 | End: 2023-10-29

## 2023-09-02 ENCOUNTER — HOSPITAL ENCOUNTER (EMERGENCY)
Facility: CLINIC | Age: 88
Discharge: HOME | End: 2023-09-02
Attending: EMERGENCY MEDICINE
Payer: COMMERCIAL

## 2023-09-02 VITALS
OXYGEN SATURATION: 96 % | RESPIRATION RATE: 16 BRPM | HEART RATE: 59 BPM | SYSTOLIC BLOOD PRESSURE: 122 MMHG | DIASTOLIC BLOOD PRESSURE: 72 MMHG | BODY MASS INDEX: 21.03 KG/M2 | HEIGHT: 69 IN | TEMPERATURE: 98.1 F | WEIGHT: 142 LBS

## 2023-09-02 DIAGNOSIS — S05.01XA ABRASION OF RIGHT CORNEA, INITIAL ENCOUNTER: Primary | ICD-10-CM

## 2023-09-02 DIAGNOSIS — B37.89 CANDIDA RASH OF GROIN: ICD-10-CM

## 2023-09-02 PROCEDURE — 6370000000 HC RX 637 (ALT 250 FOR IP): Performed by: PHYSICIAN ASSISTANT

## 2023-09-02 PROCEDURE — 99283 EMERGENCY DEPT VISIT LOW MDM: CPT

## 2023-09-02 RX ORDER — OFLOXACIN 3 MG/ML
2 SOLUTION/ DROPS OPHTHALMIC 4 TIMES DAILY
Qty: 1 EACH | Refills: 0 | Status: SHIPPED | OUTPATIENT
Start: 2023-09-02 | End: 2023-09-07

## 2023-09-02 RX ORDER — TETRACAINE HYDROCHLORIDE 5 MG/ML
1 SOLUTION OPHTHALMIC ONCE
Status: COMPLETED | OUTPATIENT
Start: 2023-09-02 | End: 2023-09-02

## 2023-09-02 RX ORDER — NYSTATIN 100000 U/G
CREAM TOPICAL
Qty: 30 G | Refills: 0 | Status: SHIPPED | OUTPATIENT
Start: 2023-09-02

## 2023-09-02 RX ADMIN — TETRACAINE HYDROCHLORIDE 1 DROP: 5 SOLUTION OPHTHALMIC at 13:57

## 2023-09-02 RX ADMIN — FLUORESCEIN SODIUM 1 MG: 1 STRIP OPHTHALMIC at 13:56

## 2023-09-02 ASSESSMENT — PAIN - FUNCTIONAL ASSESSMENT: PAIN_FUNCTIONAL_ASSESSMENT: NONE - DENIES PAIN

## 2023-09-02 NOTE — DISCHARGE INSTRUCTIONS
Take the medication as indicated and prescribed. Can use baby shampoo and warm washcloths to keep the eyes free of discharge and debris. Follow-up with your eye doctor as scheduled. For the yeast rash. Be sure to dry the area completely prior to putting on the topical antifungal cream.      PLEASE RETURN TO THE EMERGENCY DEPARTMENT IMMEDIATELY if your symptoms worsen in anyway or in 8-12 hours if not improved for re-evaluation. You should immediately return to the ER for symptoms such as loss of vision, facial weakness, dizziness, headaches, vomiting, or any other concerns. Please understand that at this time there is no evidence for a more serious underlying process, but that early in the process of an illness or injury, an emergency department workup can be falsely reassuring. You should contact your family doctor within the next 24 hours for a follow up appointment    [address]!!!    From [facility] ([facility]) and [facility]    On behalf of the Emergency Department staff at [facility]), I would like to thank you for giving us the opportunity to address your health care needs and concerns. We hope that during your visit, our service was delivered in a professional and caring manner. Please keep [facility] ([facility]) in mind as we walk with you down the path to your own personal wellness. Please expect an automated text message or email from us so we can ask a few questions about your health and progress. Based on your answers, a clinician may call you back to offer help and instructions. Please understand that early in the process of an illness or injury, an emergency department workup can be falsely reassuring. If you notice any worsening, changing or persistent symptoms please call your family doctor or return to the ER immediately. Tell us how we did during your visit at [email or web address]   and let us know about your experience

## 2023-09-02 NOTE — ED PROVIDER NOTES
[facility]  [address]  Phone: [phone]        Pt Name: Anthony Anderson  MRN: [id number]  [address] [facility] 4/17/1929  Date of evaluation: 9/2/23    CHIEFCOMPLAINT       Chief Complaint   Patient presents with    Blurred Vision     Pt here with blurry vision in left eye x2 days       HISTORY OF PRESENT ILLNESS (Location/Symptom, Timing/Onset, Context/Setting, Quality, Duration, Modifying Factors, Severity)      Anthony Anderson is a 80 y.o. male with no pertinent PMH who presents to the ED via private auto with RIGHT eye pain. Patient states that for the past 2 days he has been experiencing discharge, watery eye, and blurry vision to the right eye. Notes that it waxed and waned in severity last couple of days but has been persistent more so today. He does have some discomfort to the right eye. 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 Denies any known trauma or injury. He tried some warm compresses to help with the discharge. Patient also mentions that he has a rash in his groin that is very itchy. Denies any fever, chills, vision loss, double vision, headache, similar left eye symptoms, abdominal pain, urinary complaints, or any other concerns at this time. PAST MEDICAL / SURGICAL / SOCIAL / FAMILY HISTORY     PMH:  has a past medical history of Anterolisthesis, Arrhythmia, Arthritis, BPH with obstruction/lower urinary tract symptoms, Caffeine use, Cardiac murmur, Cataracts, bilateral, Chronic back pain, Compression fracture of body of thoracic vertebra ([address]), Constipation, Enlarged prostate, Glaucoma, Hypertension, Prostatitis, and TIA (transient ischemic attack). Surgical History:  has a past surgical history that includes eye surgery; hernia repair (2006); shoulder surgery (07/20/2011); Pain management procedure (Bilateral, 11/12/2020); Pain management procedure (Bilateral, 11/23/2020); Pain management procedure (Bilateral, 04/26/2021); Tonsillectomy and adenoidectomy;  Cataract the dictations but occasionally words aremis-transcribed.)      Silver Linares PA-C  09/02/23 1531

## 2023-09-02 NOTE — ED PROVIDER NOTES
[address] [facility] ED  eMERGENCY dEPARTMENT eNCOUnter   Independent Attestation     Pt Name: Queen Marla  MRN: [id number]  9[address] [facility] 4/17/1929  Date of evaluation: 9/2/23       Queen Marla is a 80 y.o. male who presents with Blurred Vision (Pt here with blurry vision in left eye x2 days)        Based on the medical record, the care appears appropriate. I was personally available for consultation in the Emergency Department.     Dallas Torres DO  Attending Emergency  Physician                Dallas Torres DO  09/02/23 9135

## 2023-09-12 ENCOUNTER — TELEPHONE (OUTPATIENT)
Dept: PAIN MANAGEMENT | Age: 88
End: 2023-09-12

## 2023-09-12 NOTE — TELEPHONE ENCOUNTER
Pt would like to know if he can start his norco back up what he is taking now is not work he can hardly even walk with his pain .  He still has norco at home please advise    Also pt would like to know if another injection would help him please advise

## 2023-09-15 ENCOUNTER — HOSPITAL ENCOUNTER (EMERGENCY)
Facility: CLINIC | Age: 88
Discharge: HOME | End: 2023-09-15
Attending: EMERGENCY MEDICINE
Payer: COMMERCIAL

## 2023-09-15 VITALS
DIASTOLIC BLOOD PRESSURE: 68 MMHG | SYSTOLIC BLOOD PRESSURE: 151 MMHG | HEART RATE: 74 BPM | OXYGEN SATURATION: 96 % | RESPIRATION RATE: 14 BRPM | TEMPERATURE: 97.9 F

## 2023-09-15 DIAGNOSIS — L02.214 ABSCESS OF RIGHT GROIN: Primary | ICD-10-CM

## 2023-09-15 DIAGNOSIS — B35.4 TINEA CORPORIS: ICD-10-CM

## 2023-09-15 LAB
ANION GAP SERPL CALCULATED.3IONS-SCNC: 8 MMOL/L (ref 9–17)
BASOPHILS # BLD: 0 K/UL (ref 0–0.2)
BASOPHILS NFR BLD: 1 % (ref 0–2)
BUN SERPL-MCNC: 12 MG/DL (ref 8–23)
CALCIUM SERPL-MCNC: 9.2 MG/DL (ref 8.6–10.4)
CHLORIDE SERPL-SCNC: 103 MMOL/L (ref 98–107)
CO2 SERPL-SCNC: 29 MMOL/L (ref 20–31)
CREAT SERPL-MCNC: 0.7 MG/DL (ref 0.7–1.2)
EOSINOPHIL # BLD: 0.1 K/UL (ref 0–0.4)
EOSINOPHILS RELATIVE PERCENT: 2 % (ref 1–4)
ERYTHROCYTE [DISTWIDTH] IN BLOOD BY AUTOMATED COUNT: 13.4 % (ref 12.5–15.4)
GFR SERPL CREATININE-BSD FRML MDRD: >60 ML/MIN/1.73M2
GLUCOSE SERPL-MCNC: 92 MG/DL (ref 70–99)
HCT VFR BLD AUTO: 37.6 % (ref 41–53)
HGB BLD-MCNC: 12.9 G/DL (ref 13.5–17.5)
LYMPHOCYTES NFR BLD: 1 K/UL (ref 1–4.8)
LYMPHOCYTES RELATIVE PERCENT: 15 % (ref 24–44)
MCH RBC QN AUTO: 31.9 PG (ref 26–34)
MCHC RBC AUTO-ENTMCNC: 34.3 G/DL (ref 31–37)
MCV RBC AUTO: 93 FL (ref 80–100)
MONOCYTES NFR BLD: 0.6 K/UL (ref 0.1–1.2)
MONOCYTES NFR BLD: 8 % (ref 2–11)
NEUTROPHILS NFR BLD: 74 % (ref 36–66)
NEUTS SEG NFR BLD: 5.3 K/UL (ref 1.8–7.7)
PLATELET # BLD AUTO: 232 K/UL (ref 140–450)
PMV BLD AUTO: 8.9 FL (ref 6–12)
POTASSIUM SERPL-SCNC: 4.3 MMOL/L (ref 3.7–5.3)
RBC # BLD AUTO: 4.04 M/UL (ref 4.5–5.9)
SODIUM SERPL-SCNC: 140 MMOL/L (ref 135–144)
WBC OTHER # BLD: 7.1 K/UL (ref 3.5–11)

## 2023-09-15 PROCEDURE — 36415 COLL VENOUS BLD VENIPUNCTURE: CPT

## 2023-09-15 PROCEDURE — 85025 COMPLETE CBC W/AUTO DIFF WBC: CPT

## 2023-09-15 PROCEDURE — 2580000003 HC RX 258: Performed by: EMERGENCY MEDICINE

## 2023-09-15 PROCEDURE — 99284 EMERGENCY DEPT VISIT MOD MDM: CPT

## 2023-09-15 PROCEDURE — 46050 I&D PERIANAL ABSCESS SUPFC: CPT

## 2023-09-15 PROCEDURE — 80048 BASIC METABOLIC PNL TOTAL CA: CPT

## 2023-09-15 PROCEDURE — 96365 THER/PROPH/DIAG IV INF INIT: CPT

## 2023-09-15 PROCEDURE — 96375 TX/PRO/DX INJ NEW DRUG ADDON: CPT

## 2023-09-15 PROCEDURE — 6360000002 HC RX W HCPCS: Performed by: EMERGENCY MEDICINE

## 2023-09-15 RX ORDER — FLUCONAZOLE 150 MG/1
150 TABLET ORAL
Qty: 1 TABLET | Refills: 0 | Status: SHIPPED | OUTPATIENT
Start: 2023-09-15 | End: 2023-09-20

## 2023-09-15 RX ORDER — KETOROLAC TROMETHAMINE 15 MG/ML
15 INJECTION, SOLUTION INTRAMUSCULAR; INTRAVENOUS ONCE
Status: DISCONTINUED | OUTPATIENT
Start: 2023-09-15 | End: 2023-09-15

## 2023-09-15 RX ORDER — CLOTRIMAZOLE 1 %
CREAM (GRAM) TOPICAL
Qty: 12 G | Refills: 1 | Status: SHIPPED | OUTPATIENT
Start: 2023-09-15 | End: 2023-09-22

## 2023-09-15 RX ORDER — CLINDAMYCIN HYDROCHLORIDE 300 MG/1
300 CAPSULE ORAL 3 TIMES DAILY
Qty: 21 CAPSULE | Refills: 0 | Status: SHIPPED | OUTPATIENT
Start: 2023-09-15 | End: 2023-09-22

## 2023-09-15 RX ORDER — FENTANYL CITRATE 0.05 MG/ML
50 INJECTION, SOLUTION INTRAMUSCULAR; INTRAVENOUS ONCE
Status: COMPLETED | OUTPATIENT
Start: 2023-09-15 | End: 2023-09-15

## 2023-09-15 RX ORDER — KETOROLAC TROMETHAMINE 30 MG/ML
30 INJECTION, SOLUTION INTRAMUSCULAR; INTRAVENOUS ONCE
Status: COMPLETED | OUTPATIENT
Start: 2023-09-15 | End: 2023-09-15

## 2023-09-15 RX ORDER — 0.9 % SODIUM CHLORIDE 0.9 %
500 INTRAVENOUS SOLUTION INTRAVENOUS ONCE
Status: COMPLETED | OUTPATIENT
Start: 2023-09-15 | End: 2023-09-15

## 2023-09-15 RX ADMIN — SODIUM CHLORIDE 500 ML: 9 INJECTION, SOLUTION INTRAVENOUS at 13:50

## 2023-09-15 RX ADMIN — VANCOMYCIN HYDROCHLORIDE 1000 MG: 1 INJECTION, POWDER, LYOPHILIZED, FOR SOLUTION INTRAVENOUS at 13:47

## 2023-09-15 RX ADMIN — KETOROLAC TROMETHAMINE 30 MG: 30 INJECTION, SOLUTION INTRAMUSCULAR at 13:58

## 2023-09-15 RX ADMIN — FENTANYL CITRATE 50 MCG: 50 INJECTION INTRAMUSCULAR; INTRAVENOUS at 13:51

## 2023-09-15 ASSESSMENT — PAIN SCALES - GENERAL: PAINLEVEL_OUTOF10: 4

## 2023-09-15 ASSESSMENT — PAIN - FUNCTIONAL ASSESSMENT: PAIN_FUNCTIONAL_ASSESSMENT: 0-10

## 2023-09-15 ASSESSMENT — ENCOUNTER SYMPTOMS: VOMITING: 0

## 2023-09-15 NOTE — ED PROVIDER NOTES
[address] [facility]  [address]  Phone: [phone]        [address] [facility]  EMERGENCY DEPARTMENT ENCOUNTER      Pt Name: Gwendloyn Olszewski  MRN: [id number]  9352 [facility] 4/17/1929  Date of evaluation: 9/15/2023  Provider: Kody Etienne DO    CHIEF COMPLAINT       Chief Complaint   Patient presents with    Wound Check     On-going rash/yeast infection that has worsened, Abscess-like wound near groin, drainage    Abscess     To right groin/inner thigh, drainage present          HISTORY OF PRESENT ILLNESS   (Location/Symptom, Timing/Onset,Context/Setting, Quality, Duration, Modifying Factors, Severity)  Note limiting factors. Gwendloyn Olszewski is a 80 y.o. male who presents to the emergency department for the evaluation of suspected abscess in the right groin. Patient was seen a couple of weeks ago and diagnosed with a yeast dermatitis and he states it really has not gotten much better. He is found to have an abscess in the right groin. He states he does have a history of recurring abscess, he normally gets them on his buttocks. He is not diabetic. He denies fever or chills. He states it has been draining into his diaper daily. Nursing Notes were reviewed. REVIEW OF SYSTEMS    (2-9systems for level 4, 10 or more for level 5)     Review of Systems   Constitutional:  Negative for fever. Gastrointestinal:  Negative for vomiting. Skin:  Positive for rash. Neurological:  Negative for weakness. Except asnoted above the remainder of the review of systems was reviewed and negative.        PAST MEDICAL HISTORY     Past Medical History:   Diagnosis Date    Anterolisthesis     degenerative- lumbar    Arrhythmia     Arthritis     spinal stenosis    BPH with obstruction/lower urinary tract symptoms     Caffeine use     3 coffee/day    Cardiac murmur     Cataracts, bilateral     Chronic back pain     Compression fracture of body of thoracic vertebra ([address]) 12/2022    T12

## 2023-09-15 NOTE — ED NOTES
Abd combine dressing applied to right groin, Bard skin cream applied to buttock and left groin, skin fold.       Taylor Cesar RN  09/15/23 3253

## 2023-09-18 ENCOUNTER — OFFICE VISIT (OUTPATIENT)
Dept: PAIN MANAGEMENT | Age: 88
End: 2023-09-18
Payer: COMMERCIAL

## 2023-09-18 VITALS — BODY MASS INDEX: 21.03 KG/M2 | HEART RATE: 64 BPM | HEIGHT: 69 IN | OXYGEN SATURATION: 98 % | WEIGHT: 142 LBS

## 2023-09-18 DIAGNOSIS — M54.59 INTRACTABLE LOW BACK PAIN: Primary | ICD-10-CM

## 2023-09-18 DIAGNOSIS — M48.062 SPINAL STENOSIS OF LUMBAR REGION WITH NEUROGENIC CLAUDICATION: Chronic | ICD-10-CM

## 2023-09-18 DIAGNOSIS — M47.817 LUMBOSACRAL SPONDYLOSIS WITHOUT MYELOPATHY: Chronic | ICD-10-CM

## 2023-09-18 PROCEDURE — 1036F TOBACCO NON-USER: CPT | Performed by: ANESTHESIOLOGY

## 2023-09-18 PROCEDURE — 99214 OFFICE O/P EST MOD 30 MIN: CPT | Performed by: ANESTHESIOLOGY

## 2023-09-18 PROCEDURE — G8420 CALC BMI NORM PARAMETERS: HCPCS | Performed by: ANESTHESIOLOGY

## 2023-09-18 PROCEDURE — G8427 DOCREV CUR MEDS BY ELIG CLIN: HCPCS | Performed by: ANESTHESIOLOGY

## 2023-09-18 PROCEDURE — 1123F ACP DISCUSS/DSCN MKR DOCD: CPT | Performed by: ANESTHESIOLOGY

## 2023-09-18 RX ORDER — OXYCODONE HYDROCHLORIDE 5 MG/1
5 TABLET ORAL DAILY PRN
Qty: 30 TABLET | Refills: 0 | Status: SHIPPED | OUTPATIENT
Start: 2023-09-18 | End: 2023-10-18

## 2023-09-18 ASSESSMENT — ENCOUNTER SYMPTOMS
DIARRHEA: 0
CONSTIPATION: 0
VOMITING: 0
CHEST TIGHTNESS: 0
BACK PAIN: 1
SHORTNESS OF BREATH: 0
WHEEZING: 0
NAUSEA: 0

## 2023-09-18 NOTE — PROGRESS NOTES
activity  He is diagnosed with severe lumbar degenerative disc disease and lumbar spinal stenosis  He is a high risk candidate for any major procedure  Patient was on medication management with Norco, unfortunately his liver enzymes were elevated and pain medications were discontinued to reduce Tylenol intake    This is resulted in severe increase in pain  He has been off medication for a while  Here today complaining of severe pain that interferes with daily activities requesting reinitiation of therapy with pain medication    I discussed trying opioids without Tylenol such as oxycodone  He will try taking the half tablet twice a day  If he finds it helpful we may consider further dose titration  Risk of sedation drowsiness respiratory depression and fall discussed with patient    d    1. Intractable low back pain    2. Lumbosacral spondylosis without myelopathy    3. Spinal stenosis of lumbar region with neurogenic claudication        No orders of the defined types were placed in this encounter. Orders Placed This Encounter   Medications    oxyCODONE (ROXICODONE) 5 MG immediate release tablet     Sig: Take 1 tablet by mouth daily as needed for Pain for up to 30 days. Max Daily Amount: 5 mg     Dispense:  30 tablet     Refill:  0     Reduce doses taken as pain becomes manageable      Controlled Substance Monitoring:    Acute and Chronic Pain Monitoring:   RX Monitoring Acute Pain Prescriptions Periodic Controlled Substance Monitoring   9/18/2023  12:42 PM Severe pain not adequately treated with lower dose. No signs of potential drug abuse or diversion identified. ;Possible medication side effects, risk of tolerance/dependence & alternative treatments discussed.                Electronically signed by Fanny Shields MD on 9/18/2023 at 1:49 PM

## 2023-09-25 ENCOUNTER — OFFICE VISIT (OUTPATIENT)
Age: 88
End: 2023-09-25
Payer: COMMERCIAL

## 2023-09-25 VITALS — WEIGHT: 142 LBS | BODY MASS INDEX: 21.03 KG/M2 | HEIGHT: 69 IN

## 2023-09-25 DIAGNOSIS — N13.8 BPH WITH OBSTRUCTION/LOWER URINARY TRACT SYMPTOMS: ICD-10-CM

## 2023-09-25 DIAGNOSIS — N40.1 BPH WITH OBSTRUCTION/LOWER URINARY TRACT SYMPTOMS: ICD-10-CM

## 2023-09-25 DIAGNOSIS — L72.3 SCROTAL SEBACEOUS CYST: Primary | ICD-10-CM

## 2023-09-25 DIAGNOSIS — R33.8 OTHER RETENTION OF URINE: ICD-10-CM

## 2023-09-25 LAB
BILIRUBIN, POC: NORMAL
BLOOD URINE, POC: NORMAL
CLARITY, POC: NORMAL
COLOR, POC: YELLOW
GLUCOSE URINE, POC: NORMAL
KETONES, POC: NORMAL
LEUKOCYTE EST, POC: NORMAL
NITRITE, POC: NORMAL
PH, POC: NORMAL
PROTEIN, POC: NORMAL
SPECIFIC GRAVITY, POC: NORMAL
UROBILINOGEN, POC: NORMAL

## 2023-09-25 PROCEDURE — G8427 DOCREV CUR MEDS BY ELIG CLIN: HCPCS | Performed by: SPECIALIST

## 2023-09-25 PROCEDURE — 81003 URINALYSIS AUTO W/O SCOPE: CPT | Performed by: SPECIALIST

## 2023-09-25 PROCEDURE — 1123F ACP DISCUSS/DSCN MKR DOCD: CPT | Performed by: SPECIALIST

## 2023-09-25 PROCEDURE — 1036F TOBACCO NON-USER: CPT | Performed by: SPECIALIST

## 2023-09-25 PROCEDURE — 99213 OFFICE O/P EST LOW 20 MIN: CPT | Performed by: SPECIALIST

## 2023-09-25 PROCEDURE — G8420 CALC BMI NORM PARAMETERS: HCPCS | Performed by: SPECIALIST

## 2023-09-25 NOTE — PROGRESS NOTES
[facility], [address] [place] [address] Urology Office Progress Note    Patient:  Queen Marla  YOB: 1929  Date: 9/25/2023    HISTORY OF PRESENT ILLNESS:   The patient is a 80 y.o. male  The patient presents with a \"scrotal mass\". Patient's PE shows a small 8 mm scrotal sebaceous cyst which does not show any evidence of infection and does not require any surgical Rx. Patient given reassurance. Patient stopped taking Flomax/tamsulosin 0.4 mg po qd for BPH symptoms due to dizziness.     Last AUA Symptom Score (QOL): 10 (2)    Summary of old records:   BPH: finasteride 5 mg qd; 8/18/21 PVR = 20 mL; stop terazosin 10 mg and use alfuzosin 10 mg po qd 9/22/21; tamsulosin 0.4 mg po qd (stopped due to dizziness); PUSvol=77.85 mL,cysto: moderate to large median lobe; 3/1/23 Greenlight laser TURP  8/31/21 VD: FFW=167 mL, IRR=530 mL/d, TV=7.67 voids/d, JUZ=190 mL/void, Nx2.67, NPi=56%, NUP=63 mL/hr   Gemtesa made it more difficult to void  Nocturia x 3  Recurrent UTI: 11/30/22, 12/20/22 E coli; 12/21/22 CT: bilateral renal cysts, BPH; PVR = 101 mL  Scrotal sebaceous cyst: no Rx required    Additional History: none    Procedures Today: N/A    Urinalysis today:  Results for POC orders placed in visit on 09/25/23   POCT Urinalysis No Micro (Auto)   Result Value Ref Range    Color, UA yellow     Clarity, UA cloudy     Glucose, UA POC neg     Bilirubin, UA      Ketones, UA      Spec Grav, UA      Blood, UA POC neg     pH, UA      Protein, UA POC trace     Urobilinogen, UA      Leukocytes, UA small     Nitrite, UA neg        Last several PSA's:  Lab Results   Component Value Date    PSA 0.60 07/23/2021       Last total testosterone:  No results found for: \"TESTOSTERONE\"    Last BUN and creatinine:  Lab Results   Component Value Date    BUN 12 09/15/2023     Lab Results   Component Value Date    CREATININE 0.7 09/15/2023       Last CBC:  Lab Results   Component Value Date    WBC 7.1 09/15/2023    HGB
on exam RLE and RUE strength appears slightly more diminished compared to LUE and LLE  pt denies hx of CVA  obtain CT head to r/o chronic CVA  PT consult

## 2023-10-18 ENCOUNTER — HOSPITAL ENCOUNTER (EMERGENCY)
Facility: CLINIC | Age: 88
Discharge: HOME | End: 2023-10-18
Attending: EMERGENCY MEDICINE
Payer: COMMERCIAL

## 2023-10-18 VITALS
RESPIRATION RATE: 12 BRPM | HEART RATE: 60 BPM | DIASTOLIC BLOOD PRESSURE: 62 MMHG | HEIGHT: 69 IN | BODY MASS INDEX: 20.59 KG/M2 | SYSTOLIC BLOOD PRESSURE: 106 MMHG | WEIGHT: 139 LBS | OXYGEN SATURATION: 96 % | TEMPERATURE: 97.4 F

## 2023-10-18 DIAGNOSIS — S05.01XA ABRASION OF RIGHT CORNEA, INITIAL ENCOUNTER: Primary | ICD-10-CM

## 2023-10-18 PROCEDURE — 99283 EMERGENCY DEPT VISIT LOW MDM: CPT

## 2023-10-18 PROCEDURE — 6370000000 HC RX 637 (ALT 250 FOR IP): Performed by: EMERGENCY MEDICINE

## 2023-10-18 RX ORDER — ERYTHROMYCIN 5 MG/G
OINTMENT OPHTHALMIC ONCE
Status: COMPLETED | OUTPATIENT
Start: 2023-10-18 | End: 2023-10-18

## 2023-10-18 RX ADMIN — ERYTHROMYCIN: 5 OINTMENT OPHTHALMIC at 15:42

## 2023-10-18 ASSESSMENT — VISUAL ACUITY
OD: 20/70
OS: 20/200
OU: 20/70

## 2023-10-18 ASSESSMENT — PAIN - FUNCTIONAL ASSESSMENT: PAIN_FUNCTIONAL_ASSESSMENT: 0-10

## 2023-10-18 ASSESSMENT — PAIN SCALES - GENERAL: PAINLEVEL_OUTOF10: 2

## 2023-10-18 ASSESSMENT — ENCOUNTER SYMPTOMS: EYE DISCHARGE: 1

## 2023-10-18 NOTE — ED PROVIDER NOTES
[facility]  [address]  Phone: [facility]  EMERGENCY DEPARTMENT ENCOUNTER      Pt Name: Claudia Luis  MRN: [id number]  9[address] [facility] [place] 4/17/1929  Date of evaluation: 10/18/2023  Provider: Em Nicole DO    CHIEF COMPLAINT       Chief Complaint   Patient presents with    Eye Problem     Right eye pain          HISTORY OF PRESENT ILLNESS   (Location/Symptom, Timing/Onset,Context/Setting, Quality, Duration, Modifying Factors, Severity)  Note limiting factors. Claudia Luis is a 80 y.o. male who presents to the emergency department for the evaluation of right eye discomfort. He was seen back at the beginning of September where he was diagnosed with a corneal abrasion and he said it got a little bit better but then he thinks that it got worse again. It is bothering him just a little bit and he has had some discharge and a little bit of redness near the tear duct. No injury. Some intermittent blurriness in vision    Nursing Notes were reviewed. REVIEW OF SYSTEMS    (2-9systems for level 4, 10 or more for level 5)     Review of Systems   Constitutional:  Negative for fever. Eyes:  Positive for discharge. Except asnoted above the remainder of the review of systems was reviewed and negative.        PAST MEDICAL HISTORY     Past Medical History:   Diagnosis Date    Anterolisthesis     degenerative- lumbar    Arrhythmia     Arthritis     spinal stenosis    BPH with obstruction/lower urinary tract symptoms     Caffeine use     3 coffee/day    Cardiac murmur     Cataracts, bilateral     Chronic back pain     Compression fracture of body of thoracic vertebra ([address]) 12/2022    T12    Constipation     Enlarged prostate     Glaucoma     Hypertension     Prostatitis     TIA (transient ischemic attack)     Pt denies         SURGICAL HISTORY       Past Surgical History:   Procedure Laterality Date    CATARACT REMOVAL WITH IMPLANT Bilateral

## 2023-11-06 ENCOUNTER — HOSPITAL ENCOUNTER (OUTPATIENT)
Age: 88
Setting detail: SPECIMEN
Discharge: HOME | End: 2023-11-06

## 2023-11-06 DIAGNOSIS — E55.9 VITAMIN D DEFICIENCY: ICD-10-CM

## 2023-11-06 LAB — 25(OH)D3 SERPL-MCNC: 60.3 NG/ML

## 2023-11-07 ENCOUNTER — OFFICE VISIT (OUTPATIENT)
Dept: PAIN MANAGEMENT | Age: 88
End: 2023-11-07
Payer: COMMERCIAL

## 2023-11-07 VITALS — WEIGHT: 139 LBS | HEART RATE: 64 BPM | HEIGHT: 69 IN | BODY MASS INDEX: 20.59 KG/M2 | OXYGEN SATURATION: 97 %

## 2023-11-07 DIAGNOSIS — M47.817 LUMBOSACRAL SPONDYLOSIS WITHOUT MYELOPATHY: Chronic | ICD-10-CM

## 2023-11-07 DIAGNOSIS — G89.29 NECK PAIN, CHRONIC: Primary | ICD-10-CM

## 2023-11-07 DIAGNOSIS — M54.2 NECK PAIN, CHRONIC: Primary | ICD-10-CM

## 2023-11-07 DIAGNOSIS — M48.062 SPINAL STENOSIS OF LUMBAR REGION WITH NEUROGENIC CLAUDICATION: Chronic | ICD-10-CM

## 2023-11-07 PROCEDURE — 1123F ACP DISCUSS/DSCN MKR DOCD: CPT | Performed by: ANESTHESIOLOGY

## 2023-11-07 PROCEDURE — G8420 CALC BMI NORM PARAMETERS: HCPCS | Performed by: ANESTHESIOLOGY

## 2023-11-07 PROCEDURE — 1036F TOBACCO NON-USER: CPT | Performed by: ANESTHESIOLOGY

## 2023-11-07 PROCEDURE — 99213 OFFICE O/P EST LOW 20 MIN: CPT | Performed by: ANESTHESIOLOGY

## 2023-11-07 PROCEDURE — G8427 DOCREV CUR MEDS BY ELIG CLIN: HCPCS | Performed by: ANESTHESIOLOGY

## 2023-11-07 PROCEDURE — G8484 FLU IMMUNIZE NO ADMIN: HCPCS | Performed by: ANESTHESIOLOGY

## 2023-11-07 RX ORDER — LIDOCAINE 50 MG/G
1 PATCH TOPICAL DAILY
Qty: 30 PATCH | Refills: 2 | Status: SHIPPED | OUTPATIENT
Start: 2023-11-07 | End: 2024-02-05

## 2023-11-07 RX ORDER — MELOXICAM 7.5 MG/1
TABLET ORAL
COMMUNITY
Start: 2023-11-03

## 2023-11-07 ASSESSMENT — ENCOUNTER SYMPTOMS
BACK PAIN: 1
CHEST TIGHTNESS: 0
SHORTNESS OF BREATH: 0
CONSTIPATION: 0
DIARRHEA: 0
NAUSEA: 0
VOMITING: 0
WHEEZING: 0

## 2023-11-07 NOTE — PROGRESS NOTES
COLONOSCOPY      EYE SURGERY      Strabismus    HERNIA REPAIR  2006    PAIN MANAGEMENT PROCEDURE Bilateral 11/12/2020    BILATERAL LUMBAR FACET STEROID INJECTION    L3-S1 performed by Lupe Blandon MD at [address] Bilateral 11/23/2020    BILATERAL LUMBAR FACET STEROID INJECTION L3-S1 performed by Lupe Blandon MD at [address] Bilateral 04/26/2021    RIGHT L3 AND LEFT L4 EPIDURAL STEROID INJECTION performed by Lupe Blandon MD at [facility]  07/20/2011    TONSILLECTOMY AND ADENOIDECTOMY      TURP  03/01/2023    cystoscopy    TURP N/A 3/1/2023    CYSTOSCOPY TRANSURETHRAL RESECTION PROSTATE GREENLIGHT LASER performed by Remedios Scherer MD at [facility] History     Socioeconomic History    Marital status:      Spouse name: None    Number of children: None    Years of education: None    Highest education level: None   Tobacco Use    Smoking status: Former    Smokeless tobacco: Never    Tobacco comments:     quit 35 years ago    Vaping Use    Vaping Use: Never used   Substance and Sexual Activity    Alcohol use: Yes     Alcohol/week: 1.0 standard drink of alcohol     Types: 1 Glasses of wine per week     Comment: social    Drug use: No     Social Determinants of Health     Financial Resource Strain: Low Risk  (9/18/2023)    Overall Financial Resource Strain (CARDIA)     Difficulty of Paying Living Expenses: Not hard at all   Food Insecurity: No Food Insecurity (9/18/2023)    Hunger Vital Sign     Worried About Running Out of Food in the Last Year: Never true     Ran Out of Food in the Last Year: Never true   Transportation Needs: Unknown (9/18/2023)    PRAPARE - Transportation     Lack of Transportation (Non-Medical):  No   Physical Activity: Sufficiently Active (8/17/2023)    Exercise Vital Sign     Days of Exercise per Week: 7 days     Minutes of Exercise per Session: 30 min   Housing Stability: Unknown (9/18/2023)

## 2023-11-13 ENCOUNTER — OFFICE VISIT (OUTPATIENT)
Age: 88
End: 2023-11-13
Payer: COMMERCIAL

## 2023-11-13 VITALS — HEIGHT: 69 IN | BODY MASS INDEX: 20.59 KG/M2 | WEIGHT: 139 LBS

## 2023-11-13 DIAGNOSIS — R35.1 NOCTURIA: ICD-10-CM

## 2023-11-13 DIAGNOSIS — N13.8 BPH WITH OBSTRUCTION/LOWER URINARY TRACT SYMPTOMS: Primary | ICD-10-CM

## 2023-11-13 DIAGNOSIS — R33.8 OTHER RETENTION OF URINE: ICD-10-CM

## 2023-11-13 DIAGNOSIS — N40.1 BPH WITH OBSTRUCTION/LOWER URINARY TRACT SYMPTOMS: Primary | ICD-10-CM

## 2023-11-13 PROCEDURE — G8420 CALC BMI NORM PARAMETERS: HCPCS | Performed by: SPECIALIST

## 2023-11-13 PROCEDURE — G8427 DOCREV CUR MEDS BY ELIG CLIN: HCPCS | Performed by: SPECIALIST

## 2023-11-13 PROCEDURE — 1123F ACP DISCUSS/DSCN MKR DOCD: CPT | Performed by: SPECIALIST

## 2023-11-13 PROCEDURE — G8484 FLU IMMUNIZE NO ADMIN: HCPCS | Performed by: SPECIALIST

## 2023-11-13 PROCEDURE — 1036F TOBACCO NON-USER: CPT | Performed by: SPECIALIST

## 2023-11-13 PROCEDURE — 99214 OFFICE O/P EST MOD 30 MIN: CPT | Performed by: SPECIALIST

## 2023-11-13 RX ORDER — ALFUZOSIN HYDROCHLORIDE 10 MG/1
10 TABLET, EXTENDED RELEASE ORAL DAILY
Qty: 30 TABLET | Refills: 11 | Status: SHIPPED | OUTPATIENT
Start: 2023-11-13

## 2023-11-13 NOTE — PROGRESS NOTES
[facility] [address] Urology Office Progress Note    Patient:  Kayla Grant  YOB: 1929  Date: 11/13/2023    HISTORY OF PRESENT ILLNESS:   The patient is a 80 y.o. male  Patient has some increase in obstructive voiding symptoms and weak stream which is bothersome. Will resume Alfuzosin 10 mg po qd for BPH symptoms. He is no longer taking Finasteride 5 mg po qd for BPH symptoms. Patient instructed to limit fluid intake 2-3 hours prior to bedtime to minimize nocturia or nocturnal incontinence. Last AUA Symptom Score (QOL): 10 (2)    Summary of old records:   BPH: finasteride 5 mg qd; 8/18/21 PVR = 20 mL; stop terazosin 10 mg and use alfuzosin 10 mg po qd 11/13/23; tamsulosin 0.4 mg po qd (stopped due to dizziness); PUSvol=77.85 mL,cysto: moderate to large median lobe; 3/1/23 Greenlight laser TURP  8/31/21 VD: HXP=657 mL, UGJ=544 mL/d, TV=7.67 voids/d, JJA=409 mL/void, Nx2.67, NPi=56%, NUP=63 mL/hr   Gemtesa made it more difficult to void  Nocturia x 3  Recurrent UTI: 11/30/22, 12/20/22 E coli; 12/21/22 CT: bilateral renal cysts, BPH; PVR = 101 mL  Scrotal sebaceous cyst: no Rx required    Additional History: none    Procedures Today: N/A    Urinalysis today:  No results found for this visit on 11/13/23.     Last several PSA's:  Lab Results   Component Value Date    PSA 0.60 07/23/2021       Last total testosterone:  No results found for: \"TESTOSTERONE\"    Last BUN and creatinine:  Lab Results   Component Value Date    BUN 12 09/15/2023     Lab Results   Component Value Date    CREATININE 0.7 09/15/2023       Last CBC:  Lab Results   Component Value Date    WBC 7.1 09/15/2023    HGB 12.9 (L) 09/15/2023    HCT 37.6 (L) 09/15/2023    MCV 93.0 09/15/2023     09/15/2023       Additional Lab/Culture results: none    Imaging Reviewed during this Office Visit: none  (results were independently reviewed by physician and radiology report verified)    Physical

## 2024-01-15 ENCOUNTER — TELEPHONE (OUTPATIENT)
Age: 89
End: 2024-01-15

## 2024-01-15 NOTE — TELEPHONE ENCOUNTER
PT'S SON CALLED TO CANCEL HIS APPT THIS AFTERNOON BECAUSE OF THE EXTREME COLD- THEY ARE WONDERING IF THEY COULD DO A PHONE VISIT AS THEY HAVE QUESTIONS ABOUT WHETHER THERE ARE ANY OTC MEDICATIONS THAT HE COULD TAKE TO HELP PREVENT UTI'S LIKE THEY'VE SEEN ADVERTISED ON TV, AND ALSO WHETHER HE SHOULD CONTINUE TAKING TAMSULOSIN WHICH WAS PRESCRIBED FOR HIM WHILE HE WAS IN THE HOSPITAL RECENTLY-PLEASE ADVISE-TIARA

## 2024-01-15 NOTE — TELEPHONE ENCOUNTER
Can postpone OV until better weather.  See if he is taking either Flomax/tamsulosin 0.4 mg po qd for BPH symptoms or Alfuzosin 10 mg po qd for BPH symptoms.  He should not be taking both.  Take the one he's not taking out of his med list.  Can use Cranberry pills po bid for UTI prevention.   [Initial Visit ___] : [unfilled] is here today for an initial visit  for [unfilled]

## 2024-01-22 ENCOUNTER — OFFICE VISIT (OUTPATIENT)
Age: 89
End: 2024-01-22
Payer: COMMERCIAL

## 2024-01-22 DIAGNOSIS — R35.1 NOCTURIA: ICD-10-CM

## 2024-01-22 DIAGNOSIS — N13.8 BPH WITH OBSTRUCTION/LOWER URINARY TRACT SYMPTOMS: ICD-10-CM

## 2024-01-22 DIAGNOSIS — R33.8 OTHER RETENTION OF URINE: Primary | ICD-10-CM

## 2024-01-22 DIAGNOSIS — N28.1 BILATERAL RENAL CYSTS: ICD-10-CM

## 2024-01-22 DIAGNOSIS — N40.1 BPH WITH OBSTRUCTION/LOWER URINARY TRACT SYMPTOMS: ICD-10-CM

## 2024-01-22 LAB
BILIRUBIN, POC: NORMAL
BLOOD URINE, POC: NORMAL
CLARITY, POC: CLEAR
COLOR, POC: YELLOW
GLUCOSE URINE, POC: NORMAL
KETONES, POC: NORMAL
LEUKOCYTE EST, POC: NORMAL
NITRITE, POC: NORMAL
PH, POC: NORMAL
PROTEIN, POC: NORMAL
SPECIFIC GRAVITY, POC: NORMAL
UROBILINOGEN, POC: NORMAL

## 2024-01-22 PROCEDURE — G8484 FLU IMMUNIZE NO ADMIN: HCPCS | Performed by: SPECIALIST

## 2024-01-22 PROCEDURE — 81003 URINALYSIS AUTO W/O SCOPE: CPT | Performed by: SPECIALIST

## 2024-01-22 PROCEDURE — G8427 DOCREV CUR MEDS BY ELIG CLIN: HCPCS | Performed by: SPECIALIST

## 2024-01-22 PROCEDURE — G8420 CALC BMI NORM PARAMETERS: HCPCS | Performed by: SPECIALIST

## 2024-01-22 PROCEDURE — 51798 US URINE CAPACITY MEASURE: CPT | Performed by: SPECIALIST

## 2024-01-22 PROCEDURE — 99214 OFFICE O/P EST MOD 30 MIN: CPT | Performed by: SPECIALIST

## 2024-01-22 PROCEDURE — 1036F TOBACCO NON-USER: CPT | Performed by: SPECIALIST

## 2024-01-22 PROCEDURE — 1123F ACP DISCUSS/DSCN MKR DOCD: CPT | Performed by: SPECIALIST

## 2024-01-22 RX ORDER — FINASTERIDE 5 MG/1
5 TABLET, FILM COATED ORAL DAILY
Qty: 90 TABLET | Refills: 3 | Status: SHIPPED | OUTPATIENT
Start: 2024-01-22 | End: 2024-01-22 | Stop reason: ALTCHOICE

## 2024-01-22 RX ORDER — POLYETHYLENE GLYCOL 3350 17 G/17G
POWDER, FOR SOLUTION ORAL
COMMUNITY

## 2024-01-22 RX ORDER — NYSTATIN 100000 [USP'U]/G
POWDER TOPICAL
COMMUNITY
Start: 2024-01-10

## 2024-01-22 RX ORDER — ALFUZOSIN HYDROCHLORIDE 10 MG/1
10 TABLET, EXTENDED RELEASE ORAL DAILY
Qty: 90 TABLET | Refills: 3 | Status: SHIPPED | OUTPATIENT
Start: 2024-01-22 | End: 2024-01-22 | Stop reason: ALTCHOICE

## 2024-01-22 NOTE — PROGRESS NOTES
Ken Colvin MD [facility] Urology Office Progress Note    Patient:  Victor Manuel Alexis  YOB: 1929  Date: 1/22/2024    HISTORY OF PRESENT ILLNESS:   The patient is a 89 y.o. male  Patient admitted in December to [facility] for a syncopal episode.   Will stop Flomax/tamsulosin 0.4 mg po qd for BPH symptoms since this was stopped in the past for dizziness.  Patient no longer taking Alfuzosin 10 mg po qd for BPH symptoms.   He is off Finasteride 5 mg po qd for BPH symptoms.   Patient has bilateral renal cysts on 12/11/23 CT-scan of abdomen and pelvis of no clinical significance.  His Postvoid Residual was 67 mL.  Patient instructed to limit fluid intake 2-3 hours prior to bedtime to minimize nocturia or nocturnal incontinence.   He is now taking Cranberry pills po bid for UTI prevention.   Lower urinary tract symptoms: urgency, frequency, hesitancy, decreased urinary stream, nocturia, 5 times per night, and straining to urinate.   Last AUA Symptom Score (QOL): 10 (2)  Today's AUA Symptom Score (QOL): 16 (0)    Summary of old records:   BPH: finasteride 5 mg qd; 8/18/21 PVR = 20 mL; stop terazosin 10 mg and use alfuzosin 10 mg po qd 11/13/23; tamsulosin 0.4 mg po qd (stopped due to dizziness); PUSvol=77.85 mL,cysto: moderate to large median lobe; 3/1/23 Greenlight laser TURP  8/31/21 VD: RQC=657 mL, DBE=774 mL/d, TV=7.67 voids/d, XTO=064 mL/void, Nx2.67, NPi=56%, NUP=63 mL/hr   Gemtesa made it more difficult to void  Nocturia x 3  Recurrent UTI: 11/30/22, 12/20/22 E coli; 12/21/22 CT: bilateral renal cysts, BPH; PVR = 101 mL  Scrotal sebaceous cyst: no Rx required  Bilateral renal cysts on 12/11/23 CT    Additional History: none    Procedures Today: Postvoid Residual:  Post-void Residual by bladder scanner: 67 mL      Urinalysis today:  Results for POC orders placed in visit on 01/22/24   POCT Urinalysis No Micro (Auto)   Result Value Ref Range    Color, UA yellow

## 2024-02-20 PROBLEM — M62.81 MUSCLE WEAKNESS (GENERALIZED): Status: ACTIVE | Noted: 2022-12-25

## 2024-03-10 DIAGNOSIS — M48.062 SPINAL STENOSIS OF LUMBAR REGION WITH NEUROGENIC CLAUDICATION: Chronic | ICD-10-CM

## 2024-03-10 DIAGNOSIS — M47.817 LUMBOSACRAL SPONDYLOSIS WITHOUT MYELOPATHY: ICD-10-CM

## 2024-03-11 RX ORDER — GABAPENTIN 600 MG/1
600 TABLET ORAL 3 TIMES DAILY
Qty: 90 TABLET | Refills: 2 | Status: SHIPPED | OUTPATIENT
Start: 2024-03-11 | End: 2024-04-10

## 2024-04-08 ENCOUNTER — TELEPHONE (OUTPATIENT)
Dept: PAIN MANAGEMENT | Age: 89
End: 2024-04-08

## 2024-04-08 NOTE — TELEPHONE ENCOUNTER
Pt is in need of refill [place]. Pt hasn't been here because of transportation. He is now having a hard time walking and his wife is also having health issues. He has no one to help them out and wants to know what he can do. Possibility a VV appt? Please advise?

## 2024-04-17 ENCOUNTER — HOSPITAL ENCOUNTER (OUTPATIENT)
Age: 89
Setting detail: SPECIMEN
Discharge: HOME | End: 2024-04-17

## 2024-04-17 DIAGNOSIS — R53.83 OTHER FATIGUE: ICD-10-CM

## 2024-04-17 LAB
ANION GAP SERPL CALCULATED.3IONS-SCNC: 11 MMOL/L (ref 9–16)
AST SERPL-CCNC: 37 U/L (ref 10–50)
BASOPHILS # BLD: 0.06 K/UL (ref 0–0.2)
BASOPHILS NFR BLD: 1 % (ref 0–2)
BUN SERPL-MCNC: 16 MG/DL (ref 8–23)
CALCIUM SERPL-MCNC: 9.3 MG/DL (ref 8.6–10.4)
CHLORIDE SERPL-SCNC: 101 MMOL/L (ref 98–107)
CO2 SERPL-SCNC: 26 MMOL/L (ref 20–31)
CREAT SERPL-MCNC: 0.8 MG/DL (ref 0.7–1.2)
EOSINOPHIL # BLD: 0.14 K/UL (ref 0–0.44)
EOSINOPHILS RELATIVE PERCENT: 3 % (ref 1–4)
ERYTHROCYTE [DISTWIDTH] IN BLOOD BY AUTOMATED COUNT: 14.4 % (ref 11.8–14.4)
GFR SERPL CREATININE-BSD FRML MDRD: 82 ML/MIN/1.73M2
GLUCOSE P FAST SERPL-MCNC: 91 MG/DL (ref 74–99)
HCT VFR BLD AUTO: 40.9 % (ref 40.7–50.3)
HGB BLD-MCNC: 13.7 G/DL (ref 13–17)
IMM GRANULOCYTES # BLD AUTO: <0.03 K/UL (ref 0–0.3)
IMM GRANULOCYTES NFR BLD: 0 %
LYMPHOCYTES NFR BLD: 1.13 K/UL (ref 1.1–3.7)
LYMPHOCYTES RELATIVE PERCENT: 23 % (ref 24–43)
MCH RBC QN AUTO: 31.6 PG (ref 25.2–33.5)
MCHC RBC AUTO-ENTMCNC: 33.5 G/DL (ref 28.4–34.8)
MCV RBC AUTO: 94.2 FL (ref 82.6–102.9)
MONOCYTES NFR BLD: 0.47 K/UL (ref 0.1–1.2)
MONOCYTES NFR BLD: 10 % (ref 3–12)
NEUTROPHILS NFR BLD: 63 % (ref 36–65)
NEUTS SEG NFR BLD: 3.03 K/UL (ref 1.5–8.1)
NRBC BLD-RTO: 0 PER 100 WBC
PLATELET # BLD AUTO: 218 K/UL (ref 138–453)
PMV BLD AUTO: 11.4 FL (ref 8.1–13.5)
POTASSIUM SERPL-SCNC: 4.7 MMOL/L (ref 3.7–5.3)
RBC # BLD AUTO: 4.34 M/UL (ref 4.21–5.77)
SODIUM SERPL-SCNC: 138 MMOL/L (ref 136–145)
WBC OTHER # BLD: 4.8 K/UL (ref 3.5–11.3)

## 2024-05-09 ENCOUNTER — HOSPITAL ENCOUNTER (OUTPATIENT)
Age: 89
Setting detail: SPECIMEN
Discharge: HOME | End: 2024-05-09

## 2024-05-09 DIAGNOSIS — R31.9 HEMATURIA, UNSPECIFIED TYPE: ICD-10-CM

## 2024-05-10 LAB
MICROORGANISM SPEC CULT: NORMAL
SPECIMEN DESCRIPTION: NORMAL

## 2024-05-15 PROBLEM — H40.9 GLAUCOMA: Status: ACTIVE | Noted: 2024-05-15

## 2024-05-15 PROBLEM — R77.8 OTHER SPECIFIED ABNORMALITIES OF PLASMA PROTEINS: Status: ACTIVE | Noted: 2023-12-20

## 2024-05-15 PROBLEM — I10 ESSENTIAL (PRIMARY) HYPERTENSION: Status: ACTIVE | Noted: 2022-12-25

## 2024-05-15 PROBLEM — R53.1 GENERALIZED WEAKNESS: Status: ACTIVE | Noted: 2022-12-21

## 2024-05-15 PROBLEM — I48.91 A-FIB (HCC): Status: ACTIVE | Noted: 2023-11-01

## 2024-05-15 PROBLEM — R55 SYNCOPE: Status: ACTIVE | Noted: 2023-07-02

## 2024-05-15 PROBLEM — I25.10 ATHEROSCLEROSIS OF CORONARY ARTERY: Status: ACTIVE | Noted: 2024-05-15

## 2024-05-15 PROBLEM — B35.1 ONYCHOMYCOSIS: Status: ACTIVE | Noted: 2024-03-12

## 2024-05-15 PROBLEM — L60.2 ONYCHOGRYPOSIS: Status: ACTIVE | Noted: 2024-03-12
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
## 2024-05-15 PROBLEM — K81.0 ACUTE CHOLECYSTITIS: Status: ACTIVE | Noted: 2022-12-25

## 2024-05-15 PROBLEM — H26.9 CATARACT: Status: ACTIVE | Noted: 2024-05-15

## 2024-05-15 PROBLEM — H04.123 DRY EYES: Status: ACTIVE | Noted: 2024-05-15

## 2024-05-15 PROBLEM — M79.674 PAIN IN TOES OF BOTH FEET: Status: ACTIVE | Noted: 2024-03-12

## 2024-05-15 PROBLEM — M79.675 PAIN IN TOES OF BOTH FEET: Status: ACTIVE | Noted: 2024-03-12

## 2024-05-15 PROBLEM — R54 AGE-RELATED PHYSICAL DEBILITY: Status: ACTIVE | Noted: 2024-05-15

## 2024-05-15 PROBLEM — R01.1 UNDIAGNOSED CARDIAC MURMURS: Status: ACTIVE | Noted: 2024-05-15

## 2024-05-15 PROBLEM — M51.16 RADICULOPATHY DUE TO LUMBAR INTERVERTEBRAL DISC DISORDER: Status: ACTIVE | Noted: 2020-06-08

## 2024-05-15 PROBLEM — H90.3 SENSORINEURAL HEARING LOSS, BILATERAL: Status: ACTIVE | Noted: 2024-05-15

## 2024-05-15 PROBLEM — U07.1 COVID-19: Status: ACTIVE | Noted: 2022-12-28

## 2024-05-15 PROBLEM — H54.40 BLINDNESS OF LEFT EYE: Status: ACTIVE | Noted: 2023-11-01

## 2024-05-15 PROBLEM — R79.89 ABNORMAL LFTS (LIVER FUNCTION TESTS): Status: ACTIVE | Noted: 2022-12-21

## 2024-05-15 PROBLEM — R42 DIZZINESS: Status: ACTIVE | Noted: 2023-07-02

## 2024-05-15 PROBLEM — I95.0 IDIOPATHIC HYPOTENSION: Status: ACTIVE | Noted: 2023-11-01

## 2024-05-15 PROBLEM — M19.90 ARTHRITIS: Status: ACTIVE | Noted: 2023-11-01

## 2024-05-15 PROBLEM — D64.9 ANEMIA: Status: ACTIVE | Noted: 2024-05-15

## 2024-05-15 PROBLEM — R74.8 ELEVATED LIVER ENZYMES: Status: ACTIVE | Noted: 2023-07-02

## 2024-05-15 PROBLEM — I49.9 CARDIAC ARRHYTHMIA: Status: ACTIVE | Noted: 2024-05-15

## 2024-05-15 PROBLEM — N30.01 ACUTE CYSTITIS WITH HEMATURIA: Status: ACTIVE | Noted: 2022-12-21

## 2024-05-15 PROBLEM — H52.4 PRESBYOPIA: Status: ACTIVE | Noted: 2024-05-15

## 2024-05-20 ENCOUNTER — HOSPITAL ENCOUNTER (EMERGENCY)
Facility: CLINIC | Age: 89
Discharge: HOME | End: 2024-05-20
Attending: EMERGENCY MEDICINE
Payer: COMMERCIAL

## 2024-05-20 VITALS
RESPIRATION RATE: 16 BRPM | DIASTOLIC BLOOD PRESSURE: 62 MMHG | BODY MASS INDEX: 22.22 KG/M2 | SYSTOLIC BLOOD PRESSURE: 144 MMHG | OXYGEN SATURATION: 96 % | TEMPERATURE: 97.9 F | HEIGHT: 69 IN | HEART RATE: 61 BPM | WEIGHT: 150 LBS

## 2024-05-20 DIAGNOSIS — H61.23 BILATERAL IMPACTED CERUMEN: Primary | ICD-10-CM

## 2024-05-20 PROCEDURE — 99282 EMERGENCY DEPT VISIT SF MDM: CPT

## 2024-05-20 ASSESSMENT — PAIN - FUNCTIONAL ASSESSMENT: PAIN_FUNCTIONAL_ASSESSMENT: NONE - DENIES PAIN

## 2024-05-20 NOTE — DISCHARGE INSTRUCTIONS
You may use over-the-counter Debrox to help keep earwax loose for easier drainage.  If the earwax impaction continues you may consider ENT follow-up for regular ear cleaning

## 2024-05-20 NOTE — ED PROVIDER NOTES
[facility]  eMERGENCY dEPARTMENT eNCOUnter   Independent Attestation     Pt Name: Victor Manuel Alexis  MRN: [id number]  Birthdate 4/17/1929  Date of evaluation: 5/20/24       Victor Manuel Alexis is a 89 y.o. male who presents with Cerumen Impaction        Based on the medical record, the care appears appropriate. I was personally available for consultation in the Emergency Department.    Ana Guerra DO  Attending Emergency  Physician               Ana Guerra DO  05/25/24 1292    
disposition diagnosis with the patient and or their family/guardian. I have answered their questions and given discharge instructions. They voiced understanding of these instructions and did not have any further questions or complaints.         CONSULTS:  None    PROCEDURES:  None    FINAL IMPRESSION      1. Bilateral impacted cerumen          DISPOSITION / PLAN     CONDITION ON DISPOSITION:   Good / Stable for discharge. home    PATIENT REFERRED TO:  Ishaan Gutierres MD  [address] [facility] [id number] [phone]    Call in 3 days  As needed, If symptoms worsen      DISCHARGE MEDICATIONS:  New Prescriptions    No medications on file       KHALIF Youssef CNP   Emergency Medicine Physician Assistant    (Please note that portions of this note were completed with a voice recognition program.  Efforts were made to edit the dictations but occasionally words aremis-transcribed.)       Linda Baker APRN - CNP  05/20/24 2304

## 2024-06-10 ENCOUNTER — HOSPITAL ENCOUNTER (EMERGENCY)
Facility: CLINIC | Age: 89
Discharge: HOME | End: 2024-06-10
Attending: EMERGENCY MEDICINE
Payer: COMMERCIAL

## 2024-06-10 VITALS
RESPIRATION RATE: 18 BRPM | OXYGEN SATURATION: 97 % | WEIGHT: 153 LBS | TEMPERATURE: 97.8 F | HEIGHT: 69 IN | SYSTOLIC BLOOD PRESSURE: 124 MMHG | HEART RATE: 54 BPM | DIASTOLIC BLOOD PRESSURE: 77 MMHG | BODY MASS INDEX: 22.66 KG/M2

## 2024-06-10 DIAGNOSIS — S05.01XA ABRASION OF RIGHT CORNEA, INITIAL ENCOUNTER: Primary | ICD-10-CM

## 2024-06-10 DIAGNOSIS — H04.129 DRY EYE: ICD-10-CM

## 2024-06-10 PROCEDURE — 99283 EMERGENCY DEPT VISIT LOW MDM: CPT

## 2024-06-10 PROCEDURE — 6370000000 HC RX 637 (ALT 250 FOR IP): Performed by: NURSE PRACTITIONER

## 2024-06-10 RX ORDER — TETRACAINE HYDROCHLORIDE 5 MG/ML
1 SOLUTION OPHTHALMIC ONCE
Status: COMPLETED | OUTPATIENT
Start: 2024-06-10 | End: 2024-06-10

## 2024-06-10 RX ORDER — ERYTHROMYCIN 5 MG/G
OINTMENT OPHTHALMIC
Qty: 3.5 G | Refills: 0 | Status: SHIPPED | OUTPATIENT
Start: 2024-06-10 | End: 2024-06-20

## 2024-06-10 RX ORDER — ERYTHROMYCIN 5 MG/G
OINTMENT OPHTHALMIC ONCE
Status: DISCONTINUED | OUTPATIENT
Start: 2024-06-10 | End: 2024-06-10 | Stop reason: HOSPADM

## 2024-06-10 RX ADMIN — FLUORESCEIN SODIUM 1 MG: 1 STRIP OPHTHALMIC at 11:40

## 2024-06-10 RX ADMIN — TETRACAINE HYDROCHLORIDE 1 DROP: 5 SOLUTION OPHTHALMIC at 11:40

## 2024-06-10 ASSESSMENT — PAIN - FUNCTIONAL ASSESSMENT: PAIN_FUNCTIONAL_ASSESSMENT: NONE - DENIES PAIN

## 2024-06-10 NOTE — ED PROVIDER NOTES
[facility]  eMERGENCY dEPARTMENT eNCOUnter   Independent Attestation     Pt Name: Victor Manuel Alexis  MRN: [id number]  Birthdate 4/17/1929  Date of evaluation: 6/10/24       Victor Manuel Alexis is a 89 y.o. male who presents with Eye Problem        Based on the medical record, the care appears appropriate. I was personally available for consultation in the Emergency Department.    Cheri Paez MD  Attending Emergency  Physician                Cheri Paez MD  06/10/24 0705    
Physician who either signs or Co-signs this chart in the absence of a cardiologist.        RADIOLOGY:   Non-plain film images such as CT, Ultrasound and MRI are read by the radiologist. Plain radiographic images are visualized and preliminarily interpreted by the emergency physician with the below findings:      Interpretation per the Radiologist below, if available at the time of this note:    No orders to display         ED BEDSIDE ULTRASOUND:   Performed by ED Physician - none    LABS:  Labs Reviewed - No data to display    All other labs were within normal range or not returned as of this dictation.    EMERGENCY DEPARTMENT COURSE and DIFFERENTIAL DIAGNOSIS/MDM:   Vitals:    Vitals:    06/10/24 1117   BP: 124/77   Pulse: 54   Resp: 18   Temp: 97.8 °F (36.6 °C)   TempSrc: Oral   SpO2: 97%   Weight: 69.4 kg (153 lb)   Height: 1.753 m (5' 9\")             REASSESSMENT     ED Course as of 06/10/24 1225   Mon Ras 10, 2024   1153 Son-in-law at bedside.  Eye exam was performed.  Small corneal abrasion noted at the 3 o'clock position.  After the procedure was performed patient states his blurred vision had improved.  We discussed using Systane lubricating eyedrops at night and first thing in the morning to prevent dry eyes.  We discussed follow-up with ophthalmology.  We also discussed antibiotic ointment usage for treatment of corneal abrasion.  [AR]      ED Course User Index  [AR] Nesha Duncan, APRN - CNP         PROCEDURES:  Unless otherwise noted below, none     Procedures      FINAL IMPRESSION      1. Abrasion of right cornea, initial encounter    2. Dry eye          DISPOSITION/PLAN   DISPOSITION Decision To Discharge 06/10/2024 11:55:09 AM      PATIENT REFERRED TO:  Luciano Booker MD  [address] [place] [id number]  [phone]    Schedule an appointment as soon as possible for a visit in 2 days        DISCHARGE MEDICATIONS:  New Prescriptions    ERYTHROMYCIN (ROMYCIN) 5 MG/GM OPHTHALMIC OINTMENT    Please

## 2024-06-10 NOTE — ED NOTES
Pt presents to ED c/o blurry vision in right eye. Pt states this has happened before and it is like he is looking through fog/haze. Pt states in the past he was given an eye drop that helped his symptoms. Pt denies any foreign body, pain, itching, or drainage. Pt arrives A/Ox4, PWD, PMS intact. Pt resting on stretcher with call light in reach.

## 2024-06-10 NOTE — DISCHARGE INSTRUCTIONS
Use antibiotic ointment as directed    Use Systane lubricating eyedrops once in the morning and once at night    Follow-up with ophthalmology in the next 2 days for reevaluation of symptoms call today to schedule an appointment    If any symptoms worsen, you develop headache, worsening vision, pain in the eye, difficulty thinking or speaking, any strokelike symptoms or any new or concerning symptoms arise please return immediately to the emergency department

## 2024-09-03 ENCOUNTER — OFFICE VISIT (OUTPATIENT)
Dept: PAIN MANAGEMENT | Age: 89
End: 2024-09-03
Payer: COMMERCIAL

## 2024-09-03 VITALS — HEIGHT: 69 IN | BODY MASS INDEX: 22.22 KG/M2 | WEIGHT: 150 LBS

## 2024-09-03 DIAGNOSIS — Z79.01 CHRONIC ANTICOAGULATION: ICD-10-CM

## 2024-09-03 DIAGNOSIS — M48.062 SPINAL STENOSIS OF LUMBAR REGION WITH NEUROGENIC CLAUDICATION: Chronic | ICD-10-CM

## 2024-09-03 DIAGNOSIS — M47.817 LUMBOSACRAL SPONDYLOSIS WITHOUT MYELOPATHY: Primary | Chronic | ICD-10-CM

## 2024-09-03 PROCEDURE — 99213 OFFICE O/P EST LOW 20 MIN: CPT | Performed by: ANESTHESIOLOGY

## 2024-09-03 PROCEDURE — 1123F ACP DISCUSS/DSCN MKR DOCD: CPT | Performed by: ANESTHESIOLOGY

## 2024-09-05 ENCOUNTER — HOSPITAL ENCOUNTER (OUTPATIENT)
Age: 89
Setting detail: SPECIMEN
Discharge: HOME | End: 2024-09-05

## 2024-09-05 ENCOUNTER — OFFICE VISIT (OUTPATIENT)
Age: 89
End: 2024-09-05
Payer: COMMERCIAL

## 2024-09-05 VITALS — HEIGHT: 69 IN | WEIGHT: 150 LBS | BODY MASS INDEX: 22.22 KG/M2

## 2024-09-05 DIAGNOSIS — N32.0 BLADDER NECK CONTRACTURE: ICD-10-CM

## 2024-09-05 DIAGNOSIS — R35.1 NOCTURIA: ICD-10-CM

## 2024-09-05 DIAGNOSIS — N39.0 RECURRENT UTI: ICD-10-CM

## 2024-09-05 DIAGNOSIS — N28.1 BILATERAL RENAL CYSTS: ICD-10-CM

## 2024-09-05 DIAGNOSIS — N39.0 RECURRENT UTI: Primary | ICD-10-CM

## 2024-09-05 DIAGNOSIS — R33.8 OTHER RETENTION OF URINE: Primary | ICD-10-CM

## 2024-09-05 DIAGNOSIS — N13.8 BPH WITH OBSTRUCTION/LOWER URINARY TRACT SYMPTOMS: ICD-10-CM

## 2024-09-05 DIAGNOSIS — N40.1 BPH WITH OBSTRUCTION/LOWER URINARY TRACT SYMPTOMS: ICD-10-CM

## 2024-09-05 LAB
BILIRUBIN, POC: NORMAL
BLOOD URINE, POC: NORMAL
CLARITY, POC: CLEAR
COLOR, POC: YELLOW
GLUCOSE URINE, POC: NORMAL MG/DL
KETONES, POC: NORMAL
LEUKOCYTE EST, POC: NORMAL
NITRITE, POC: NORMAL
PH, POC: NORMAL
PROTEIN, POC: NORMAL MG/DL
SPECIFIC GRAVITY, POC: NORMAL
UROBILINOGEN, POC: NORMAL

## 2024-09-05 PROCEDURE — 81003 URINALYSIS AUTO W/O SCOPE: CPT | Performed by: SPECIALIST

## 2024-09-05 PROCEDURE — 1123F ACP DISCUSS/DSCN MKR DOCD: CPT | Performed by: SPECIALIST

## 2024-09-05 PROCEDURE — 99214 OFFICE O/P EST MOD 30 MIN: CPT | Performed by: SPECIALIST

## 2024-09-05 PROCEDURE — 51798 US URINE CAPACITY MEASURE: CPT | Performed by: SPECIALIST

## 2024-09-05 NOTE — PROGRESS NOTES
Ken Colvin MD [facility] Urology Office Progress Note    Patient:  Victor Manuel Alexis  YOB: 1929  Date: 9/5/2024    HISTORY OF PRESENT ILLNESS:   The patient is a 89 y.o. male  The patient presents with with increasing obstructive voiding symptoms s/p 3/1/23 Greenlight laser TURP.  He presented to the ER recently with leg weakness and they noted 300 mL in his bladder.  Despite multiple attempts, they were unable to place an indwelling hernández catheter.  He spontaneously voided and they stopped trying.  However, the above history is consistent with a bladder neck contracture or urethral stricture and he will need a Cystoscopy and urethral dilation with sounds under MAC.  Today's Postvoid Residual was 66 mL.  Patient has nocturia multiple times a night.  Patient instructed to limit fluid intake 2-3 hours prior to bedtime to minimize nocturia or nocturnal incontinence.   Patient has stable bilateral renal cysts on 9/4/24 CT of no clinical significance.  Lower urinary tract symptoms: urgency, frequency, hesitancy, decreased urinary stream, and nocturia, 5 times per night   Last AUA Symptom Score (QOL): 16 (0)  Today's AUA Symptom Score (QOL): 24 (6)    Summary of old records:   BPH: finasteride 5 mg qd; 8/18/21 PVR = 20 mL; stop terazosin 10 mg and use alfuzosin 10 mg po qd 11/13/23; tamsulosin 0.4 mg po qd (stopped due to dizziness); PUSvol=77.85 mL,cysto: moderate to large median lobe; 3/1/23 Greenlight laser TURP  8/31/21 VD: IIW=941 mL, CDX=344 mL/d, TV=7.67 voids/d, IKI=668 mL/void, Nx2.67, NPi=56%, NUP=63 mL/hr   Gemtesa made it more difficult to void  Nocturia x 3  Recurrent UTI: 11/30/22, 12/20/22 E coli; 12/21/22 CT: bilateral renal cysts, BPH; PVR = 101 mL  Scrotal sebaceous cyst: no Rx required  Bilateral renal cysts on 12/11/23, 9/4/24 CT    Additional History: none    Procedures Today:   Postvoid Residual:  Post-void Residual by bladder scanner: 66 mL      Urinalysis

## 2024-09-06 LAB
MICROORGANISM SPEC CULT: NORMAL
SERVICE CMNT-IMP: NORMAL
SPECIMEN DESCRIPTION: NORMAL

## 2024-09-19 ENCOUNTER — HOSPITAL ENCOUNTER (OUTPATIENT)
Age: 89
Setting detail: SPECIMEN
Discharge: HOME | End: 2024-09-19

## 2024-09-19 DIAGNOSIS — M48.062 SPINAL STENOSIS OF LUMBAR REGION WITH NEUROGENIC CLAUDICATION: ICD-10-CM

## 2024-09-19 LAB — ALT SERPL-CCNC: 35 U/L (ref 10–50)

## 2024-10-10 ENCOUNTER — TELEPHONE (OUTPATIENT)
Age: 89
End: 2024-10-10

## 2024-10-10 NOTE — TELEPHONE ENCOUNTER
Patient's daughter, Jaci, called to let us know that patient has a procedure scheduled for next Friday, 10/18/24, but patient was just released to [facility] after having blood pressure issues. They inquired if they should do this procedure still, or if they should reschedule.

## 2024-10-14 ENCOUNTER — HOSPITAL ENCOUNTER (OUTPATIENT)
Age: 89
Setting detail: SPECIMEN
Discharge: HOME | End: 2024-10-14

## 2024-10-14 ENCOUNTER — TELEPHONE (OUTPATIENT)
Age: 89
End: 2024-10-14

## 2024-10-14 LAB
ALBUMIN SERPL-MCNC: 3.4 G/DL (ref 3.5–5.2)
ALP SERPL-CCNC: 80 U/L (ref 40–129)
ALT SERPL-CCNC: 34 U/L (ref 5–41)
ANION GAP SERPL CALCULATED.3IONS-SCNC: 11 MMOL/L (ref 9–17)
AST SERPL-CCNC: 19 U/L
BILIRUB SERPL-MCNC: 0.5 MG/DL (ref 0.3–1.2)
BUN SERPL-MCNC: 13 MG/DL (ref 8–23)
BUN/CREAT SERPL: 16 (ref 9–20)
CALCIUM SERPL-MCNC: 9.2 MG/DL (ref 8.6–10.4)
CHLORIDE SERPL-SCNC: 104 MMOL/L (ref 98–107)
CO2 SERPL-SCNC: 24 MMOL/L (ref 20–31)
CREAT SERPL-MCNC: 0.8 MG/DL (ref 0.7–1.2)
ERYTHROCYTE [DISTWIDTH] IN BLOOD BY AUTOMATED COUNT: 13.5 % (ref 11.8–14.4)
GFR, ESTIMATED: 81 ML/MIN/1.73M2
GLUCOSE SERPL-MCNC: 155 MG/DL (ref 70–99)
HCT VFR BLD AUTO: 39.2 % (ref 40.7–50.3)
HGB BLD-MCNC: 12.6 G/DL (ref 13–17)
MCH RBC QN AUTO: 32.4 PG (ref 25.2–33.5)
MCHC RBC AUTO-ENTMCNC: 32.1 G/DL (ref 28.4–34.8)
MCV RBC AUTO: 100.8 FL (ref 82.6–102.9)
NRBC BLD-RTO: 0 PER 100 WBC
PLATELET # BLD AUTO: 258 K/UL (ref 138–453)
PMV BLD AUTO: 10.7 FL (ref 8.1–13.5)
POTASSIUM SERPL-SCNC: 3.9 MMOL/L (ref 3.7–5.3)
PROT SERPL-MCNC: 6.2 G/DL (ref 6.4–8.3)
RBC # BLD AUTO: 3.89 M/UL (ref 4.21–5.77)
SODIUM SERPL-SCNC: 139 MMOL/L (ref 135–144)
WBC OTHER # BLD: 4.8 K/UL (ref 3.5–11.3)

## 2024-10-14 PROCEDURE — 80053 COMPREHEN METABOLIC PANEL: CPT

## 2024-10-14 PROCEDURE — 36415 COLL VENOUS BLD VENIPUNCTURE: CPT

## 2024-10-14 PROCEDURE — 85027 COMPLETE CBC AUTOMATED: CPT

## 2024-10-14 PROCEDURE — P9603 ONE-WAY ALLOW PRORATED MILES: HCPCS

## 2024-10-15 ENCOUNTER — TELEPHONE (OUTPATIENT)
Age: 89
End: 2024-10-15

## 2024-10-15 NOTE — TELEPHONE ENCOUNTER
Pt daughter Jaci called into office and left a message on the voicemail stating her dad is now in [place] and is having a hard time walking and other things that are going on at the moment. They do not feel like his surgery on 10/18/24 CYSTOSCOPY URETHRAL DILATATION WITH [place] OR [place] SOUNDS will be good for him with all of his other issues. They would like to cancel as of now

## 2024-11-14 ENCOUNTER — PREP FOR PROCEDURE (OUTPATIENT)
Age: 88
End: 2024-11-14

## 2024-12-09 ENCOUNTER — TELEPHONE (OUTPATIENT)
Age: 88
End: 2024-12-09

## 2024-12-09 NOTE — TELEPHONE ENCOUNTER
Patient called to say he is in [facility] due to broken devan after a fall.   He might be going to [place] for rehab after being discharge.  Patient is scheduled 12/20 at [place] for urethral dilation.  Will re-assess next week if able to have procedure

## 2024-12-16 ENCOUNTER — TELEPHONE (OUTPATIENT)
Age: 88
End: 2024-12-16

## 2024-12-16 NOTE — TELEPHONE ENCOUNTER
Rajendra'ts daughter called.  Patient is scheduled for cysto/urethral dilation on Friday at [place].  Patient has fallen and broke ankle.  She wonders how urgent this procedure is.  Can it wait until after first of year.  Please advise

## 2024-12-19 ENCOUNTER — HOSPITAL ENCOUNTER (OUTPATIENT)
Age: 88
Setting detail: SPECIMEN
Discharge: HOME | End: 2024-12-19

## 2024-12-19 LAB
ALBUMIN SERPL-MCNC: 3.1 G/DL (ref 3.5–5.2)
ALP SERPL-CCNC: 86 U/L (ref 40–129)
ALT SERPL-CCNC: 24 U/L (ref 10–50)
ANION GAP SERPL CALCULATED.3IONS-SCNC: 10 MMOL/L (ref 9–16)
AST SERPL-CCNC: 20 U/L (ref 10–50)
BILIRUB SERPL-MCNC: 0.5 MG/DL (ref 0–1.2)
BUN SERPL-MCNC: 12 MG/DL (ref 8–23)
CALCIUM SERPL-MCNC: 9 MG/DL (ref 8.2–9.6)
CHLORIDE SERPL-SCNC: 105 MMOL/L (ref 98–107)
CO2 SERPL-SCNC: 23 MMOL/L (ref 20–31)
CREAT SERPL-MCNC: 0.7 MG/DL (ref 0.7–1.2)
ERYTHROCYTE [DISTWIDTH] IN BLOOD BY AUTOMATED COUNT: 12.5 % (ref 11.8–14.4)
GFR, ESTIMATED: 85 ML/MIN/1.73M2
GLUCOSE SERPL-MCNC: 82 MG/DL (ref 75–121)
HCT VFR BLD AUTO: 35.6 % (ref 40.7–50.3)
HGB BLD-MCNC: 11.9 G/DL (ref 13–17)
MCH RBC QN AUTO: 32.5 PG (ref 25.2–33.5)
MCHC RBC AUTO-ENTMCNC: 33.4 G/DL (ref 28.4–34.8)
MCV RBC AUTO: 97.3 FL (ref 82.6–102.9)
NRBC BLD-RTO: 0 PER 100 WBC
PLATELET # BLD AUTO: 284 K/UL (ref 138–453)
PMV BLD AUTO: 10.9 FL (ref 8.1–13.5)
POTASSIUM SERPL-SCNC: 4.1 MMOL/L (ref 3.7–5.3)
PROT SERPL-MCNC: 5.8 G/DL (ref 6.6–8.7)
RBC # BLD AUTO: 3.66 M/UL (ref 4.21–5.77)
SODIUM SERPL-SCNC: 138 MMOL/L (ref 136–145)
WBC OTHER # BLD: 4.2 K/UL (ref 3.5–11.3)

## 2024-12-19 PROCEDURE — 80053 COMPREHEN METABOLIC PANEL: CPT

## 2024-12-19 PROCEDURE — P9603 ONE-WAY ALLOW PRORATED MILES: HCPCS

## 2024-12-19 PROCEDURE — 36415 COLL VENOUS BLD VENIPUNCTURE: CPT

## 2024-12-19 PROCEDURE — 85027 COMPLETE CBC AUTOMATED: CPT

## 2025-01-21 ENCOUNTER — HOSPITAL ENCOUNTER (OUTPATIENT)
Age: 89
Setting detail: SPECIMEN
Discharge: HOME | End: 2025-01-21

## 2025-01-21 LAB
ANION GAP SERPL CALCULATED.3IONS-SCNC: 9 MMOL/L (ref 9–16)
BUN SERPL-MCNC: 10 MG/DL (ref 8–23)
CALCIUM SERPL-MCNC: 8.9 MG/DL (ref 8.2–9.6)
CHLORIDE SERPL-SCNC: 104 MMOL/L (ref 98–107)
CO2 SERPL-SCNC: 25 MMOL/L (ref 20–31)
CREAT SERPL-MCNC: 0.8 MG/DL (ref 0.7–1.2)
ERYTHROCYTE [DISTWIDTH] IN BLOOD BY AUTOMATED COUNT: 12.9 % (ref 11.8–14.4)
GFR, ESTIMATED: 83 ML/MIN/1.73M2
GLUCOSE SERPL-MCNC: 135 MG/DL (ref 75–121)
HCT VFR BLD AUTO: 38.7 % (ref 40.7–50.3)
HGB BLD-MCNC: 12.7 G/DL (ref 13–17)
MCH RBC QN AUTO: 31.5 PG (ref 25.2–33.5)
MCHC RBC AUTO-ENTMCNC: 32.8 G/DL (ref 28.4–34.8)
MCV RBC AUTO: 96 FL (ref 82.6–102.9)
NRBC BLD-RTO: 0 PER 100 WBC
PLATELET # BLD AUTO: 214 K/UL (ref 138–453)
PMV BLD AUTO: 10.7 FL (ref 8.1–13.5)
POTASSIUM SERPL-SCNC: 4.1 MMOL/L (ref 3.7–5.3)
RBC # BLD AUTO: 4.03 M/UL (ref 4.21–5.77)
SODIUM SERPL-SCNC: 139 MMOL/L (ref 136–145)
WBC OTHER # BLD: 4.2 K/UL (ref 3.5–11.3)

## 2025-01-21 PROCEDURE — 36415 COLL VENOUS BLD VENIPUNCTURE: CPT

## 2025-01-21 PROCEDURE — 85027 COMPLETE CBC AUTOMATED: CPT

## 2025-01-21 PROCEDURE — 80048 BASIC METABOLIC PNL TOTAL CA: CPT

## 2025-01-21 PROCEDURE — P9603 ONE-WAY ALLOW PRORATED MILES: HCPCS

## 2025-02-21 ENCOUNTER — HOSPITAL ENCOUNTER (OUTPATIENT)
Age: 89
Setting detail: SPECIMEN
Discharge: HOME | End: 2025-02-21

## 2025-02-21 LAB
BACTERIA URNS QL MICRO: ABNORMAL
BILIRUB UR QL STRIP: ABNORMAL
CLARITY UR: ABNORMAL
COLOR UR: YELLOW
CRYSTALS URNS MICRO: ABNORMAL /HPF
EPI CELLS #/AREA URNS HPF: ABNORMAL /HPF (ref 0–5)
GLUCOSE UR STRIP-MCNC: NEGATIVE MG/DL
HGB UR QL STRIP.AUTO: ABNORMAL
KETONES UR STRIP-MCNC: ABNORMAL MG/DL
LEUKOCYTE ESTERASE UR QL STRIP: ABNORMAL
NITRITE UR QL STRIP: POSITIVE
PH UR STRIP: 6 [PH] (ref 5–8)
PROT UR STRIP-MCNC: ABNORMAL MG/DL
RBC #/AREA URNS HPF: ABNORMAL /HPF (ref 0–2)
SP GR UR STRIP: 1.02 (ref 1–1.03)
UROBILINOGEN UR STRIP-ACNC: ABNORMAL EU/DL (ref 0–1)
WBC #/AREA URNS HPF: ABNORMAL /HPF (ref 0–5)

## 2025-02-21 PROCEDURE — 87086 URINE CULTURE/COLONY COUNT: CPT

## 2025-02-21 PROCEDURE — 87186 SC STD MICRODIL/AGAR DIL: CPT

## 2025-02-21 PROCEDURE — 81001 URINALYSIS AUTO W/SCOPE: CPT

## 2025-02-21 PROCEDURE — 87077 CULTURE AEROBIC IDENTIFY: CPT

## 2025-02-23 LAB
MICROORGANISM SPEC CULT: ABNORMAL
SPECIMEN DESCRIPTION: ABNORMAL

## 2025-02-25 ENCOUNTER — HOSPITAL ENCOUNTER (OUTPATIENT)
Dept: GENERAL RADIOLOGY | Facility: CLINIC | Age: 89
Discharge: HOME | End: 2025-02-27
Payer: COMMERCIAL

## 2025-02-25 ENCOUNTER — HOSPITAL ENCOUNTER (OUTPATIENT)
Facility: CLINIC | Age: 89
Discharge: HOME | End: 2025-02-27
Payer: COMMERCIAL

## 2025-02-25 DIAGNOSIS — S82.851A CLOSED TRIMALLEOLAR FRACTURE OF RIGHT ANKLE, INITIAL ENCOUNTER: ICD-10-CM

## 2025-02-25 PROCEDURE — 73610 X-RAY EXAM OF ANKLE: CPT

## 2025-04-10 ENCOUNTER — HOSPITAL ENCOUNTER (OUTPATIENT)
Age: 89
Setting detail: SPECIMEN
Discharge: HOME | End: 2025-04-10

## 2025-04-10 LAB
25(OH)D3 SERPL-MCNC: 60.7 NG/ML (ref 30–100)
ALBUMIN SERPL-MCNC: 3.4 G/DL (ref 3.5–5.2)
ALBUMIN/GLOB SERPL: 1.3 {RATIO} (ref 1–2.5)
ALP SERPL-CCNC: 52 U/L (ref 40–129)
ALT SERPL-CCNC: 7 U/L (ref 10–50)
ANION GAP SERPL CALCULATED.3IONS-SCNC: 9 MMOL/L (ref 9–16)
AST SERPL-CCNC: 19 U/L (ref 10–50)
BILIRUB SERPL-MCNC: 0.5 MG/DL (ref 0–1.2)
BUN SERPL-MCNC: 9 MG/DL (ref 8–23)
CALCIUM SERPL-MCNC: 9.3 MG/DL (ref 8.2–9.6)
CHLORIDE SERPL-SCNC: 103 MMOL/L (ref 98–107)
CO2 SERPL-SCNC: 27 MMOL/L (ref 20–31)
CREAT SERPL-MCNC: 0.7 MG/DL (ref 0.7–1.2)
ERYTHROCYTE [DISTWIDTH] IN BLOOD BY AUTOMATED COUNT: 14 % (ref 11.8–14.4)
GFR, ESTIMATED: 83 ML/MIN/1.73M2
GLUCOSE SERPL-MCNC: 137 MG/DL (ref 75–121)
HCT VFR BLD AUTO: 40.7 % (ref 40.7–50.3)
HGB BLD-MCNC: 13.5 G/DL (ref 13–17)
MAGNESIUM SERPL-MCNC: 2.2 MG/DL (ref 1.7–2.3)
MCH RBC QN AUTO: 31.4 PG (ref 25.2–33.5)
MCHC RBC AUTO-ENTMCNC: 33.2 G/DL (ref 28.4–34.8)
MCV RBC AUTO: 94.7 FL (ref 82.6–102.9)
NRBC BLD-RTO: 0 PER 100 WBC
PLATELET # BLD AUTO: 190 K/UL (ref 138–453)
PMV BLD AUTO: 11.4 FL (ref 8.1–13.5)
POTASSIUM SERPL-SCNC: 3.8 MMOL/L (ref 3.7–5.3)
PROT SERPL-MCNC: 6.1 G/DL (ref 6.6–8.7)
RBC # BLD AUTO: 4.3 M/UL (ref 4.21–5.77)
SODIUM SERPL-SCNC: 139 MMOL/L (ref 136–145)
VIT B12 SERPL-MCNC: 1139 PG/ML (ref 232–1245)
WBC OTHER # BLD: 3.8 K/UL (ref 3.5–11.3)

## 2025-04-10 PROCEDURE — 85027 COMPLETE CBC AUTOMATED: CPT

## 2025-04-10 PROCEDURE — 36415 COLL VENOUS BLD VENIPUNCTURE: CPT

## 2025-04-10 PROCEDURE — 80053 COMPREHEN METABOLIC PANEL: CPT

## 2025-04-10 PROCEDURE — 83735 ASSAY OF MAGNESIUM: CPT

## 2025-04-10 PROCEDURE — 82306 VITAMIN D 25 HYDROXY: CPT

## 2025-04-10 PROCEDURE — 82607 VITAMIN B-12: CPT

## 2025-04-12 ENCOUNTER — HOSPITAL ENCOUNTER (OUTPATIENT)
Age: 89
Setting detail: SPECIMEN
Discharge: HOME | End: 2025-04-12

## 2025-04-12 LAB
ANION GAP SERPL CALCULATED.3IONS-SCNC: 8 MMOL/L (ref 9–16)
BUN SERPL-MCNC: 9 MG/DL (ref 8–23)
CALCIUM SERPL-MCNC: 9.1 MG/DL (ref 8.2–9.6)
CHLORIDE SERPL-SCNC: 103 MMOL/L (ref 98–107)
CO2 SERPL-SCNC: 28 MMOL/L (ref 20–31)
CREAT SERPL-MCNC: 0.8 MG/DL (ref 0.7–1.2)
ERYTHROCYTE [DISTWIDTH] IN BLOOD BY AUTOMATED COUNT: 13.9 % (ref 11.8–14.4)
GFR, ESTIMATED: 81 ML/MIN/1.73M2
GLUCOSE SERPL-MCNC: 144 MG/DL (ref 75–121)
HCT VFR BLD AUTO: 40.5 % (ref 40.7–50.3)
HGB BLD-MCNC: 13.3 G/DL (ref 13–17)
MCH RBC QN AUTO: 30.9 PG (ref 25.2–33.5)
MCHC RBC AUTO-ENTMCNC: 32.8 G/DL (ref 28.4–34.8)
MCV RBC AUTO: 94 FL (ref 82.6–102.9)
NRBC BLD-RTO: 0 PER 100 WBC
PLATELET # BLD AUTO: 200 K/UL (ref 138–453)
PMV BLD AUTO: 11.3 FL (ref 8.1–13.5)
POTASSIUM SERPL-SCNC: 3.9 MMOL/L (ref 3.7–5.3)
RBC # BLD AUTO: 4.31 M/UL (ref 4.21–5.77)
SODIUM SERPL-SCNC: 140 MMOL/L (ref 136–145)
WBC OTHER # BLD: 3.5 K/UL (ref 3.5–11.3)

## 2025-04-12 PROCEDURE — 85027 COMPLETE CBC AUTOMATED: CPT

## 2025-04-12 PROCEDURE — 80048 BASIC METABOLIC PNL TOTAL CA: CPT

## 2025-04-12 PROCEDURE — 36415 COLL VENOUS BLD VENIPUNCTURE: CPT

## 2025-04-20 ENCOUNTER — HOSPITAL ENCOUNTER (OUTPATIENT)
Age: 89
Setting detail: SPECIMEN
Discharge: HOME | End: 2025-04-20

## 2025-04-20 LAB
BILIRUB UR QL STRIP: NEGATIVE
CLARITY UR: CLEAR
COLOR UR: YELLOW
COMMENT: ABNORMAL
GLUCOSE UR STRIP-MCNC: NEGATIVE MG/DL
HGB UR QL STRIP.AUTO: NEGATIVE
KETONES UR STRIP-MCNC: ABNORMAL MG/DL
LEUKOCYTE ESTERASE UR QL STRIP: NEGATIVE
NITRITE UR QL STRIP: NEGATIVE
PH UR STRIP: 6 [PH] (ref 5–8)
PROT UR STRIP-MCNC: NEGATIVE MG/DL
SP GR UR STRIP: 1.01 (ref 1–1.03)
UROBILINOGEN UR STRIP-ACNC: NORMAL EU/DL (ref 0–1)

## 2025-04-20 PROCEDURE — 87086 URINE CULTURE/COLONY COUNT: CPT

## 2025-04-20 PROCEDURE — 81003 URINALYSIS AUTO W/O SCOPE: CPT

## 2025-04-21 LAB
MICROORGANISM SPEC CULT: NORMAL
SERVICE CMNT-IMP: NORMAL
SPECIMEN DESCRIPTION: NORMAL

## 2025-06-12 NOTE — H&P
Antoinette Vázquez MD [facility]    History and Physical    Patient:  Sandra Rosado  MRN: [id number]  YOB: 1929    CHIEF COMPLAINT:  lower urinary tract symptoms due to BPH    HISTORY OF PRESENT ILLNESS:   The patient is a 80 y.o. male who presents with: The patient presents with recurrent UTI's and lower urinary tract symptoms despite Flomax/tamsulosin 0.4 mg po qd and Finasteride 5 mg po qd for BPH symptoms. He has a large prostate with an obstructing median lobe which acts like a ball valve. He will need a Greenlight laser TURP or bipolar TURP to correct this and decrease his symptoms and risk of infection. Last AUA Symptom Score (QOL): 11 (2)     Summary of old records:   BPH: finasteride 5 mg qd; 8/18/21 PVR = 20 mL; stop terazosin 10 mg and use alfuzosin 10 mg po qd 9/22/21; tamsulosin 0.4 mg po qd; PUSvol=77.85 mL,cysto: moderate to large median lobe  8/31/21 VD: RFK=969 mL, WTL=684 mL/d, TV=7.67 voids/d, YBL=454 mL/void, Nx2.67, NPi=56%, NUP=63 mL/hr   Gemtesa made it more difficult to void  Nocturia x 3  Recurrent UTI: 11/30/22, 12/20/22 E coli; 12/21/22 CT: bilateral renal cysts, BPH; PVR = 101 mL     Patient's old records, notes and chart reviewed and summarized above.      Past Medical History:    Past Medical History:   Diagnosis Date    Anterolisthesis     degenerative- lumbar    Arrhythmia     Arthritis     spinal stenosis    BPH with obstruction/lower urinary tract symptoms     Caffeine use     3 coffee/day    Cardiac murmur     Cataracts, bilateral     Chronic back pain     Compression fracture of body of thoracic vertebra ([facility] [address] 12/2022    T12    Constipation     Enlarged prostate     Glaucoma     Hypertension     Prostatitis     TIA (transient ischemic attack)     Pt denies       Past Surgical History:    Past Surgical History:   Procedure Laterality Date    CATARACT REMOVAL WITH IMPLANT Bilateral     COLONOSCOPY      EYE SURGERY      Strabismus    HERNIA REPAIR  2006    PAIN MANAGEMENT PROCEDURE Bilateral 11/12/2020    BILATERAL LUMBAR FACET STEROID INJECTION    L3-S1 performed by Jaydon Schmidt MD at [facility] OR    PAIN MANAGEMENT PROCEDURE Bilateral 11/23/2020    BILATERAL LUMBAR FACET STEROID INJECTION L3-S1 performed by Jaydon Schmidt MD at [facility] OR    PAIN MANAGEMENT PROCEDURE Bilateral 04/26/2021    RIGHT L3 AND LEFT L4 EPIDURAL STEROID INJECTION performed by Jaydon Schmidt MD at [facility] OR    SHOULDER SURGERY  07/20/2011    TONSILLECTOMY AND ADENOIDECTOMY         Medications Prior to Admission:    Prior to Admission medications    Medication Sig Start Date End Date Taking? Authorizing Provider   Cyanocobalamin (VITAMIN B-12 CR PO) Take by mouth daily   Yes Historical Provider, MD   Magnesium 400 MG TABS Take by mouth daily   Yes Historical Provider, MD   gabapentin (NEURONTIN) 600 MG tablet Take 1 tablet by mouth 3 times daily for 30 days. 2/3/23 3/5/23  Linda Jessica APRN - CNP   bumetanide (BUMEX) 2 MG tablet Take 0.5 tablets by mouth daily 1/10/23   Ishaan Gutierres MD   tamsulosin (FLOMAX) 0.4 MG capsule Take 1 capsule by mouth daily 1/9/23   Ken Colvin MD   terazosin (HYTRIN) 10 MG capsule Take 10 mg by mouth nightly    Historical Provider, MD   aspirin 81 MG EC tablet Take 81 mg by mouth daily    Historical Provider, MD   metoprolol succinate (TOPROL XL) 25 MG extended release tablet TAKE ONE TABLET BY MOUTH DAILY 7/22/21   Ishaan Gutierres MD   finasteride (PROSCAR) 5 MG tablet Take 1 tablet by mouth daily 5/24/21   Ishaan Gutierres MD   Multiple Vitamin (M.V.I. ADULT IV) Infuse 1 tablet intravenously daily    Historical Provider, MD   Ascorbic Acid (VITAMIN C) 250 MG tablet Take 250 mg by mouth daily    Historical Provider, MD   Handicap Placard [facility] Handicap Placard valid for 5 years. Expires 2026 4/5/21   Ishaan Gutierres MD       Allergies:  Patient has no known allergies.    Social History:    Social History     Socioeconomic History    Marital status:      Spouse name: Not on file    Number of children: Not on file    Years of education: Not on file    Highest education level: Not on file   Occupational History    Not on file   Tobacco Use    Smoking status: Former    Smokeless tobacco: Never    Tobacco comments:     quit 35 years ago    Vaping Use    Vaping Use: Never used   Substance and Sexual Activity    Alcohol use: Yes     Alcohol/week: 1.0 standard drink     Types: 1 Glasses of wine per week     Comment: social    Drug use: No    Sexual activity: Not on file   Other Topics Concern    Not on file   Social History Narrative    Not on file     Social Determinants of Health     Financial Resource Strain: Not on file   Food Insecurity: Not on file   Transportation Needs: Not on file   Physical Activity: Not on file   Stress: Not on file   Social Connections: Not on file   Intimate Partner Violence: Not on file   Housing Stability: Not on file       Family History:    Family History   Problem Relation Age of Onset    Heart Disease Mother     Diabetes Mother     Stroke Father        REVIEW OF SYSTEMS:  All reviewed and negative except for pertinent ones listed in HPI. Physical Exam:      No data found. Constitutional: Patient in no acute distress; Neuro: alert and oriented to person place and time. Psych: Mood and affect normal.  Skin: Normal  Lungs: Respiratory effort normal, CTA  Cardiovascular:  Normal peripheral pulses; R3 wo murmur  Abdomen: Soft, non-tender, non-distended with no CVA, flank pain    LABS:   No results for input(s): WBC, HGB, HCT, MCV, PLT in the last 72 hours. No results for input(s): NA, K, CL, CO2, PHOS, BUN, CREATININE, CA in the last 72 hours.   Lab Results   Component Value Date    PSA 0.60 07/23/2021       Additional Lab/culture results:    Urinalysis: No results for input(s): COLORU, PHUR, LABCAST, WBCUA, RBCUA, MUCUS, TRICHOMONAS, YEAST, BACTERIA, CLARITYU, SPECGRAV, LEUKOCYTESUR, UROBILINOGEN, Gwenevere Serrano in the last 72 hours.    Invalid input(s): NITRATE, GLUCOSEUKETONESUAMORPHOUS     -----------------------------------------------------------------  Imaging Results:    Assessment and Plan   Impression:    Patient Active Problem List   Diagnosis    Edema    Low back pain    Asthmatic bronchitis without complication    Spinal stenosis of lumbar region with neurogenic claudication    Lumbosacral spondylosis without myelopathy    Abnormal MRI, lumbar spine    Chronic use of opiate drug for therapeutic purpose    Neck pain, chronic    BPH with obstruction/lower urinary tract symptoms    Nocturia    Frequency of micturition    Recurrent UTI    Other retention of urine       Plan: Patient here for a Greenlight laser TURP with possible bipolar TURP for his lower urinary tract symptoms and recurrent UTI's under [place].     Woodward Schwab, MD  7:36 AM 3/1/2023 Detail Level: Simple

## (undated) DEVICE — CATHETER URETH 20FR BLLN 30CC 3 W F SPEC INF CTRL BARDX

## (undated) DEVICE — APPLICATOR MEDICATED 10.5 CC SOLUTION HI LT ORNG CHLORAPREP

## (undated) DEVICE — Device

## (undated) DEVICE — TOWEL,OR,DSP,ST,BLUE,DLX,XR,4/PK,20PK/CS: Brand: MEDLINE

## (undated) DEVICE — SINGLE DOSE EPI TY

## (undated) DEVICE — SOLUTION IV 1000ML 0.9% SOD CHL PH 5 INJ USP VIAFLX PLAS

## (undated) DEVICE — RENTAL LASER XPS CASE

## (undated) DEVICE — SOLUTION SCRB 4OZ 4% CHG H2O AIDED FOR PREOPERATIVE SKIN

## (undated) DEVICE — DUP USE 330498 FIBER MAXY XPS

## (undated) DEVICE — SOLUTION IRRIG 1000ML STRL H2O USP PLAS POUR BTL

## (undated) DEVICE — METER,URINE,400ML,DRAIN BAG,L/F,LL: Brand: MEDLINE

## (undated) DEVICE — SOLUTION IRRIG 3000ML 0.9% SOD CHL USP UROMATIC PLAS CONT

## (undated) DEVICE — RENTAL CYSTOSCOPE CONT FLOW

## (undated) DEVICE — Y-TYPE TUR/BLADDER IRRIGATION SET, REGULATING CLAMP

## (undated) DEVICE — NEEDLE SPINAL 22GA L3.5IN SPINOCAN

## (undated) DEVICE — EXTENSION SET IV 12 IN 0.45 CC INFUSION MINIBOR TBNG CLMP

## (undated) DEVICE — GOWN,AURORA,NONRNF,XL,30/CS: Brand: MEDLINE

## (undated) DEVICE — MHPB CYSTO PACK-LF: Brand: MEDLINE INDUSTRIES, INC.

## (undated) DEVICE — GLOVE ORANGE PI 8   MSG9080

## (undated) DEVICE — 1010 S-DRAPE TOWEL DRAPE 10/BX: Brand: STERI-DRAPE™

## (undated) DEVICE — GLOVE SURG SZ 75 CRM LTX FREE POLYISOPRENE POLYMER BEAD ANTI

## (undated) DEVICE — NEEDLE SPNL L4.5IN OD22GA QNCKE TYP SPINOCAN